# Patient Record
Sex: FEMALE | Race: WHITE | NOT HISPANIC OR LATINO | URBAN - METROPOLITAN AREA
[De-identification: names, ages, dates, MRNs, and addresses within clinical notes are randomized per-mention and may not be internally consistent; named-entity substitution may affect disease eponyms.]

---

## 2017-05-22 RX ORDER — CHLORTHALIDONE 25 MG/1
TABLET ORAL
Qty: 30 TAB | Refills: 3 | Status: SHIPPED | OUTPATIENT
Start: 2017-05-22 | End: 2018-03-26

## 2017-05-22 NOTE — TELEPHONE ENCOUNTER
Was the patient seen in the last year in this department? Yes     Does patient have an active prescription for medications requested? No     Received Request Via: Pharmacy     Last visit:10/20/16

## 2017-06-19 RX ORDER — LOSARTAN POTASSIUM 25 MG/1
TABLET ORAL
Qty: 30 TAB | Refills: 3 | Status: SHIPPED | OUTPATIENT
Start: 2017-06-19 | End: 2018-03-26

## 2017-09-25 ENCOUNTER — APPOINTMENT (RX ONLY)
Dept: URBAN - METROPOLITAN AREA CLINIC 20 | Facility: CLINIC | Age: 52
Setting detail: DERMATOLOGY
End: 2017-09-25

## 2017-09-25 DIAGNOSIS — L57.8 OTHER SKIN CHANGES DUE TO CHRONIC EXPOSURE TO NONIONIZING RADIATION: ICD-10-CM

## 2017-09-25 DIAGNOSIS — L72.0 EPIDERMAL CYST: ICD-10-CM

## 2017-09-25 PROCEDURE — ? SUNSCREEN RECOMMENDATIONS

## 2017-09-25 PROCEDURE — ? COUNSELING

## 2017-09-25 PROCEDURE — ? INTRALESIONAL KENALOG

## 2017-09-25 PROCEDURE — ? MEDICATION COUNSELING

## 2017-09-25 PROCEDURE — ? PRESCRIPTION

## 2017-09-25 PROCEDURE — 99213 OFFICE O/P EST LOW 20 MIN: CPT | Mod: 25

## 2017-09-25 PROCEDURE — 11900 INJECT SKIN LESIONS </W 7: CPT

## 2017-09-25 RX ORDER — DOXYCYCLINE HYCLATE 100 MG/1
CAPSULE, GELATIN COATED ORAL
Qty: 20 | Refills: 0 | Status: ERX | COMMUNITY
Start: 2017-09-25

## 2017-09-25 RX ADMIN — DOXYCYCLINE HYCLATE: 100 CAPSULE, GELATIN COATED ORAL at 00:00

## 2017-09-25 ASSESSMENT — LOCATION SIMPLE DESCRIPTION DERM
LOCATION SIMPLE: LEFT CHEEK
LOCATION SIMPLE: INFERIOR FOREHEAD

## 2017-09-25 ASSESSMENT — LOCATION DETAILED DESCRIPTION DERM
LOCATION DETAILED: LEFT INFERIOR CENTRAL MALAR CHEEK
LOCATION DETAILED: INFERIOR MID FOREHEAD

## 2017-09-25 ASSESSMENT — LOCATION ZONE DERM: LOCATION ZONE: FACE

## 2017-09-25 NOTE — PROCEDURE: INTRALESIONAL KENALOG
Detail Level: Detailed
Size Of Lesion (Optional): 3
X Size Of Lesion In Cm (Optional): 0
Kenalog Preparation: Kenalog
Concentration Of Kenalog Solution Injected (Mg/Ml): 2.5
Total Volume (Ccs): .5
Lot # For Kenalog (Optional): [id number]
Expiration Date For Kenalog (Optional): AUG 2018
Include Z78.9 (Other Specified Conditions Influencing Health Status) As An Associated Diagnosis?: Yes
Administered By (Optional): Rosaura VASQUEZ
Medical Necessity Clause: This procedure was medically necessary because the lesions that were treated were:
Consent: The risks of atrophy, bleeding, scarring and infection were reviewed with the patient. Re-treatment may be necessary.

## 2017-09-25 NOTE — PROCEDURE: SUNSCREEN RECOMMENDATIONS
General Sunscreen Counseling: Advised sun protection-SPF 30 or higher, reapplication after 2 hours if in direct sunlight and wide brim hats.
Detail Level: Detailed

## 2017-09-27 ENCOUNTER — APPOINTMENT (RX ONLY)
Dept: URBAN - METROPOLITAN AREA CLINIC 20 | Facility: CLINIC | Age: 52
Setting detail: DERMATOLOGY
End: 2017-09-27

## 2017-09-27 DIAGNOSIS — L72.0 EPIDERMAL CYST: ICD-10-CM

## 2017-09-27 PROCEDURE — 10060 I&D ABSCESS SIMPLE/SINGLE: CPT

## 2017-09-27 PROCEDURE — ? INCISION AND DRAINAGE

## 2017-09-27 PROCEDURE — ? COUNSELING

## 2017-09-27 ASSESSMENT — LOCATION ZONE DERM
LOCATION ZONE: FACE
LOCATION ZONE: FACE

## 2017-09-27 ASSESSMENT — LOCATION DETAILED DESCRIPTION DERM
LOCATION DETAILED: LEFT INFERIOR CENTRAL MALAR CHEEK
LOCATION DETAILED: LEFT INFERIOR CENTRAL MALAR CHEEK

## 2017-09-27 ASSESSMENT — LOCATION SIMPLE DESCRIPTION DERM
LOCATION SIMPLE: LEFT CHEEK
LOCATION SIMPLE: LEFT CHEEK

## 2017-09-27 NOTE — PROCEDURE: INCISION AND DRAINAGE
Detail Level: Detailed
Consent was obtained and risks were reviewed including but not limited to delayed wound healing, infection, need for multiple I and D's, and pain.
Lesion Type: Abscess
Wound Care: Bacitracin
Curette Text (Optional): After the contents were expressed a curette was used to partially remove the cyst wall.
Drainage Type?: purulent  and cyst-like
Post-Care Instructions: I reviewed with the patient in detail post-care instructions. Patient should keep wound covered and call the office should any redness, pain, swelling or worsening occur.
Epidermal Closure: simple interrupted
Suture Text: The incision was partially closed with
Curette: No
Preparation Text: The area was prepped in the usual clean fashion.
Drainage Amount?: moderate
Method: 11 blade
Dressing: dry sterile dressing
Size Of Lesion In Cm (Optional But May Be Required For Some Insurances): 0
Epidermal Sutures: 4-0 Ethilon

## 2017-10-25 ENCOUNTER — APPOINTMENT (RX ONLY)
Dept: URBAN - METROPOLITAN AREA CLINIC 4 | Facility: CLINIC | Age: 52
Setting detail: DERMATOLOGY
End: 2017-10-25

## 2017-10-25 DIAGNOSIS — L72.8 OTHER FOLLICULAR CYSTS OF THE SKIN AND SUBCUTANEOUS TISSUE: ICD-10-CM

## 2017-10-25 PROBLEM — D48.5 NEOPLASM OF UNCERTAIN BEHAVIOR OF SKIN: Status: ACTIVE | Noted: 2017-10-25

## 2017-10-25 PROCEDURE — ? EXCISION

## 2017-10-25 PROCEDURE — 13132 CMPLX RPR F/C/C/M/N/AX/G/H/F: CPT

## 2017-10-25 PROCEDURE — 11444 EXC FACE-MM B9+MARG 3.1-4 CM: CPT

## 2017-10-25 ASSESSMENT — LOCATION ZONE DERM: LOCATION ZONE: FACE

## 2017-10-25 ASSESSMENT — LOCATION DETAILED DESCRIPTION DERM: LOCATION DETAILED: LEFT INFERIOR CENTRAL MALAR CHEEK

## 2017-10-25 ASSESSMENT — LOCATION SIMPLE DESCRIPTION DERM: LOCATION SIMPLE: LEFT CHEEK

## 2017-10-25 NOTE — PROCEDURE: EXCISION
Intermediate Repair Preamble Text (Leave Blank If You Do Not Want): Undermining was performed with blunt dissection.
Ear Star Wedge Flap Text: The defect edges were debeveled with a #15 blade scalpel.  Given the location of the defect and the proximity to free margins (helical rim) an ear star wedge flap was deemed most appropriate.  Using a sterile surgical marker, the appropriate flap was drawn incorporating the defect and placing the expected incisions between the helical rim and antihelix where possible.  The area thus outlined was incised through and through with a #15 scalpel blade.
Show Repair Anesthesia Variables: Yes
Estimated Blood Loss (Cc): minimal
Trilobed Flap Text: The defect edges were debeveled with a #15 scalpel blade.  Given the location of the defect and the proximity to free margins a trilobed flap was deemed most appropriate.  Using a sterile surgical marker, an appropriate trilobed flap drawn around the defect.    The area thus outlined was incised deep to adipose tissue with a #15 scalpel blade.  The skin margins were undermined to an appropriate distance in all directions utilizing iris scissors.
Medical Necessity Information: It is in your best interest to select a reason for this procedure from the list below. All of these items fulfill various CMS LCD requirements except lesion extends to a margin.
Bilobed Flap Text: The defect edges were debeveled with a #15 scalpel blade.  Given the location of the defect and the proximity to free margins a bilobe flap was deemed most appropriate.  Using a sterile surgical marker, an appropriate bilobe flap drawn around the defect.    The area thus outlined was incised deep to adipose tissue with a #15 scalpel blade.  The skin margins were undermined to an appropriate distance in all directions utilizing iris scissors.
Complex Repair And Dermal Autograft Text: The defect edges were debeveled with a #15 scalpel blade.  The primary defect was closed partially with a complex linear closure.  Given the location of the defect, shape of the defect and the proximity to free margins an dermal autograft was deemed most appropriate to repair the remaining defect.  The graft was trimmed to fit the size of the remaining defect.  The graft was then placed in the primary defect, oriented appropriately, and sutured into place.
Complex Repair And Bilobe Flap Text: The defect edges were debeveled with a #15 scalpel blade.  The primary defect was closed partially with a complex linear closure.  Given the location of the remaining defect, shape of the defect and the proximity to free margins a bilobe flap was deemed most appropriate for complete closure of the defect.  Using a sterile surgical marker, an appropriate advancement flap was drawn incorporating the defect and placing the expected incisions within the relaxed skin tension lines where possible.    The area thus outlined was incised deep to adipose tissue with a #15 scalpel blade.  The skin margins were undermined to an appropriate distance in all directions utilizing iris scissors.
Deep Sutures: 4-0 Vicryl
Complex Repair And Double M Plasty Text: The defect edges were debeveled with a #15 scalpel blade.  The primary defect was closed partially with a complex linear closure.  Given the location of the remaining defect, shape of the defect and the proximity to free margins a double M plasty was deemed most appropriate for complete closure of the defect.  Using a sterile surgical marker, an appropriate advancement flap was drawn incorporating the defect and placing the expected incisions within the relaxed skin tension lines where possible.    The area thus outlined was incised deep to adipose tissue with a #15 scalpel blade.  The skin margins were undermined to an appropriate distance in all directions utilizing iris scissors.
Lip Wedge Excision Repair Text: Given the location of the defect and the proximity to free margins a full thickness wedge repair was deemed most appropriate.  Using a sterile surgical marker, the appropriate repair was drawn incorporating the defect and placing the expected incisions perpendicular to the vermilion border.  The vermilion border was also meticulously outlined to ensure appropriate reapproximation during the repair.  The area thus outlined was incised through and through with a #15 scalpel blade.  The muscularis and dermis were reaproximated with deep sutures following hemostasis. Care was taken to realign the vermilion border before proceeding with the superficial closure.  Once the vermilion was realigned the superfical and mucosal closure was finished.
Bill For Surgical Tray: no
Post-Care Instructions: I reviewed with the patient in detail post-care instructions:\\n1. Apply bacitracin over the steri-strips.  \\n2. Cut non-stick pad (Telfa) to cover the steri-strips\\n3. Apply tape (hypafix) over the non-stick pad\\n4. Change once per day for 5 days\\n5. Shower with bandage on, change bandage after shower\\n\\nPatient is not to engage in any heavy lifting, exercise, hot tub, or swimming for the next 14 days. Should the patient develop any fevers, chills, bleeding, severe pain patient will contact the office immediately.
Surgeon Performing Repair (Optional): Mariam
Epidermal Closure: running subcuticular
Lab: [id number]
Purse String (Simple) Text: Given the location of the defect and the characteristics of the surrounding skin a purse string simple closure was deemed most appropriate.  Undermining was performed circumferentially around the surgical defect.  A purse string suture was then placed and tightened.
Primary Defect Width (In Cm): 0
Island Pedicle Flap With Canthal Suspension Text: The defect edges were debeveled with a #15 scalpel blade.  Given the location of the defect, shape of the defect and the proximity to free margins an island pedicle advancement flap was deemed most appropriate.  Using a sterile surgical marker, an appropriate advancement flap was drawn incorporating the defect, outlining the appropriate donor tissue and placing the expected incisions within the relaxed skin tension lines where possible. The area thus outlined was incised deep to adipose tissue with a #15 scalpel blade.  The skin margins were undermined to an appropriate distance in all directions around the primary defect and laterally outward around the island pedicle utilizing iris scissors.  There was minimal undermining beneath the pedicle flap. A suspension suture was placed in the canthal tendon to prevent tension and prevent ectropion.
Transposition Flap Text: The defect edges were debeveled with a #15 scalpel blade.  Given the location of the defect and the proximity to free margins a transposition flap was deemed most appropriate.  Using a sterile surgical marker, an appropriate transposition flap was drawn incorporating the defect.    The area thus outlined was incised deep to adipose tissue with a #15 scalpel blade.  The skin margins were undermined to an appropriate distance in all directions utilizing iris scissors.
Fusiform Excision Additional Text (Leave Blank If You Do Not Want): The margin was drawn around the clinically apparent lesion.  A fusiform shape was then drawn on the skin incorporating the lesion and margins.  Incisions were then made along these lines to the appropriate tissue plane and the lesion was extirpated.
Complex Repair And Tissue Cultured Epidermal Autograft Text: The defect edges were debeveled with a #15 scalpel blade.  The primary defect was closed partially with a complex linear closure.  Given the location of the defect, shape of the defect and the proximity to free margins an tissue cultured epidermal autograft was deemed most appropriate to repair the remaining defect.  The graft was trimmed to fit the size of the remaining defect.  The graft was then placed in the primary defect, oriented appropriately, and sutured into place.
Complex Repair And Epidermal Autograft Text: The defect edges were debeveled with a #15 scalpel blade.  The primary defect was closed partially with a complex linear closure.  Given the location of the defect, shape of the defect and the proximity to free margins an epidermal autograft was deemed most appropriate to repair the remaining defect.  The graft was trimmed to fit the size of the remaining defect.  The graft was then placed in the primary defect, oriented appropriately, and sutured into place.
Perilesional Excision Additional Text (Leave Blank If You Do Not Want): The margin was drawn around the clinically apparent lesion. Incisions were then made along these lines to the appropriate tissue plane and the lesion was extirpated.
Complex Repair And Single Advancement Flap Text: The defect edges were debeveled with a #15 scalpel blade.  The primary defect was closed partially with a complex linear closure.  Given the location of the remaining defect, shape of the defect and the proximity to free margins a single advancement flap was deemed most appropriate for complete closure of the defect.  Using a sterile surgical marker, an appropriate advancement flap was drawn incorporating the defect and placing the expected incisions within the relaxed skin tension lines where possible.    The area thus outlined was incised deep to adipose tissue with a #15 scalpel blade.  The skin margins were undermined to an appropriate distance in all directions utilizing iris scissors.
Detail Level: Detailed
Bilobed Transposition Flap Text: The defect edges were debeveled with a #15 scalpel blade.  Given the location of the defect and the proximity to free margins a bilobed transposition flap was deemed most appropriate.  Using a sterile surgical marker, an appropriate bilobe flap drawn around the defect.    The area thus outlined was incised deep to adipose tissue with a #15 scalpel blade.  The skin margins were undermined to an appropriate distance in all directions utilizing iris scissors.
Additional Anesthesia Volume In Cc: 6
Complex Repair And Transposition Flap Text: The defect edges were debeveled with a #15 scalpel blade.  The primary defect was closed partially with a complex linear closure.  Given the location of the remaining defect, shape of the defect and the proximity to free margins a transposition flap was deemed most appropriate for complete closure of the defect.  Using a sterile surgical marker, an appropriate advancement flap was drawn incorporating the defect and placing the expected incisions within the relaxed skin tension lines where possible.    The area thus outlined was incised deep to adipose tissue with a #15 scalpel blade.  The skin margins were undermined to an appropriate distance in all directions utilizing iris scissors.
Mucosal Advancement Flap Text: Given the location of the defect, shape of the defect and the proximity to free margins a mucosal advancement flap was deemed most appropriate. Incisions were made with a 15 blade scalpel in the appropriate fashion along the cutaneous vermillion border and the mucosal lip. The remaining actinically damaged mucosal tissue was excised.  The mucosal advancement flap was then elevated to the gingival sulcus with care taken to preserve the neurovascular structures and advanced into the primary defect. Care was taken to ensure that precise realignment of the vermilion border was achieved.
O-T Advancement Flap Text: The defect edges were debeveled with a #15 scalpel blade.  Given the location of the defect, shape of the defect and the proximity to free margins an O-T advancement flap was deemed most appropriate.  Using a sterile surgical marker, an appropriate advancement flap was drawn incorporating the defect and placing the expected incisions within the relaxed skin tension lines where possible.    The area thus outlined was incised deep to adipose tissue with a #15 scalpel blade.  The skin margins were undermined to an appropriate distance in all directions utilizing iris scissors.
Interpolation Flap Text: A decision was made to reconstruct the defect utilizing an interpolation axial flap and a staged reconstruction.  A telfa template was made of the defect.  This telfa template was then used to outline the interpolation flap.  The donor area for the pedicle flap was then injected with anesthesia.  The flap was excised through the skin and subcutaneous tissue down to the layer of the underlying musculature.  The interpolation flap was carefully excised within this deep plane to maintain its blood supply.  The edges of the donor site were undermined.   The donor site was closed in a primary fashion.  The pedicle was then rotated into position and sutured.  Once the tube was sutured into place, adequate blood supply was confirmed with blanching and refill.  The pedicle was then wrapped with xeroform gauze and dressed appropriately with a telfa and gauze bandage to ensure continued blood supply and protect the attached pedicle.
Epidermal Closure Graft Donor Site (Optional): simple interrupted
Home Suture Removal Text: Patient was provided a home suture removal kit and will remove their sutures at home.  If they have any questions or difficulties they will call the office.
Repair Type: Complex
Anesthesia Type: 1% lidocaine with 1:100,000 epinephrine and 408mcg clindamycin/ml and a 1:10 solution of 8.4% sodium bicarbonate
Cheek Interpolation Flap Text: A decision was made to reconstruct the defect utilizing an interpolation axial flap and a staged reconstruction.  A telfa template was made of the defect.  This telfa template was then used to outline the Cheek Interpolation flap.  The donor area for the pedicle flap was then injected with anesthesia.  The flap was excised through the skin and subcutaneous tissue down to the layer of the underlying musculature.  The interpolation flap was carefully excised within this deep plane to maintain its blood supply.  The edges of the donor site were undermined.   The donor site was closed in a primary fashion.  The pedicle was then rotated into position and sutured.  Once the tube was sutured into place, adequate blood supply was confirmed with blanching and refill.  The pedicle was then wrapped with xeroform gauze and dressed appropriately with a telfa and gauze bandage to ensure continued blood supply and protect the attached pedicle.
Excision Depth: adipose tissue
Complex Repair And Split-Thickness Skin Graft Text: The defect edges were debeveled with a #15 scalpel blade.  The primary defect was closed partially with a complex linear closure.  Given the location of the defect, shape of the defect and the proximity to free margins a split thickness skin graft was deemed most appropriate to repair the remaining defect.  The graft was trimmed to fit the size of the remaining defect.  The graft was then placed in the primary defect, oriented appropriately, and sutured into place.
Size Of Margin In Cm: 0.2
H Plasty Text: Given the location of the defect, shape of the defect and the proximity to free margins a H-plasty was deemed most appropriate for repair.  Using a sterile surgical marker, the appropriate advancement arms of the H-plasty were drawn incorporating the defect and placing the expected incisions within the relaxed skin tension lines where possible. The area thus outlined was incised deep to adipose tissue with a #15 scalpel blade. The skin margins were undermined to an appropriate distance in all directions utilizing iris scissors.  The opposing advancement arms were then advanced into place in opposite direction and anchored with interrupted buried subcutaneous sutures.
Excisional Biopsy Additional Text (Leave Blank If You Do Not Want): The margin was drawn around the clinically apparent lesion. An elliptical shape was then drawn on the skin incorporating the lesion and margins.  Incisions were then made along these lines to the appropriate tissue plane and the lesion was extirpated.
Composite Graft Text: The defect edges were debeveled with a #15 scalpel blade.  Given the location of the defect, shape of the defect, the proximity to free margins and the fact the defect was full thickness a composite graft was deemed most appropriate.  The defect was outline and then transferred to the donor site.  A full thickness graft was then excised from the donor site. The graft was then placed in the primary defect, oriented appropriately and then sutured into place.  The secondary defect was then repaired using a primary closure.
Star Wedge Flap Text: The defect edges were debeveled with a #15 scalpel blade.  Given the location of the defect, shape of the defect and the proximity to free margins a star wedge flap was deemed most appropriate.  Using a sterile surgical marker, an appropriate rotation flap was drawn incorporating the defect and placing the expected incisions within the relaxed skin tension lines where possible. The area thus outlined was incised deep to adipose tissue with a #15 scalpel blade.  The skin margins were undermined to an appropriate distance in all directions utilizing iris scissors.
Medical Necessity Clause: This procedure was medically necessary because the lesion that was treated was:
Scalpel Size: 15 blade
O-L Flap Text: The defect edges were debeveled with a #15 scalpel blade.  Given the location of the defect, shape of the defect and the proximity to free margins an O-L flap was deemed most appropriate.  Using a sterile surgical marker, an appropriate advancement flap was drawn incorporating the defect and placing the expected incisions within the relaxed skin tension lines where possible.    The area thus outlined was incised deep to adipose tissue with a #15 scalpel blade.  The skin margins were undermined to an appropriate distance in all directions utilizing iris scissors.
Complex Repair And O-L Flap Text: The defect edges were debeveled with a #15 scalpel blade.  The primary defect was closed partially with a complex linear closure.  Given the location of the remaining defect, shape of the defect and the proximity to free margins an O-L flap was deemed most appropriate for complete closure of the defect.  Using a sterile surgical marker, an appropriate flap was drawn incorporating the defect and placing the expected incisions within the relaxed skin tension lines where possible.    The area thus outlined was incised deep to adipose tissue with a #15 scalpel blade.  The skin margins were undermined to an appropriate distance in all directions utilizing iris scissors.
Complex Repair Preamble Text (Leave Blank If You Do Not Want): Extensive wide undermining was performed.
Billing Type: Third-Party Bill
O-T Plasty Text: The defect edges were debeveled with a #15 scalpel blade.  Given the location of the defect, shape of the defect and the proximity to free margins an O-T plasty was deemed most appropriate.  Using a sterile surgical marker, an appropriate O-T plasty was drawn incorporating the defect and placing the expected incisions within the relaxed skin tension lines where possible.    The area thus outlined was incised deep to adipose tissue with a #15 scalpel blade.  The skin margins were undermined to an appropriate distance in all directions utilizing iris scissors.
Complex Repair And W Plasty Text: The defect edges were debeveled with a #15 scalpel blade.  The primary defect was closed partially with a complex linear closure.  Given the location of the remaining defect, shape of the defect and the proximity to free margins a W plasty was deemed most appropriate for complete closure of the defect.  Using a sterile surgical marker, an appropriate advancement flap was drawn incorporating the defect and placing the expected incisions within the relaxed skin tension lines where possible.    The area thus outlined was incised deep to adipose tissue with a #15 scalpel blade.  The skin margins were undermined to an appropriate distance in all directions utilizing iris scissors.
Rhombic Flap Text: The defect edges were debeveled with a #15 scalpel blade.  Given the location of the defect and the proximity to free margins a rhombic flap was deemed most appropriate.  Using a sterile surgical marker, an appropriate rhombic flap was drawn incorporating the defect.    The area thus outlined was incised deep to adipose tissue with a #15 scalpel blade.  The skin margins were undermined to an appropriate distance in all directions utilizing iris scissors.
Epidermal Autograft Text: The defect edges were debeveled with a #15 scalpel blade.  Given the location of the defect, shape of the defect and the proximity to free margins an epidermal autograft was deemed most appropriate.  Using a sterile surgical marker, the primary defect shape was transferred to the donor site. The epidermal graft was then harvested.  The skin graft was then placed in the primary defect and oriented appropriately.
Complex Repair And Double Advancement Flap Text: The defect edges were debeveled with a #15 scalpel blade.  The primary defect was closed partially with a complex linear closure.  Given the location of the remaining defect, shape of the defect and the proximity to free margins a double advancement flap was deemed most appropriate for complete closure of the defect.  Using a sterile surgical marker, an appropriate advancement flap was drawn incorporating the defect and placing the expected incisions within the relaxed skin tension lines where possible.    The area thus outlined was incised deep to adipose tissue with a #15 scalpel blade.  The skin margins were undermined to an appropriate distance in all directions utilizing iris scissors.
Slit Excision Additional Text (Leave Blank If You Do Not Want): A linear line was drawn on the skin overlying the lesion. An incision was made slowly until the lesion was visualized.  Once visualized, the lesion was removed with blunt dissection.
Dorsal Nasal Flap Text: The defect edges were debeveled with a #15 scalpel blade.  Given the location of the defect and the proximity to free margins a dorsal nasal flap was deemed most appropriate.  Using a sterile surgical marker, an appropriate dorsal nasal flap was drawn around the defect.    The area thus outlined was incised deep to adipose tissue with a #15 scalpel blade.  The skin margins were undermined to an appropriate distance in all directions utilizing iris scissors.
Complex Repair And Xenograft Text: The defect edges were debeveled with a #15 scalpel blade.  The primary defect was closed partially with a complex linear closure.  Given the location of the defect, shape of the defect and the proximity to free margins a xenograft was deemed most appropriate to repair the remaining defect.  The graft was trimmed to fit the size of the remaining defect.  The graft was then placed in the primary defect, oriented appropriately, and sutured into place.
Cartilage Graft Text: The defect edges were debeveled with a #15 scalpel blade.  Given the location of the defect, shape of the defect, the fact the defect involved a full thickness cartilage defect a cartilage graft was deemed most appropriate.  An appropriate donor site was identified, cleansed, and anesthetized. The cartilage graft was then harvested and transferred to the recipient site, oriented appropriately and then sutured into place.  The secondary defect was then repaired using a primary closure.
Wound Care: Bacitracin
No Repair - Repaired With Adjacent Surgical Defect Text (Leave Blank If You Do Not Want): After the excision the defect was repaired concurrently with another surgical defect which was in close approximation.
Epidermal Sutures: 4-0 Ethilon
O-Z Plasty Text: The defect edges were debeveled with a #15 scalpel blade.  Given the location of the defect, shape of the defect and the proximity to free margins an O-Z plasty (double transposition flap) was deemed most appropriate.  Using a sterile surgical marker, the appropriate transposition flaps were drawn incorporating the defect and placing the expected incisions within the relaxed skin tension lines where possible.    The area thus outlined was incised deep to adipose tissue with a #15 scalpel blade.  The skin margins were undermined to an appropriate distance in all directions utilizing iris scissors.  Hemostasis was achieved with electrocautery.  The flaps were then transposed into place, one clockwise and the other counterclockwise, and anchored with interrupted buried subcutaneous sutures.
Consent was obtained from the patient. The risks and benefits to therapy were discussed in detail. Specifically, the risks of infection, scarring, bleeding, prolonged wound healing, incomplete removal, allergy to anesthesia, nerve injury and recurrence were addressed. Prior to the procedure, the treatment site was clearly identified and confirmed by the patient. All components of Universal Protocol/PAUSE Rule completed.
Keystone Flap Text: The defect edges were debeveled with a #15 scalpel blade.  Given the location of the defect, shape of the defect a keystone flap was deemed most appropriate.  Using a sterile surgical marker, an appropriate keystone flap was drawn incorporating the defect, outlining the appropriate donor tissue and placing the expected incisions within the relaxed skin tension lines where possible. The area thus outlined was incised deep to adipose tissue with a #15 scalpel blade.  The skin margins were undermined to an appropriate distance in all directions around the primary defect and laterally outward around the flap utilizing iris scissors.
Cheek-To-Nose Interpolation Flap Text: A decision was made to reconstruct the defect utilizing an interpolation axial flap and a staged reconstruction.  A telfa template was made of the defect.  This telfa template was then used to outline the Cheek-To-Nose Interpolation flap.  The donor area for the pedicle flap was then injected with anesthesia.  The flap was excised through the skin and subcutaneous tissue down to the layer of the underlying musculature.  The interpolation flap was carefully excised within this deep plane to maintain its blood supply.  The edges of the donor site were undermined.   The donor site was closed in a primary fashion.  The pedicle was then rotated into position and sutured.  Once the tube was sutured into place, adequate blood supply was confirmed with blanching and refill.  The pedicle was then wrapped with xeroform gauze and dressed appropriately with a telfa and gauze bandage to ensure continued blood supply and protect the attached pedicle.
Modified Advancement Flap Text: The defect edges were debeveled with a #15 scalpel blade.  Given the location of the defect, shape of the defect and the proximity to free margins a modified advancement flap was deemed most appropriate.  Using a sterile surgical marker, an appropriate advancement flap was drawn incorporating the defect and placing the expected incisions within the relaxed skin tension lines where possible.    The area thus outlined was incised deep to adipose tissue with a #15 scalpel blade.  The skin margins were undermined to an appropriate distance in all directions utilizing iris scissors.
Xenograft Text: The defect edges were debeveled with a #15 scalpel blade.  Given the location of the defect, shape of the defect and the proximity to free margins a xenograft was deemed most appropriate.  The graft was then trimmed to fit the size of the defect.  The graft was then placed in the primary defect and oriented appropriately.
V-Y Flap Text: The defect edges were debeveled with a #15 scalpel blade.  Given the location of the defect, shape of the defect and the proximity to free margins a V-Y flap was deemed most appropriate.  Using a sterile surgical marker, an appropriate advancement flap was drawn incorporating the defect and placing the expected incisions within the relaxed skin tension lines where possible.    The area thus outlined was incised deep to adipose tissue with a #15 scalpel blade.  The skin margins were undermined to an appropriate distance in all directions utilizing iris scissors.
Posterior Auricular Interpolation Flap Text: A decision was made to reconstruct the defect utilizing an interpolation axial flap and a staged reconstruction.  A telfa template was made of the defect.  This telfa template was then used to outline the posterior auricular interpolation flap.  The donor area for the pedicle flap was then injected with anesthesia.  The flap was excised through the skin and subcutaneous tissue down to the layer of the underlying musculature.  The pedicle flap was carefully excised within this deep plane to maintain its blood supply.  The edges of the donor site were undermined.   The donor site was closed in a primary fashion.  The pedicle was then rotated into position and sutured.  Once the tube was sutured into place, adequate blood supply was confirmed with blanching and refill.  The pedicle was then wrapped with xeroform gauze and dressed appropriately with a telfa and gauze bandage to ensure continued blood supply and protect the attached pedicle.
V-Y Plasty Text: The defect edges were debeveled with a #15 scalpel blade.  Given the location of the defect, shape of the defect and the proximity to free margins an V-Y advancement flap was deemed most appropriate.  Using a sterile surgical marker, an appropriate advancement flap was drawn incorporating the defect and placing the expected incisions within the relaxed skin tension lines where possible.    The area thus outlined was incised deep to adipose tissue with a #15 scalpel blade.  The skin margins were undermined to an appropriate distance in all directions utilizing iris scissors.
Complex Repair And Ftsg Text: The defect edges were debeveled with a #15 scalpel blade.  The primary defect was closed partially with a complex linear closure.  Given the location of the defect, shape of the defect and the proximity to free margins a full thickness skin graft was deemed most appropriate to repair the remaining defect.  The graft was trimmed to fit the size of the remaining defect.  The graft was then placed in the primary defect, oriented appropriately, and sutured into place.
Split-Thickness Skin Graft Text: The defect edges were debeveled with a #15 scalpel blade.  Given the location of the defect, shape of the defect and the proximity to free margins a split thickness skin graft was deemed most appropriate.  Using a sterile surgical marker, the primary defect shape was transferred to the donor site. The split thickness graft was then harvested.  The skin graft was then placed in the primary defect and oriented appropriately.
Complex Repair And Z Plasty Text: The defect edges were debeveled with a #15 scalpel blade.  The primary defect was closed partially with a complex linear closure.  Given the location of the remaining defect, shape of the defect and the proximity to free margins a Z plasty was deemed most appropriate for complete closure of the defect.  Using a sterile surgical marker, an appropriate advancement flap was drawn incorporating the defect and placing the expected incisions within the relaxed skin tension lines where possible.    The area thus outlined was incised deep to adipose tissue with a #15 scalpel blade.  The skin margins were undermined to an appropriate distance in all directions utilizing iris scissors.
W Plasty Text: The lesion was extirpated to the level of the fat with a #15 scalpel blade.  Given the location of the defect, shape of the defect and the proximity to free margins a W-plasty was deemed most appropriate for repair.  Using a sterile surgical marker, the appropriate transposition arms of the W-plasty were drawn incorporating the defect and placing the expected incisions within the relaxed skin tension lines where possible.    The area thus outlined was incised deep to adipose tissue with a #15 scalpel blade.  The skin margins were undermined to an appropriate distance in all directions utilizing iris scissors.  The opposing transposition arms were then transposed into place in opposite direction and anchored with interrupted buried subcutaneous sutures.
Graft Donor Site Bandage (Optional-Leave Blank If You Don't Want In Note): Steri-strips and a pressure bandage were applied to the donor site.
Island Pedicle Flap-Requiring Vessel Identification Text: The defect edges were debeveled with a #15 scalpel blade.  Given the location of the defect, shape of the defect and the proximity to free margins an island pedicle advancement flap was deemed most appropriate.  Using a sterile surgical marker, an appropriate advancement flap was drawn, based on the axial vessel mentioned above, incorporating the defect, outlining the appropriate donor tissue and placing the expected incisions within the relaxed skin tension lines where possible.    The area thus outlined was incised deep to adipose tissue with a #15 scalpel blade.  The skin margins were undermined to an appropriate distance in all directions around the primary defect and laterally outward around the island pedicle utilizing iris scissors.  There was minimal undermining beneath the pedicle flap.
Complex Repair And Melolabial Flap Text: The defect edges were debeveled with a #15 scalpel blade.  The primary defect was closed partially with a complex linear closure.  Given the location of the remaining defect, shape of the defect and the proximity to free margins a melolabial flap was deemed most appropriate for complete closure of the defect.  Using a sterile surgical marker, an appropriate advancement flap was drawn incorporating the defect and placing the expected incisions within the relaxed skin tension lines where possible.    The area thus outlined was incised deep to adipose tissue with a #15 scalpel blade.  The skin margins were undermined to an appropriate distance in all directions utilizing iris scissors.
Crescentic Advancement Flap Text: The defect edges were debeveled with a #15 scalpel blade.  Given the location of the defect and the proximity to free margins a crescentic advancement flap was deemed most appropriate.  Using a sterile surgical marker, the appropriate advancement flap was drawn incorporating the defect and placing the expected incisions within the relaxed skin tension lines where possible.    The area thus outlined was incised deep to adipose tissue with a #15 scalpel blade.  The skin margins were undermined to an appropriate distance in all directions utilizing iris scissors.
Complex Repair And A-T Advancement Flap Text: The defect edges were debeveled with a #15 scalpel blade.  The primary defect was closed partially with a complex linear closure.  Given the location of the remaining defect, shape of the defect and the proximity to free margins an A-T advancement flap was deemed most appropriate for complete closure of the defect.  Using a sterile surgical marker, an appropriate advancement flap was drawn incorporating the defect and placing the expected incisions within the relaxed skin tension lines where possible.    The area thus outlined was incised deep to adipose tissue with a #15 scalpel blade.  The skin margins were undermined to an appropriate distance in all directions utilizing iris scissors.
Complex Repair And O-T Advancement Flap Text: The defect edges were debeveled with a #15 scalpel blade.  The primary defect was closed partially with a complex linear closure.  Given the location of the remaining defect, shape of the defect and the proximity to free margins an O-T advancement flap was deemed most appropriate for complete closure of the defect.  Using a sterile surgical marker, an appropriate advancement flap was drawn incorporating the defect and placing the expected incisions within the relaxed skin tension lines where possible.    The area thus outlined was incised deep to adipose tissue with a #15 scalpel blade.  The skin margins were undermined to an appropriate distance in all directions utilizing iris scissors.
Dermal Autograft Text: The defect edges were debeveled with a #15 scalpel blade.  Given the location of the defect, shape of the defect and the proximity to free margins a dermal autograft was deemed most appropriate.  Using a sterile surgical marker, the primary defect shape was transferred to the donor site. The area thus outlined was incised deep to adipose tissue with a #15 scalpel blade.  The harvested graft was then trimmed of adipose and epidermal tissue until only dermis was left.  The skin graft was then placed in the primary defect and oriented appropriately.
Complex Repair And V-Y Plasty Text: The defect edges were debeveled with a #15 scalpel blade.  The primary defect was closed partially with a complex linear closure.  Given the location of the remaining defect, shape of the defect and the proximity to free margins a V-Y plasty was deemed most appropriate for complete closure of the defect.  Using a sterile surgical marker, an appropriate advancement flap was drawn incorporating the defect and placing the expected incisions within the relaxed skin tension lines where possible.    The area thus outlined was incised deep to adipose tissue with a #15 scalpel blade.  The skin margins were undermined to an appropriate distance in all directions utilizing iris scissors.
Island Pedicle Flap Text: The defect edges were debeveled with a #15 scalpel blade.  Given the location of the defect, shape of the defect and the proximity to free margins an island pedicle advancement flap was deemed most appropriate.  Using a sterile surgical marker, an appropriate advancement flap was drawn incorporating the defect, outlining the appropriate donor tissue and placing the expected incisions within the relaxed skin tension lines where possible.    The area thus outlined was incised deep to adipose tissue with a #15 scalpel blade.  The skin margins were undermined to an appropriate distance in all directions around the primary defect and laterally outward around the island pedicle utilizing iris scissors.  There was minimal undermining beneath the pedicle flap.
Bi-Rhombic Flap Text: The defect edges were debeveled with a #15 scalpel blade.  Given the location of the defect and the proximity to free margins a bi-rhombic flap was deemed most appropriate.  Using a sterile surgical marker, an appropriate rhombic flap was drawn incorporating the defect. The area thus outlined was incised deep to adipose tissue with a #15 scalpel blade.  The skin margins were undermined to an appropriate distance in all directions utilizing iris scissors.
Spiral Flap Text: The defect edges were debeveled with a #15 scalpel blade.  Given the location of the defect, shape of the defect and the proximity to free margins a spiral flap was deemed most appropriate.  Using a sterile surgical marker, an appropriate rotation flap was drawn incorporating the defect and placing the expected incisions within the relaxed skin tension lines where possible. The area thus outlined was incised deep to adipose tissue with a #15 scalpel blade.  The skin margins were undermined to an appropriate distance in all directions utilizing iris scissors.
Paramedian Forehead Flap Text: A decision was made to reconstruct the defect utilizing an interpolation axial flap and a staged reconstruction.  A telfa template was made of the defect.  This telfa template was then used to outline the paramedian forehead pedicle flap.  The donor area for the pedicle flap was then injected with anesthesia.  The flap was excised through the skin and subcutaneous tissue down to the layer of the underlying musculature.  The pedicle flap was carefully excised within this deep plane to maintain its blood supply.  The edges of the donor site were undermined.   The donor site was closed in a primary fashion.  The pedicle was then rotated into position and sutured.  Once the tube was sutured into place, adequate blood supply was confirmed with blanching and refill.  The pedicle was then wrapped with xeroform gauze and dressed appropriately with a telfa and gauze bandage to ensure continued blood supply and protect the attached pedicle.
Complex Repair And Skin Substitute Graft Text: The defect edges were debeveled with a #15 scalpel blade.  The primary defect was closed partially with a complex linear closure.  Given the location of the remaining defect, shape of the defect and the proximity to free margins a skin substitute graft was deemed most appropriate to repair the remaining defect.  The graft was trimmed to fit the size of the remaining defect.  The graft was then placed in the primary defect, oriented appropriately, and sutured into place.
Repair Performed By Another Provider Text (Leave Blank If You Do Not Want): After the tissue was excised the defect was repaired by another provider.
Mastoid Interpolation Flap Text: A decision was made to reconstruct the defect utilizing an interpolation axial flap and a staged reconstruction.  A telfa template was made of the defect.  This telfa template was then used to outline the mastoid interpolation flap.  The donor area for the pedicle flap was then injected with anesthesia.  The flap was excised through the skin and subcutaneous tissue down to the layer of the underlying musculature.  The pedicle flap was carefully excised within this deep plane to maintain its blood supply.  The edges of the donor site were undermined.   The donor site was closed in a primary fashion.  The pedicle was then rotated into position and sutured.  Once the tube was sutured into place, adequate blood supply was confirmed with blanching and refill.  The pedicle was then wrapped with xeroform gauze and dressed appropriately with a telfa and gauze bandage to ensure continued blood supply and protect the attached pedicle.
Path Notes (To The Dermatopathologist): Please check margins.
Complex Repair And Rotation Flap Text: The defect edges were debeveled with a #15 scalpel blade.  The primary defect was closed partially with a complex linear closure.  Given the location of the remaining defect, shape of the defect and the proximity to free margins a rotation flap was deemed most appropriate for complete closure of the defect.  Using a sterile surgical marker, an appropriate advancement flap was drawn incorporating the defect and placing the expected incisions within the relaxed skin tension lines where possible.    The area thus outlined was incised deep to adipose tissue with a #15 scalpel blade.  The skin margins were undermined to an appropriate distance in all directions utilizing iris scissors.
Complex Repair And M Plasty Text: The defect edges were debeveled with a #15 scalpel blade.  The primary defect was closed partially with a complex linear closure.  Given the location of the remaining defect, shape of the defect and the proximity to free margins an M plasty was deemed most appropriate for complete closure of the defect.  Using a sterile surgical marker, an appropriate advancement flap was drawn incorporating the defect and placing the expected incisions within the relaxed skin tension lines where possible.    The area thus outlined was incised deep to adipose tissue with a #15 scalpel blade.  The skin margins were undermined to an appropriate distance in all directions utilizing iris scissors.
Advancement Flap (Single) Text: The defect edges were debeveled with a #15 scalpel blade.  Given the location of the defect and the proximity to free margins a single advancement flap was deemed most appropriate.  Using a sterile surgical marker, an appropriate advancement flap was drawn incorporating the defect and placing the expected incisions within the relaxed skin tension lines where possible.    The area thus outlined was incised deep to adipose tissue with a #15 scalpel blade.  The skin margins were undermined to an appropriate distance in all directions utilizing iris scissors.
Repair Anesthesia Method: local infiltration
Eliptical Excision Additional Text (Leave Blank If You Do Not Want): The margin was drawn around the clinically apparent lesion.  An elliptical shape was then drawn on the skin incorporating the lesion and margins.  Incisions were then made along these lines to the appropriate tissue plane and the lesion was extirpated.
Rotation Flap Text: The defect edges were debeveled with a #15 scalpel blade.  Given the location of the defect, shape of the defect and the proximity to free margins a rotation flap was deemed most appropriate.  Using a sterile surgical marker, an appropriate rotation flap was drawn incorporating the defect and placing the expected incisions within the relaxed skin tension lines where possible.    The area thus outlined was incised deep to adipose tissue with a #15 scalpel blade.  The skin margins were undermined to an appropriate distance in all directions utilizing iris scissors.
Intermediate / Complex Repair - Final Wound Length In Cm: 4.1
Lab Facility: [id number]
Dressing: steri-strips
Excision Method: Elliptical
Ftsg Text: The defect edges were debeveled with a #15 scalpel blade.  Given the location of the defect, shape of the defect and the proximity to free margins a full thickness skin graft was deemed most appropriate.  Using a sterile surgical marker, the primary defect shape was transferred to the donor site. The area thus outlined was incised deep to adipose tissue with a #15 scalpel blade.  The harvested graft was then trimmed of adipose tissue until only dermis and epidermis was left.  The skin margins of the secondary defect were undermined to an appropriate distance in all directions utilizing iris scissors.  The secondary defect was closed with interrupted buried subcutaneous sutures.  The skin edges were then re-apposed with running  sutures.  The skin graft was then placed in the primary defect and oriented appropriately.
Anesthesia Volume In Cc: 6.8
A-T Advancement Flap Text: The defect edges were debeveled with a #15 scalpel blade.  Given the location of the defect, shape of the defect and the proximity to free margins an A-T advancement flap was deemed most appropriate.  Using a sterile surgical marker, an appropriate advancement flap was drawn incorporating the defect and placing the expected incisions within the relaxed skin tension lines where possible.    The area thus outlined was incised deep to adipose tissue with a #15 scalpel blade.  The skin margins were undermined to an appropriate distance in all directions utilizing iris scissors.
Z Plasty Text: The lesion was extirpated to the level of the fat with a #15 scalpel blade.  Given the location of the defect, shape of the defect and the proximity to free margins a Z-plasty was deemed most appropriate for repair.  Using a sterile surgical marker, the appropriate transposition arms of the Z-plasty were drawn incorporating the defect and placing the expected incisions within the relaxed skin tension lines where possible.    The area thus outlined was incised deep to adipose tissue with a #15 scalpel blade.  The skin margins were undermined to an appropriate distance in all directions utilizing iris scissors.  The opposing transposition arms were then transposed into place in opposite direction and anchored with interrupted buried subcutaneous sutures.
Complex Repair And Rhombic Flap Text: The defect edges were debeveled with a #15 scalpel blade.  The primary defect was closed partially with a complex linear closure.  Given the location of the remaining defect, shape of the defect and the proximity to free margins a rhombic flap was deemed most appropriate for complete closure of the defect.  Using a sterile surgical marker, an appropriate advancement flap was drawn incorporating the defect and placing the expected incisions within the relaxed skin tension lines where possible.    The area thus outlined was incised deep to adipose tissue with a #15 scalpel blade.  The skin margins were undermined to an appropriate distance in all directions utilizing iris scissors.
Complex Repair And Dorsal Nasal Flap Text: The defect edges were debeveled with a #15 scalpel blade.  The primary defect was closed partially with a complex linear closure.  Given the location of the remaining defect, shape of the defect and the proximity to free margins a dorsal nasal flap was deemed most appropriate for complete closure of the defect.  Using a sterile surgical marker, an appropriate flap was drawn incorporating the defect and placing the expected incisions within the relaxed skin tension lines where possible.    The area thus outlined was incised deep to adipose tissue with a #15 scalpel blade.  The skin margins were undermined to an appropriate distance in all directions utilizing iris scissors.
Hemostasis: Electrocautery
Burow's Advancement Flap Text: The defect edges were debeveled with a #15 scalpel blade.  Given the location of the defect and the proximity to free margins a Burow's advancement flap was deemed most appropriate.  Using a sterile surgical marker, the appropriate advancement flap was drawn incorporating the defect and placing the expected incisions within the relaxed skin tension lines where possible.    The area thus outlined was incised deep to adipose tissue with a #15 scalpel blade.  The skin margins were undermined to an appropriate distance in all directions utilizing iris scissors.
Bilateral Helical Rim Advancement Flap Text: The defect edges were debeveled with a #15 blade scalpel.  Given the location of the defect and the proximity to free margins (helical rim) a bilateral helical rim advancement flap was deemed most appropriate.  Using a sterile surgical marker, the appropriate advancement flaps were drawn incorporating the defect and placing the expected incisions between the helical rim and antihelix where possible.  The area thus outlined was incised through and through with a #15 scalpel blade.  With a skin hook and iris scissors, the flaps were gently and sharply undermined and freed up.
Helical Rim Advancement Flap Text: The defect edges were debeveled with a #15 blade scalpel.  Given the location of the defect and the proximity to free margins (helical rim) a double helical rim advancement flap was deemed most appropriate.  Using a sterile surgical marker, the appropriate advancement flaps were drawn incorporating the defect and placing the expected incisions between the helical rim and antihelix where possible.  The area thus outlined was incised through and through with a #15 scalpel blade.  With a skin hook and iris scissors, the flaps were gently and sharply undermined and freed up.
Hatchet Flap Text: The defect edges were debeveled with a #15 scalpel blade.  Given the location of the defect, shape of the defect and the proximity to free margins a hatchet flap was deemed most appropriate.  Using a sterile surgical marker, an appropriate hatchet flap was drawn incorporating the defect and placing the expected incisions within the relaxed skin tension lines where possible.    The area thus outlined was incised deep to adipose tissue with a #15 scalpel blade.  The skin margins were undermined to an appropriate distance in all directions utilizing iris scissors.
Tissue Cultured Epidermal Autograft Text: The defect edges were debeveled with a #15 scalpel blade.  Given the location of the defect, shape of the defect and the proximity to free margins a tissue cultured epidermal autograft was deemed most appropriate.  The graft was then trimmed to fit the size of the defect.  The graft was then placed in the primary defect and oriented appropriately.
Saucerization Excision Additional Text (Leave Blank If You Do Not Want): The margin was drawn around the clinically apparent lesion.  Incisions were then made along these lines, in a tangential fashion, to the appropriate tissue plane and the lesion was extirpated.
Double Island Pedicle Flap Text: The defect edges were debeveled with a #15 scalpel blade.  Given the location of the defect, shape of the defect and the proximity to free margins a double island pedicle advancement flap was deemed most appropriate.  Using a sterile surgical marker, an appropriate advancement flap was drawn incorporating the defect, outlining the appropriate donor tissue and placing the expected incisions within the relaxed skin tension lines where possible.    The area thus outlined was incised deep to adipose tissue with a #15 scalpel blade.  The skin margins were undermined to an appropriate distance in all directions around the primary defect and laterally outward around the island pedicle utilizing iris scissors.  There was minimal undermining beneath the pedicle flap.
Alar Island Pedicle Flap Text: The defect edges were debeveled with a #15 scalpel blade.  Given the location of the defect, shape of the defect and the proximity to the alar rim an island pedicle advancement flap was deemed most appropriate.  Using a sterile surgical marker, an appropriate advancement flap was drawn incorporating the defect, outlining the appropriate donor tissue and placing the expected incisions within the nasal ala running parallel to the alar rim. The area thus outlined was incised with a #15 scalpel blade.  The skin margins were undermined minimally to an appropriate distance in all directions around the primary defect and laterally outward around the island pedicle utilizing iris scissors.  There was minimal undermining beneath the pedicle flap.
Muscle Hinge Flap Text: The defect edges were debeveled with a #15 scalpel blade.  Given the size, depth and location of the defect and the proximity to free margins a muscle hinge flap was deemed most appropriate.  Using a sterile surgical marker, an appropriate hinge flap was drawn incorporating the defect. The area thus outlined was incised with a #15 scalpel blade.  The skin margins were undermined to an appropriate distance in all directions utilizing iris scissors.
Complex Repair And Modified Advancement Flap Text: The defect edges were debeveled with a #15 scalpel blade.  The primary defect was closed partially with a complex linear closure.  Given the location of the remaining defect, shape of the defect and the proximity to free margins a modified advancement flap was deemed most appropriate for complete closure of the defect.  Using a sterile surgical marker, an appropriate advancement flap was drawn incorporating the defect and placing the expected incisions within the relaxed skin tension lines where possible.    The area thus outlined was incised deep to adipose tissue with a #15 scalpel blade.  The skin margins were undermined to an appropriate distance in all directions utilizing iris scissors.
Melolabial Interpolation Flap Text: A decision was made to reconstruct the defect utilizing an interpolation axial flap and a staged reconstruction.  A telfa template was made of the defect.  This telfa template was then used to outline the melolabial interpolation flap.  The donor area for the pedicle flap was then injected with anesthesia.  The flap was excised through the skin and subcutaneous tissue down to the layer of the underlying musculature.  The pedicle flap was carefully excised within this deep plane to maintain its blood supply.  The edges of the donor site were undermined.   The donor site was closed in a primary fashion.  The pedicle was then rotated into position and sutured.  Once the tube was sutured into place, adequate blood supply was confirmed with blanching and refill.  The pedicle was then wrapped with xeroform gauze and dressed appropriately with a telfa and gauze bandage to ensure continued blood supply and protect the attached pedicle.
Size Of Lesion In Cm: 2.7
S Plasty Text: Given the location and shape of the defect, and the orientation of relaxed skin tension lines, an S-plasty was deemed most appropriate for repair.  Using a sterile surgical marker, the appropriate outline of the S-plasty was drawn, incorporating the defect and placing the expected incisions within the relaxed skin tension lines where possible.  The area thus outlined was incised deep to adipose tissue with a #15 scalpel blade.  The skin margins were undermined to an appropriate distance in all directions utilizing iris scissors. The skin flaps were advanced over the defect.  The opposing margins were then approximated with interrupted buried subcutaneous sutures.
Advancement Flap (Double) Text: The defect edges were debeveled with a #15 scalpel blade.  Given the location of the defect and the proximity to free margins a double advancement flap was deemed most appropriate.  Using a sterile surgical marker, the appropriate advancement flaps were drawn incorporating the defect and placing the expected incisions within the relaxed skin tension lines where possible.    The area thus outlined was incised deep to adipose tissue with a #15 scalpel blade.  The skin margins were undermined to an appropriate distance in all directions utilizing iris scissors.
Referring Physician (Optional): SCOTT Toribio
Skin Substitute Text: The defect edges were debeveled with a #15 scalpel blade.  Given the location of the defect, shape of the defect and the proximity to free margins a skin substitute graft was deemed most appropriate.  The graft material was trimmed to fit the size of the defect. The graft was then placed in the primary defect and oriented appropriately.
Melolabial Transposition Flap Text: The defect edges were debeveled with a #15 scalpel blade.  Given the location of the defect and the proximity to free margins a melolabial flap was deemed most appropriate.  Using a sterile surgical marker, an appropriate melolabial transposition flap was drawn incorporating the defect.    The area thus outlined was incised deep to adipose tissue with a #15 scalpel blade.  The skin margins were undermined to an appropriate distance in all directions utilizing iris scissors.
Purse String (Intermediate) Text: Given the location of the defect and the characteristics of the surrounding skin a purse string intermediate closure was deemed most appropriate.  Undermining was performed circumferentially around the surgical defect.  A purse string suture was then placed and tightened.

## 2017-10-25 NOTE — HPI: PROCEDURE (SKIN SURGERY)
Has The Growth Been Previously Biopsied?: has not been previously biopsied
Body Location Override (Optional): Left cheek

## 2017-11-02 ENCOUNTER — APPOINTMENT (RX ONLY)
Dept: URBAN - METROPOLITAN AREA CLINIC 20 | Facility: CLINIC | Age: 52
Setting detail: DERMATOLOGY
End: 2017-11-02

## 2017-11-02 DIAGNOSIS — L72.0 EPIDERMAL CYST: ICD-10-CM

## 2017-11-02 PROCEDURE — 99212 OFFICE O/P EST SF 10 MIN: CPT | Mod: 24

## 2017-11-02 PROCEDURE — ? COUNSELING

## 2017-11-02 ASSESSMENT — LOCATION SIMPLE DESCRIPTION DERM: LOCATION SIMPLE: LEFT CHEEK

## 2017-11-02 ASSESSMENT — LOCATION ZONE DERM: LOCATION ZONE: FACE

## 2017-11-02 ASSESSMENT — LOCATION DETAILED DESCRIPTION DERM: LOCATION DETAILED: LEFT INFERIOR CENTRAL MALAR CHEEK

## 2017-11-02 NOTE — HPI: WOUND CHECK
How Is Your Wound Healing?: healing well
Additional History: 1 week post opt from sx with Dr. Becerra

## 2017-11-07 ENCOUNTER — APPOINTMENT (RX ONLY)
Dept: URBAN - METROPOLITAN AREA CLINIC 20 | Facility: CLINIC | Age: 52
Setting detail: DERMATOLOGY
End: 2017-11-07

## 2017-11-07 DIAGNOSIS — L72.0 EPIDERMAL CYST: ICD-10-CM

## 2017-11-07 DIAGNOSIS — L90.5 SCAR CONDITIONS AND FIBROSIS OF SKIN: ICD-10-CM

## 2017-11-07 PROCEDURE — ? COUNSELING

## 2017-11-07 ASSESSMENT — LOCATION DETAILED DESCRIPTION DERM
LOCATION DETAILED: LEFT SUPERIOR MEDIAL BUCCAL CHEEK
LOCATION DETAILED: LEFT INFERIOR CENTRAL MALAR CHEEK

## 2017-11-07 ASSESSMENT — LOCATION ZONE DERM
LOCATION ZONE: FACE
LOCATION ZONE: FACE

## 2017-11-07 ASSESSMENT — LOCATION SIMPLE DESCRIPTION DERM
LOCATION SIMPLE: LEFT CHEEK
LOCATION SIMPLE: LEFT CHEEK

## 2018-01-04 ENCOUNTER — APPOINTMENT (RX ONLY)
Dept: URBAN - METROPOLITAN AREA CLINIC 20 | Facility: CLINIC | Age: 53
Setting detail: DERMATOLOGY
End: 2018-01-04

## 2018-01-04 DIAGNOSIS — L81.4 OTHER MELANIN HYPERPIGMENTATION: ICD-10-CM

## 2018-01-04 DIAGNOSIS — L90.5 SCAR CONDITIONS AND FIBROSIS OF SKIN: ICD-10-CM

## 2018-01-04 DIAGNOSIS — L82.0 INFLAMED SEBORRHEIC KERATOSIS: ICD-10-CM

## 2018-01-04 DIAGNOSIS — L82.1 OTHER SEBORRHEIC KERATOSIS: ICD-10-CM

## 2018-01-04 DIAGNOSIS — D18.0 HEMANGIOMA: ICD-10-CM

## 2018-01-04 DIAGNOSIS — D22 MELANOCYTIC NEVI: ICD-10-CM

## 2018-01-04 DIAGNOSIS — Z71.89 OTHER SPECIFIED COUNSELING: ICD-10-CM

## 2018-01-04 PROBLEM — D22.62 MELANOCYTIC NEVI OF LEFT UPPER LIMB, INCLUDING SHOULDER: Status: ACTIVE | Noted: 2018-01-04

## 2018-01-04 PROBLEM — D48.5 NEOPLASM OF UNCERTAIN BEHAVIOR OF SKIN: Status: ACTIVE | Noted: 2018-01-04

## 2018-01-04 PROBLEM — D22.72 MELANOCYTIC NEVI OF LEFT LOWER LIMB, INCLUDING HIP: Status: ACTIVE | Noted: 2018-01-04

## 2018-01-04 PROBLEM — D22.71 MELANOCYTIC NEVI OF RIGHT LOWER LIMB, INCLUDING HIP: Status: ACTIVE | Noted: 2018-01-04

## 2018-01-04 PROBLEM — D22.5 MELANOCYTIC NEVI OF TRUNK: Status: ACTIVE | Noted: 2018-01-04

## 2018-01-04 PROBLEM — D22.61 MELANOCYTIC NEVI OF RIGHT UPPER LIMB, INCLUDING SHOULDER: Status: ACTIVE | Noted: 2018-01-04

## 2018-01-04 PROBLEM — D18.01 HEMANGIOMA OF SKIN AND SUBCUTANEOUS TISSUE: Status: ACTIVE | Noted: 2018-01-04

## 2018-01-04 PROBLEM — D22.39 MELANOCYTIC NEVI OF OTHER PARTS OF FACE: Status: ACTIVE | Noted: 2018-01-04

## 2018-01-04 PROCEDURE — ? COUNSELING

## 2018-01-04 PROCEDURE — 99214 OFFICE O/P EST MOD 30 MIN: CPT | Mod: 25

## 2018-01-04 PROCEDURE — ? BIOPSY BY SHAVE METHOD

## 2018-01-04 PROCEDURE — ? ADDITIONAL NOTES

## 2018-01-04 PROCEDURE — 11100: CPT

## 2018-01-04 PROCEDURE — ? SUNSCREEN RECOMMENDATIONS

## 2018-01-04 ASSESSMENT — LOCATION SIMPLE DESCRIPTION DERM
LOCATION SIMPLE: RIGHT PRETIBIAL REGION
LOCATION SIMPLE: UPPER BACK
LOCATION SIMPLE: RIGHT CHEEK
LOCATION SIMPLE: RIGHT FOREARM
LOCATION SIMPLE: LEFT POSTERIOR UPPER ARM
LOCATION SIMPLE: RIGHT POSTERIOR THIGH
LOCATION SIMPLE: LEFT POSTERIOR THIGH
LOCATION SIMPLE: RIGHT UPPER BACK
LOCATION SIMPLE: LEFT PRETIBIAL REGION
LOCATION SIMPLE: LEFT NOSE
LOCATION SIMPLE: RIGHT FOREHEAD
LOCATION SIMPLE: RIGHT POSTERIOR UPPER ARM
LOCATION SIMPLE: RIGHT LOWER BACK
LOCATION SIMPLE: LEFT FOREARM
LOCATION SIMPLE: LEFT CHEEK

## 2018-01-04 ASSESSMENT — LOCATION ZONE DERM
LOCATION ZONE: TRUNK
LOCATION ZONE: FACE
LOCATION ZONE: ARM
LOCATION ZONE: LEG
LOCATION ZONE: NOSE

## 2018-01-04 ASSESSMENT — LOCATION DETAILED DESCRIPTION DERM
LOCATION DETAILED: LEFT VENTRAL PROXIMAL FOREARM
LOCATION DETAILED: INFERIOR THORACIC SPINE
LOCATION DETAILED: RIGHT VENTRAL PROXIMAL FOREARM
LOCATION DETAILED: RIGHT PROXIMAL PRETIBIAL REGION
LOCATION DETAILED: RIGHT SUPERIOR UPPER BACK
LOCATION DETAILED: RIGHT INFERIOR FOREHEAD
LOCATION DETAILED: RIGHT DISTAL POSTERIOR UPPER ARM
LOCATION DETAILED: LEFT PROXIMAL POSTERIOR UPPER ARM
LOCATION DETAILED: RIGHT INFERIOR CENTRAL MALAR CHEEK
LOCATION DETAILED: RIGHT DISTAL POSTERIOR THIGH
LOCATION DETAILED: RIGHT INFERIOR MEDIAL MIDBACK
LOCATION DETAILED: LEFT NASAL SIDEWALL
LOCATION DETAILED: RIGHT INFERIOR MEDIAL UPPER BACK
LOCATION DETAILED: LEFT LATERAL PROXIMAL PRETIBIAL REGION
LOCATION DETAILED: LEFT DISTAL POSTERIOR THIGH
LOCATION DETAILED: LEFT INFERIOR CENTRAL MALAR CHEEK

## 2018-01-04 NOTE — PROCEDURE: BIOPSY BY SHAVE METHOD
Post-Care Instructions: I reviewed with the patient in detail post-care instructions. Patient is to keep the biopsy site clean once daily and then apply petroleum and bandaid  until healed.
Detail Level: Detailed
Biopsy Method: Personna blade
Consent: Written consent was obtained and risks were reviewed including but not limited to scarring, infection, bleeding, scabbing, incomplete removal, nerve damage and allergy to anesthesia.
Dressing: Band-Aid
Destruction After The Procedure: No
Anesthesia Type: 1% lidocaine with 1:100,000 epinephrine and 408mcg clindamycin/ml and a 1:10 solution of 8.4% sodium bicarbonate
Biopsy Type: H and E
Additional Anesthesia Volume In Cc (Will Not Render If 0): 0
Type Of Destruction Used: Curettage
Lab Facility: 
Anesthesia Volume In Cc: 1
X Size Of Lesion In Cm: 0.2
Hemostasis: Drysol and Electrocautery
Billing Type: Third-Party Bill
Render Post-Care Instructions In Note?: yes
Wound Care: Bacitracin
Notification Instructions: Patient will be notified of biopsy results. However, patient instructed to call the office if not contacted within 2 weeks.
Lab: [id number]

## 2018-01-19 ENCOUNTER — APPOINTMENT (RX ONLY)
Dept: URBAN - METROPOLITAN AREA CLINIC 20 | Facility: CLINIC | Age: 53
Setting detail: DERMATOLOGY
End: 2018-01-19

## 2018-01-19 DIAGNOSIS — L90.5 SCAR CONDITIONS AND FIBROSIS OF SKIN: ICD-10-CM

## 2018-01-19 PROCEDURE — ? BENIGN DESTRUCTION: CO2 LASER

## 2018-01-19 ASSESSMENT — LOCATION ZONE DERM: LOCATION ZONE: FACE

## 2018-01-19 ASSESSMENT — LOCATION DETAILED DESCRIPTION DERM: LOCATION DETAILED: LEFT CENTRAL MALAR CHEEK

## 2018-01-19 ASSESSMENT — LOCATION SIMPLE DESCRIPTION DERM: LOCATION SIMPLE: LEFT CHEEK

## 2018-01-19 NOTE — PROCEDURE: BENIGN DESTRUCTION: CO2 LASER
Laser Type: CO2 laser
Was Feathering Performed?: Yes
Treatment Number: 1
Consent: Written consent obtained, risks reviewed including but not limited to crusting, scabbing, blistering, scarring, darker or lighter pigmentary change, incomplete improvement, infection and bleeding.
Post-Care Statement: Following the procedure, vaseline and a bandage were applied.
Anesthesia Type: 1% lidocaine with epinephrine
Medical Necessity Clause: This procedure was medically necessary because the lesions that were treated were:
Post-Care Instructions: I reviewed with the patient in detail post-care instructions. Patient should avoid sun until the area is fully healed. Pt should apply vaseline to treated areas.
Spot Sizes: 3mm
Pulse Duration (Usec): 0
Passes: 2
Detail Level: Detailed
External Cooling Fan Speed: 5
Include Z78.9 (Other Specified Conditions Influencing Health Status) As An Associated Diagnosis?: No
Laser Mode: Pulsed
Medical Necessity Information: It is in your best interest to select a reason for this procedure from the list below. All of these items fulfill various CMS LCD requirements except the new and changing color options.
Feathering Statement: Courtesy treatment to improve scar appearance. She will begin Biocorneum in 2 weeks.
Power (Hdez-Leave At Zero If Unwanted): 18

## 2018-02-26 ENCOUNTER — APPOINTMENT (RX ONLY)
Dept: URBAN - METROPOLITAN AREA CLINIC 20 | Facility: CLINIC | Age: 53
Setting detail: DERMATOLOGY
End: 2018-02-26

## 2018-02-26 DIAGNOSIS — L90.5 SCAR CONDITIONS AND FIBROSIS OF SKIN: ICD-10-CM

## 2018-02-26 PROCEDURE — ? COUNSELING

## 2018-02-26 PROCEDURE — ? OBSERVATION

## 2018-02-26 ASSESSMENT — LOCATION SIMPLE DESCRIPTION DERM: LOCATION SIMPLE: LEFT CHEEK

## 2018-02-26 ASSESSMENT — LOCATION DETAILED DESCRIPTION DERM: LOCATION DETAILED: LEFT CENTRAL MALAR CHEEK

## 2018-02-26 ASSESSMENT — LOCATION ZONE DERM: LOCATION ZONE: FACE

## 2018-03-26 ENCOUNTER — OFFICE VISIT (OUTPATIENT)
Dept: MEDICAL GROUP | Facility: LAB | Age: 53
End: 2018-03-26
Payer: COMMERCIAL

## 2018-03-26 VITALS
BODY MASS INDEX: 40.46 KG/M2 | HEIGHT: 64 IN | WEIGHT: 237 LBS | OXYGEN SATURATION: 98 % | HEART RATE: 86 BPM | TEMPERATURE: 98.7 F | RESPIRATION RATE: 12 BRPM | SYSTOLIC BLOOD PRESSURE: 138 MMHG | DIASTOLIC BLOOD PRESSURE: 86 MMHG

## 2018-03-26 DIAGNOSIS — R73.01 ELEVATED FASTING GLUCOSE: ICD-10-CM

## 2018-03-26 DIAGNOSIS — Z12.11 SCREENING FOR COLON CANCER: ICD-10-CM

## 2018-03-26 DIAGNOSIS — F32.A DEPRESSION, UNSPECIFIED DEPRESSION TYPE: ICD-10-CM

## 2018-03-26 DIAGNOSIS — I10 ESSENTIAL HYPERTENSION: ICD-10-CM

## 2018-03-26 DIAGNOSIS — E78.5 HYPERLIPIDEMIA, UNSPECIFIED HYPERLIPIDEMIA TYPE: ICD-10-CM

## 2018-03-26 DIAGNOSIS — Z12.39 SCREENING FOR BREAST CANCER: ICD-10-CM

## 2018-03-26 DIAGNOSIS — B00.9 HSV INFECTION: ICD-10-CM

## 2018-03-26 PROCEDURE — 99214 OFFICE O/P EST MOD 30 MIN: CPT | Performed by: FAMILY MEDICINE

## 2018-03-26 RX ORDER — ACYCLOVIR 800 MG/1
TABLET ORAL
Qty: 30 TAB | Refills: 1 | Status: SHIPPED | OUTPATIENT
Start: 2018-03-26 | End: 2022-03-28 | Stop reason: SDUPTHER

## 2018-03-26 RX ORDER — AMLODIPINE BESYLATE 2.5 MG/1
2.5 TABLET ORAL DAILY
Qty: 30 TAB | Refills: 3 | Status: SHIPPED | OUTPATIENT
Start: 2018-03-26 | End: 2018-07-17 | Stop reason: SDUPTHER

## 2018-03-26 ASSESSMENT — PATIENT HEALTH QUESTIONNAIRE - PHQ9
CLINICAL INTERPRETATION OF PHQ2 SCORE: 4
5. POOR APPETITE OR OVEREATING: 2 - MORE THAN HALF THE DAYS
SUM OF ALL RESPONSES TO PHQ QUESTIONS 1-9: 11

## 2018-03-26 NOTE — PROGRESS NOTES
"Chief Complaint   Patient presents with   • Hypertension     follow up    • Orders Needed     labs/ mammogram/ Colonoscopy   • Depression     score 11 on screening       HISTORY OF PRESENT ILLNESS: Patient is a 52 y.o. female established patient who presents today to follow-up    Hypertension   This is a chronic problem.  Patient is currently not taking any medications for this. She was initially taking metoprolol. She then was changed over to lisinopril and then had a cough. She then was changed over to losartan and she did stop taking this medication.    Depression   This is new to discuss today. Patient's depression screening came up today at 11 which is consistent with moderate depression. She states that she is in agreement with this. She thinks that a lot of this is due to her relationship with her . She states her kids are a freshman and sophomore in college and are doing well. Patient states that her  is an alcoholic and he fallen off the wagon 3 times since she's been in rehabilitation last year. He was in rehabilitation January, February, March, April 2017. She reports that he \"fell off the wagon\" Justin Chapa and on valentines day. Patient states that she has an appointment with a therapist tomorrow. Patient has made up her mind that if he does not make a change she is going to get a divorce. She does go to [facility], [facility] and also does Bible study which helps her. She feels that her relationship is damaging her mentally. She states that she is done if they don't have a plan for him. Patient states that she's been on antidepressants in the past however this made her feel too numb. She denies any suicidal ideation    Depression Screening    Little interest or pleasure in doing things?  3 - nearly every day  Feeling down, depressed , or hopeless? 1 - several days  Trouble falling or staying asleep, or sleeping too much?  1 - several days  Feeling tired or having little energy?  2 - more " than half the days  Poor appetite or overeating?  2 - more than half the days  Feeling bad about yourself - or that you are a failure or have let yourself or your family down? 2 - more than half the days  Trouble concentrating on things, such as reading the newspaper or watching television? 0 - not at all  Moving or speaking so slowly that other people could have noticed.  Or the opposite - being so fidgety or restless that you have been moving around a lot more than usual?  0 - not at all  Thoughts that you would be better off dead, or of hurting yourself?  0 - not at all  Patient Health Questionnaire Score: 11      If depressive symptoms identified deferred to follow up visit unless specifically addressed in assesment and plan.    Interpretation of PHQ-9 Total Score   Score Severity   1-4 No Depression   5-9 Mild Depression   10-14 Moderate Depression   15-19 Moderately Severe Depression   20-27 Severe Depression    Lost weight and gained it back. Requesting orders for mammogram, colonscopy and labs     Patient Active Problem List    Diagnosis Date Noted   • Abnormal CBC 10/20/2016   • HSV-1 infection 10/20/2016   • Elevated fasting glucose 10/06/2016   • Hyperlipidemia 10/06/2016   • History of cold sores 10/06/2016   • Family history of diabetes mellitus in mother 10/06/2016   • Family history of colon cancer 10/06/2016   • Asthma in adult 04/24/2015   • Environmental allergies 04/24/2015   • Hypertension 07/01/2014   • Obesity, Class I, BMI 30-34.9         Allergies:Patient has no known allergies.    Social History     Social History   • Marital status:      Spouse name: N/A   • Number of children: N/A   • Years of education: N/A     Occupational History   • Not on file.     Social History Main Topics   • Smoking status: Never Smoker   • Smokeless tobacco: Never Used   • Alcohol use 2.5 oz/week     5 Standard drinks or equivalent per week   • Drug use: No   • Sexual activity: Yes     Partners: Male  "    Other Topics Concern   • Not on file     Social History Narrative   • No narrative on file       Current Outpatient Prescriptions:   •  acyclovir (ZOVIRAX) 800 MG Tab, TAKE 1 TABLET ORALLY 3 TIMES A DAY, Disp: 30 Tab, Rfl: 1  •  Cholecalciferol (VITAMIN D) 1000 UNIT CAPS, Take 1 Cap by mouth every day., Disp: , Rfl:   •  aspirin (ASA) 81 MG CHEW, Take 81 mg by mouth every day., Disp: , Rfl:       Family History   Problem Relation Age of Onset   • Diabetes Mother    • Genitourinary () Mother      renal failure, kidey transplant   • Hypertension Mother    • Thyroid Sister      2 with removal due to Graves   • Hypertension Brother    • Cancer Brother 60     colon cancer    • Cancer Paternal Grandmother      colon   • Cancer Paternal Grandfather      lung   • Cancer Maternal Aunt    • Hypertension Father    • Cancer Maternal Grandmother      breast       ROS:      Exam:    Blood pressure 138/86, pulse 86, temperature 37.1 °C (98.7 °F), resp. rate 12, height 1.626 m (5' 4\"), weight 107.5 kg (237 lb), SpO2 98 %.    General:  Well nourished, well developed female in NAD    Physical Exam   Constitutional:  Appears well-developed and well-nourished. Is active and cooperative.  Non-toxic appearance.   HENT:   Head: Normocephalic and atraumatic.   Right Ear: External ear normal. Left Ear: External ear normal.   Eyes: Conjunctivae, EOM and lids are normal. No scleral icterus.   Cardiovascular: Normal rate, regular rhythm and normal heart sounds.    Pulmonary/Chest: Effort normal and breath sounds normal.   Neurological:  No tremor. No display of seizure activity. Coordination and gait normal.   Skin: Skin is warm and dry. Patient is not diaphoretic.   Psychiatric: patient has a normal mood and affect; speech is normal and behavior is normal. No SI. Appropriately tearful        Assessment/Plan:    1. Essential hypertension  Uncontrolled. Start amlodipine 2.5 mg daily. Labs as below. She is to follow-up in one week  - " amLODIPine (NORVASC) 2.5 MG Tab; Take 1 Tab by mouth every day.  Dispense: 30 Tab; Refill: 3  - TSH; Future  - FREE THYROXINE; Future  - COMP METABOLIC PANEL; Future  - MICROALBUMIN CREAT RATIO URINE; Future  - CBC WITH DIFFERENTIAL; Future    2. Hyperlipidemia, unspecified hyperlipidemia type  Not on medication. Check labs  - COMP METABOLIC PANEL; Future  - LIPID PROFILE; Future    3. Depression, unspecified depression type  Patient is to see her therapist tomorrow. We came up with a list of 12 things that she can do for herself. There is a copy of this in her chart. She is to do at least 3 of these before she is seen next week for an appointment for follow-up. She is to get her labs done as ordered. She denies any suicidal ideation or plan. Patient feels that she  made to decision either her  gets on the wagon or she is done with the relationship. We did discuss this today. Patient and I discussed medication however we decided to not start medication at this time. Her insight is very good.    4. Elevated fasting glucose  Check fasting glucose as well as an A1c  - COMP METABOLIC PANEL; Future  - HEMOGLOBIN A1C; Future    5. Screening for colon cancer  Referral for colonoscopy completed  - REFERRAL TO GI FOR COLONOSCOPY    6. HSV infection  Refilled acyclovir  - acyclovir (ZOVIRAX) 800 MG Tab; TAKE 1 TABLET ORALLY 3 TIMES A DAY  Dispense: 30 Tab; Refill: 1    7. Screening for breast cancer  Mammogram ordered  - MA-MAMMO SCREENING BILAT W/JONO W/CAD; Future    F/u one week     Please note that this dictation was created using voice recognition software. I have made every reasonable attempt to correct obvious errors, but I expect that there are errors of grammar and possibly content that I did not discover before finalizing the note.

## 2018-03-27 ENCOUNTER — HOSPITAL ENCOUNTER (OUTPATIENT)
Dept: LAB | Facility: MEDICAL CENTER | Age: 53
End: 2018-03-27
Attending: FAMILY MEDICINE
Payer: COMMERCIAL

## 2018-03-27 DIAGNOSIS — E78.5 HYPERLIPIDEMIA, UNSPECIFIED HYPERLIPIDEMIA TYPE: ICD-10-CM

## 2018-03-27 DIAGNOSIS — I10 ESSENTIAL HYPERTENSION: ICD-10-CM

## 2018-03-27 DIAGNOSIS — R73.01 ELEVATED FASTING GLUCOSE: ICD-10-CM

## 2018-03-27 LAB
ALBUMIN SERPL BCP-MCNC: 4.5 G/DL (ref 3.2–4.9)
ALBUMIN/GLOB SERPL: 1.6 G/DL
ALP SERPL-CCNC: 62 U/L (ref 30–99)
ALT SERPL-CCNC: 22 U/L (ref 2–50)
ANION GAP SERPL CALC-SCNC: 7 MMOL/L (ref 0–11.9)
AST SERPL-CCNC: 16 U/L (ref 12–45)
BASOPHILS # BLD AUTO: 0.9 % (ref 0–1.8)
BASOPHILS # BLD: 0.04 K/UL (ref 0–0.12)
BILIRUB SERPL-MCNC: 0.6 MG/DL (ref 0.1–1.5)
BUN SERPL-MCNC: 13 MG/DL (ref 8–22)
CALCIUM SERPL-MCNC: 10.1 MG/DL (ref 8.5–10.5)
CHLORIDE SERPL-SCNC: 103 MMOL/L (ref 96–112)
CHOLEST SERPL-MCNC: 229 MG/DL (ref 100–199)
CO2 SERPL-SCNC: 27 MMOL/L (ref 20–33)
CREAT SERPL-MCNC: 0.66 MG/DL (ref 0.5–1.4)
CREAT UR-MCNC: 91.1 MG/DL
EOSINOPHIL # BLD AUTO: 0.08 K/UL (ref 0–0.51)
EOSINOPHIL NFR BLD: 1.8 % (ref 0–6.9)
ERYTHROCYTE [DISTWIDTH] IN BLOOD BY AUTOMATED COUNT: 39.1 FL (ref 35.9–50)
EST. AVERAGE GLUCOSE BLD GHB EST-MCNC: 105 MG/DL
GLOBULIN SER CALC-MCNC: 2.8 G/DL (ref 1.9–3.5)
GLUCOSE SERPL-MCNC: 103 MG/DL (ref 65–99)
HBA1C MFR BLD: 5.3 % (ref 0–5.6)
HCT VFR BLD AUTO: 44.2 % (ref 37–47)
HDLC SERPL-MCNC: 60 MG/DL
HGB BLD-MCNC: 14.1 G/DL (ref 12–16)
IMM GRANULOCYTES # BLD AUTO: 0.01 K/UL (ref 0–0.11)
IMM GRANULOCYTES NFR BLD AUTO: 0.2 % (ref 0–0.9)
LDLC SERPL CALC-MCNC: 134 MG/DL
LYMPHOCYTES # BLD AUTO: 1.38 K/UL (ref 1–4.8)
LYMPHOCYTES NFR BLD: 30.9 % (ref 22–41)
MCH RBC QN AUTO: 28.8 PG (ref 27–33)
MCHC RBC AUTO-ENTMCNC: 31.9 G/DL (ref 33.6–35)
MCV RBC AUTO: 90.4 FL (ref 81.4–97.8)
MICROALBUMIN UR-MCNC: <0.7 MG/DL
MICROALBUMIN/CREAT UR: NORMAL MG/G (ref 0–30)
MONOCYTES # BLD AUTO: 0.32 K/UL (ref 0–0.85)
MONOCYTES NFR BLD AUTO: 7.2 % (ref 0–13.4)
NEUTROPHILS # BLD AUTO: 2.64 K/UL (ref 2–7.15)
NEUTROPHILS NFR BLD: 59 % (ref 44–72)
NRBC # BLD AUTO: 0 K/UL
NRBC BLD-RTO: 0 /100 WBC
PLATELET # BLD AUTO: 290 K/UL (ref 164–446)
PMV BLD AUTO: 9.1 FL (ref 9–12.9)
POTASSIUM SERPL-SCNC: 3.9 MMOL/L (ref 3.6–5.5)
PROT SERPL-MCNC: 7.3 G/DL (ref 6–8.2)
RBC # BLD AUTO: 4.89 M/UL (ref 4.2–5.4)
SODIUM SERPL-SCNC: 137 MMOL/L (ref 135–145)
T4 FREE SERPL-MCNC: 0.84 NG/DL (ref 0.53–1.43)
TRIGL SERPL-MCNC: 177 MG/DL (ref 0–149)
TSH SERPL DL<=0.005 MIU/L-ACNC: 1.62 UIU/ML (ref 0.38–5.33)
WBC # BLD AUTO: 4.5 K/UL (ref 4.8–10.8)

## 2018-03-27 PROCEDURE — 82570 ASSAY OF URINE CREATININE: CPT

## 2018-03-27 PROCEDURE — 85025 COMPLETE CBC W/AUTO DIFF WBC: CPT

## 2018-03-27 PROCEDURE — 80053 COMPREHEN METABOLIC PANEL: CPT

## 2018-03-27 PROCEDURE — 84443 ASSAY THYROID STIM HORMONE: CPT

## 2018-03-27 PROCEDURE — 36415 COLL VENOUS BLD VENIPUNCTURE: CPT

## 2018-03-27 PROCEDURE — 82043 UR ALBUMIN QUANTITATIVE: CPT

## 2018-03-27 PROCEDURE — 84439 ASSAY OF FREE THYROXINE: CPT

## 2018-03-27 PROCEDURE — 83036 HEMOGLOBIN GLYCOSYLATED A1C: CPT

## 2018-03-27 PROCEDURE — 80061 LIPID PANEL: CPT

## 2018-04-03 ENCOUNTER — OFFICE VISIT (OUTPATIENT)
Dept: MEDICAL GROUP | Facility: LAB | Age: 53
End: 2018-04-03
Payer: COMMERCIAL

## 2018-04-03 VITALS
RESPIRATION RATE: 12 BRPM | TEMPERATURE: 98.5 F | HEART RATE: 80 BPM | SYSTOLIC BLOOD PRESSURE: 118 MMHG | HEIGHT: 64 IN | DIASTOLIC BLOOD PRESSURE: 86 MMHG | WEIGHT: 232 LBS | OXYGEN SATURATION: 95 % | BODY MASS INDEX: 39.61 KG/M2

## 2018-04-03 DIAGNOSIS — R73.01 ELEVATED FASTING GLUCOSE: ICD-10-CM

## 2018-04-03 DIAGNOSIS — I10 ESSENTIAL HYPERTENSION: ICD-10-CM

## 2018-04-03 DIAGNOSIS — E78.5 HYPERLIPIDEMIA, UNSPECIFIED HYPERLIPIDEMIA TYPE: ICD-10-CM

## 2018-04-03 DIAGNOSIS — F32.A DEPRESSION, UNSPECIFIED DEPRESSION TYPE: ICD-10-CM

## 2018-04-03 PROCEDURE — 99214 OFFICE O/P EST MOD 30 MIN: CPT | Performed by: FAMILY MEDICINE

## 2018-04-03 NOTE — PROGRESS NOTES
Chief Complaint   Patient presents with   • Hyperlipidemia     follow up labs       HISTORY OF PRESENT ILLNESS: Patient is a 52 y.o. female established patient who presents today to follow up     1. Essential hypertension  This is ongoing. She was started on amlodipine 2.5 mg daily at last visit      2. Hyperlipidemia, unspecified hyperlipidemia type  This is chronic. She is not on medication at this time. She is working on her diet.     3. Depression, unspecified depression type  This is ongoing. At last visit patient came up with 12 ideas of things she can do for self-care. She was able to complete 9 of the 12 items on her list. She states that she is feeling much better. She's been seen by her therapist and she will be going to see her therapist weekly. She's gone back to the gym. She is working on changing her eating. She thinks that she will be getting a divorce from her .    4. Elevated fasting glucose  Ongoing issue. Most recent labs show that her fasting glucose is 103 however her A1c is 5.3.      Patient Active Problem List    Diagnosis Date Noted   • Depression 03/26/2018   • Abnormal CBC 10/20/2016   • HSV-1 infection 10/20/2016   • Elevated fasting glucose 10/06/2016   • Hyperlipidemia 10/06/2016   • HSV infection 10/06/2016   • History of cold sores 10/06/2016   • Family history of diabetes mellitus in mother 10/06/2016   • Family history of colon cancer 10/06/2016   • Asthma in adult 04/24/2015   • Environmental allergies 04/24/2015   • Essential hypertension 07/01/2014   • Obesity, Class I, BMI 30-34.9         Allergies:Patient has no known allergies.    Current Outpatient Prescriptions   Medication Sig Dispense Refill   • acyclovir (ZOVIRAX) 800 MG Tab TAKE 1 TABLET ORALLY 3 TIMES A DAY 30 Tab 1   • amLODIPine (NORVASC) 2.5 MG Tab Take 1 Tab by mouth every day. 30 Tab 3   • Cholecalciferol (VITAMIN D) 1000 UNIT CAPS Take 1 Cap by mouth every day.     • aspirin (ASA) 81 MG CHEW Take 81 mg by  "mouth every day.       No current facility-administered medications for this visit.        Social History   Substance Use Topics   • Smoking status: Never Smoker   • Smokeless tobacco: Never Used   • Alcohol use 2.5 oz/week     5 Standard drinks or equivalent per week       Social History     Social History Narrative   • No narrative on file       Family History   Problem Relation Age of Onset   • Diabetes Mother    • Genitourinary () Mother      renal failure, kidey transplant   • Hypertension Mother    • Thyroid Sister      2 with removal due to Graves   • Hypertension Brother    • Cancer Brother 60     colon cancer    • Cancer Paternal Grandmother      colon   • Cancer Paternal Grandfather      lung   • Cancer Maternal Aunt    • Hypertension Father    • Cancer Maternal Grandmother      breast       ROS:            Exam:    /86   Pulse 80   Temp 36.9 °C (98.5 °F)   Resp 12   Ht 1.626 m (5' 4\")   Wt 105.2 kg (232 lb)   SpO2 95%   BMI 39.82 kg/m²       General:  Well nourished, well developed female in NAD    Physical Exam   Constitutional: Oriented to person, place, and time. Appears well-developed and well-nourished. Is active and cooperative.  Non-toxic appearance.   Head: Normocephalic and atraumatic.   Right Ear: External ear normal. Left Ear: External ear normal.   Eyes: Conjunctivae, EOM and lids are normal. No scleral icterus.   Neurological:  No tremor. No display of seizure activity. Coordination and gait normal.   Skin: Skin is warm and dry. Patient is not diaphoretic.   Psychiatric: patient has a normal mood and affect; behavior is normal. Insight is good, casually dressed. Speech is normal rate and volume      Hospital Outpatient Visit on 03/27/2018   Component Date Value Ref Range Status   • TSH 03/27/2018 1.620  0.380 - 5.330 uIU/mL Final    Comment: Please note new reference ranges effective 12/14/2017 10:00 AM  Pregnant Females, 1st Trimester  0.050-3.700  Pregnant Females, 2nd " Trimester  0.310-4.350  Pregnant Females, 3rd Trimester  0.410-5.180     • Free T-4 03/27/2018 0.84  0.53 - 1.43 ng/dL Final   • Sodium 03/27/2018 137  135 - 145 mmol/L Final   • Potassium 03/27/2018 3.9  3.6 - 5.5 mmol/L Final   • Chloride 03/27/2018 103  96 - 112 mmol/L Final   • Co2 03/27/2018 27  20 - 33 mmol/L Final   • Anion Gap 03/27/2018 7.0  0.0 - 11.9 Final   • Glucose 03/27/2018 103* 65 - 99 mg/dL Final   • Bun 03/27/2018 13  8 - 22 mg/dL Final   • Creatinine 03/27/2018 0.66  0.50 - 1.40 mg/dL Final   • Calcium 03/27/2018 10.1  8.5 - 10.5 mg/dL Final   • AST(SGOT) 03/27/2018 16  12 - 45 U/L Final   • ALT(SGPT) 03/27/2018 22  2 - 50 U/L Final   • Alkaline Phosphatase 03/27/2018 62  30 - 99 U/L Final   • Total Bilirubin 03/27/2018 0.6  0.1 - 1.5 mg/dL Final   • Albumin 03/27/2018 4.5  3.2 - 4.9 g/dL Final   • Total Protein 03/27/2018 7.3  6.0 - 8.2 g/dL Final   • Globulin 03/27/2018 2.8  1.9 - 3.5 g/dL Final   • A-G Ratio 03/27/2018 1.6  g/dL Final   • Cholesterol,Tot 03/27/2018 229* 100 - 199 mg/dL Final   • Triglycerides 03/27/2018 177* 0 - 149 mg/dL Final   • HDL 03/27/2018 60  >=40 mg/dL Final   • LDL 03/27/2018 134* <100 mg/dL Final   • Creatinine, Urine 03/27/2018 91.10  mg/dL Final   • Microalbumin, Urine Random 03/27/2018 <0.7  mg/dL Final   • Micro Alb Creat Ratio 03/27/2018 see below  0 - 30 mg/g Final    Comment: Unable to calculate the microalbumin/creatinine ratio due to  the microalbumin result or the urine creatinine result being  outside the measurement range of the analyzer.     • WBC 03/27/2018 4.5* 4.8 - 10.8 K/uL Final   • RBC 03/27/2018 4.89  4.20 - 5.40 M/uL Final   • Hemoglobin 03/27/2018 14.1  12.0 - 16.0 g/dL Final   • Hematocrit 03/27/2018 44.2  37.0 - 47.0 % Final   • MCV 03/27/2018 90.4  81.4 - 97.8 fL Final   • MCH 03/27/2018 28.8  27.0 - 33.0 pg Final   • MCHC 03/27/2018 31.9* 33.6 - 35.0 g/dL Final   • RDW 03/27/2018 39.1  35.9 - 50.0 fL Final   • Platelet Count 03/27/2018 290   164 - 446 K/uL Final   • MPV 03/27/2018 9.1  9.0 - 12.9 fL Final   • Neutrophils-Polys 03/27/2018 59.00  44.00 - 72.00 % Final   • Lymphocytes 03/27/2018 30.90  22.00 - 41.00 % Final   • Monocytes 03/27/2018 7.20  0.00 - 13.40 % Final   • Eosinophils 03/27/2018 1.80  0.00 - 6.90 % Final   • Basophils 03/27/2018 0.90  0.00 - 1.80 % Final   • Immature Granulocytes 03/27/2018 0.20  0.00 - 0.90 % Final   • Nucleated RBC 03/27/2018 0.00  /100 WBC Final   • Neutrophils (Absolute) 03/27/2018 2.64  2.00 - 7.15 K/uL Final   • Lymphs (Absolute) 03/27/2018 1.38  1.00 - 4.80 K/uL Final   • Monos (Absolute) 03/27/2018 0.32  0.00 - 0.85 K/uL Final   • Eos (Absolute) 03/27/2018 0.08  0.00 - 0.51 K/uL Final   • Baso (Absolute) 03/27/2018 0.04  0.00 - 0.12 K/uL Final   • Immature Granulocytes (abs) 03/27/2018 0.01  0.00 - 0.11 K/uL Final   • NRBC (Absolute) 03/27/2018 0.00  K/uL Final   • Glycohemoglobin 03/27/2018 5.3  0.0 - 5.6 % Final    Comment: Increased risk for diabetes:  5.7 -6.4%  Diabetes:  >6.4%  Glycemic control for adults with diabetes:  <7.0%  The above interpretations are per ADA guidelines.  Diagnosis  of diabetes mellitus on the basis of elevated Hemoglobin A1c  should be confirmed by repeating the Hb A1c test.     • Est Avg Glucose 03/27/2018 105  mg/dL Final    Comment: The eAG calculation is based on the A1c-Derived Daily Glucose  (ADAG) study.  See the ADA's website for additional information.     • GFR If  03/27/2018 >60  >60 mL/min/1.73 m 2 Final   • GFR If Non  03/27/2018 >60  >60 mL/min/1.73 m 2 Final         Assessment/Plan:     1. Hyperlipidemia, unspecified hyperlipidemia type  Uncontrolled. Reviewed labs with patient. She would like to work on diet and exercise and check labs again about 3-6 months.  - COMP METABOLIC PANEL; Future  - LIPID PROFILE; Future    2. Elevated fasting glucose  We discussed her elevated fasting glucose today and also her normal A1c.    3.  Depression, unspecified depression type  Patient is doing much better. She seen a therapist once a week. She is to continue doing this    Hypertension. Blood pressure is better. Will continue to monitor. Follow-up in one month        Please note that this dictation was created using voice recognition software. I have made every reasonable attempt to correct obvious errors, but I expect that there are errors of grammar and possibly content that I did not discover before finalizing the note.

## 2018-04-05 ENCOUNTER — HOSPITAL ENCOUNTER (OUTPATIENT)
Dept: RADIOLOGY | Facility: MEDICAL CENTER | Age: 53
End: 2018-04-05
Attending: FAMILY MEDICINE
Payer: COMMERCIAL

## 2018-04-05 DIAGNOSIS — Z12.39 SCREENING FOR BREAST CANCER: ICD-10-CM

## 2018-04-05 PROCEDURE — 77067 SCR MAMMO BI INCL CAD: CPT

## 2018-05-04 ENCOUNTER — OFFICE VISIT (OUTPATIENT)
Dept: MEDICAL GROUP | Facility: LAB | Age: 53
End: 2018-05-04
Payer: COMMERCIAL

## 2018-05-04 VITALS
SYSTOLIC BLOOD PRESSURE: 120 MMHG | RESPIRATION RATE: 12 BRPM | OXYGEN SATURATION: 95 % | HEART RATE: 82 BPM | HEIGHT: 64 IN | DIASTOLIC BLOOD PRESSURE: 80 MMHG | WEIGHT: 237 LBS | TEMPERATURE: 97.4 F | BODY MASS INDEX: 40.46 KG/M2

## 2018-05-04 DIAGNOSIS — R92.30 DENSE BREAST TISSUE ON MAMMOGRAM: ICD-10-CM

## 2018-05-04 DIAGNOSIS — I10 ESSENTIAL HYPERTENSION: ICD-10-CM

## 2018-05-04 DIAGNOSIS — E66.01 MORBID OBESITY WITH BMI OF 40.0-44.9, ADULT (HCC): ICD-10-CM

## 2018-05-04 DIAGNOSIS — F32.A DEPRESSION, UNSPECIFIED DEPRESSION TYPE: ICD-10-CM

## 2018-05-04 PROCEDURE — 99214 OFFICE O/P EST MOD 30 MIN: CPT | Performed by: FAMILY MEDICINE

## 2018-05-04 ASSESSMENT — PATIENT HEALTH QUESTIONNAIRE - PHQ9: CLINICAL INTERPRETATION OF PHQ2 SCORE: 0

## 2018-05-04 NOTE — PROGRESS NOTES
"Chief Complaint   Patient presents with   • Hypertension     follow up        HISTORY OF PRESENT ILLNESS: Patient is a 53 y.o. female established patient who presents today to follow up       Essential hypertension  This is a chronic problem. Patient is here to follow-up on her blood pressure. She is on amlodipine 2.5 mg once daily. Tolerating medication without any problems    Depression  This is a chronic problem. Patient is working on her self-care. She is also seen a therapist Ivon Ospina. She is doing good and is seeing her once a month. Patient states that she got her  to sign legal separation papers. They're both still living in the same house. Patient's  struggles with alcoholism and needs to go to a sober living environment. Patient is learning about codependency and has been reading a book \"co dependent no more\".  Also reading a book about grief and loss. She is reading Al-Anon material.     Saw Dr Sorensen and mammo and pap normal . Reports that she has dense breast tissue on her mammogram    Feels great, no cold, no flu. No fever     Having coloncsopy and endoscopy on 5/2016    Patient would like a referral to Dr Hanny Burton for medically supervised weight loss.      Patient Active Problem List    Diagnosis Date Noted   • Depression 03/26/2018   • Abnormal CBC 10/20/2016   • HSV-1 infection 10/20/2016   • Elevated fasting glucose 10/06/2016   • Hyperlipidemia 10/06/2016   • HSV infection 10/06/2016   • History of cold sores 10/06/2016   • Family history of diabetes mellitus in mother 10/06/2016   • Family history of colon cancer 10/06/2016   • Asthma in adult 04/24/2015   • Environmental allergies 04/24/2015   • Essential hypertension 07/01/2014   • Obesity, Class I, BMI 30-34.9         Allergies:Patient has no known allergies.    Current Outpatient Prescriptions   Medication Sig Dispense Refill   • acyclovir (ZOVIRAX) 800 MG Tab TAKE 1 TABLET ORALLY 3 TIMES A DAY 30 Tab 1   • " "amLODIPine (NORVASC) 2.5 MG Tab Take 1 Tab by mouth every day. 30 Tab 3   • Cholecalciferol (VITAMIN D) 1000 UNIT CAPS Take 1 Cap by mouth every day.     • aspirin (ASA) 81 MG CHEW Take 81 mg by mouth every day.       No current facility-administered medications for this visit.        Social History   Substance Use Topics   • Smoking status: Never Smoker   • Smokeless tobacco: Never Used   • Alcohol use 2.5 oz/week     5 Standard drinks or equivalent per week       Social History     Social History Narrative   • No narrative on file       Family History   Problem Relation Age of Onset   • Diabetes Mother    • Genitourinary () Mother      renal failure, kidey transplant   • Hypertension Mother    • Thyroid Sister      2 with removal due to Graves   • Hypertension Brother    • Cancer Brother 60     colon cancer    • Cancer Paternal Grandmother      colon   • Cancer Paternal Grandfather      lung   • Cancer Maternal Aunt    • Hypertension Father    • Cancer Maternal Grandmother      breast        Exam:    /80   Pulse 82   Temp 36.3 °C (97.4 °F)   Resp 12   Ht 1.626 m (5' 4\")   Wt 107.5 kg (237 lb)   SpO2 95%   BMI 40.68 kg/m²     General:  Well nourished, well developed female in NAD    Physical Exam   Constitutional:  Appears well-developed and well-nourished. Is active and cooperative.  Non-toxic appearance.   Head: Normocephalic and atraumatic.   Right Ear: External ear normal.  Left Ear: External ear normal.   Eyes: Conjunctivae, EOM and lids are normal. No scleral icterus.   Cardiovascular: Normal rate, regular rhythm and normal heart sounds.    Pulmonary/Chest: Effort normal and breath sounds normal.   Neurological: Alert . No tremor. No display of seizure activity. Coordination and gait normal.   Skin: Skin is warm and dry. Patient is not diaphoretic.   Psychiatric: patient has a normal mood and affect; speech is normal and behavior is normal.       Assessment/Plan:    1. Essential " hypertension  Controlled. No change in medication  - REFERRAL TO [facility] IMPROVEMENT PROGRAMS (HIP) Services Requested: Physician Medical Weight Management Program, Registered Dietitian for Medical Nutrition Therapy; Reason for Referral? BMI>30; Reason for Visit: Overweight/Obesity, Medical Co...    2. Depression, unspecified depression type  Patient is doing well. She is to continue with counseling    3. Dense breast tissue on mammogram  Additional imaging ordered  - US-SCREENING WHOLE BREAST BILATERAL (3D SCREENING); Future    4. Morbid obesity with BMI of 40.0-44.9, adult (HCC)  Uncontrolled. Discussed with patient. Referral to health improvement program.  - REFERRAL TO [facility] IMPROVEMENT PROGRAMS (HIP) Services Requested: Physician Medical Weight Management Program, Registered Dietitian for Medical Nutrition Therapy; Reason for Referral? BMI>30; Reason for Visit: Overweight/Obesity, Medical Co...    Patient is to follow-up in 2 months, sooner when necessary.    Please note that this dictation was created using voice recognition software. I have made every reasonable attempt to correct obvious errors, but I expect that there are errors of grammar and possibly content that I did not discover before finalizing the note.

## 2018-05-04 NOTE — ASSESSMENT & PLAN NOTE
This is a chronic problem. Patient is here to follow-up on her blood pressure. She is on amlodipine 2.5 mg once daily

## 2018-05-07 ENCOUNTER — OFFICE VISIT (OUTPATIENT)
Dept: CARDIOLOGY | Facility: MEDICAL CENTER | Age: 53
End: 2018-05-07
Payer: COMMERCIAL

## 2018-05-07 VITALS
DIASTOLIC BLOOD PRESSURE: 90 MMHG | OXYGEN SATURATION: 96 % | SYSTOLIC BLOOD PRESSURE: 120 MMHG | WEIGHT: 239 LBS | HEIGHT: 65 IN | BODY MASS INDEX: 39.82 KG/M2 | HEART RATE: 94 BPM

## 2018-05-07 DIAGNOSIS — R00.2 PALPITATIONS: ICD-10-CM

## 2018-05-07 DIAGNOSIS — R06.83 SNORING: ICD-10-CM

## 2018-05-07 DIAGNOSIS — I10 ESSENTIAL HYPERTENSION: ICD-10-CM

## 2018-05-07 DIAGNOSIS — E66.9 OBESITY (BMI 30-39.9): ICD-10-CM

## 2018-05-07 PROCEDURE — 93000 ELECTROCARDIOGRAM COMPLETE: CPT | Performed by: INTERNAL MEDICINE

## 2018-05-07 PROCEDURE — 99203 OFFICE O/P NEW LOW 30 MIN: CPT | Performed by: INTERNAL MEDICINE

## 2018-05-07 ASSESSMENT — ENCOUNTER SYMPTOMS
NAUSEA: 0
NERVOUS/ANXIOUS: 0
CHILLS: 0
BLURRED VISION: 0
EYE PAIN: 0
WHEEZING: 0
COUGH: 0
EYE DISCHARGE: 0
VOMITING: 0
SPEECH CHANGE: 0
ABDOMINAL PAIN: 0
BRUISES/BLEEDS EASILY: 0
PALPITATIONS: 1
MYALGIAS: 0
FEVER: 0
LOSS OF CONSCIOUSNESS: 0
DEPRESSION: 0
HEMOPTYSIS: 0

## 2018-05-07 NOTE — LETTER
[facility]   [address] [place] [address]  [place] [address]  Phone: [phone]  Fax: [phone]              Vicky Evans  1965    Encounter Date: 5/7/2018    Damián Pichardo M.D.          CARDIOLOGY NEW PATIENT EVALUATION NOTE:  No notes on file      No Recipients

## 2018-05-07 NOTE — PROGRESS NOTES
"Chief Complaint   Patient presents with   • HTN (Controlled)     follow up    Palpitations    Subjective:   Vicky Evans is a 53 y.o. female who presents today for evaluation of above issues    She was seen once in November of 2013 for palpitations.   She was under significant work related stress at the time.  EKG showed ventricular bigeminy.   ECHO showed mild mitral regurgitation with normal chamber dimensions and LV function  Stress MPI showed no ischemia.  She was tired on metoprolol but it made her lethargic and had to be discontinued.    She was also subsequently diagnosed with HTN.  Over the years, she has been tried on a couple other antihypertensives but lately has been on amlodipine low dose which appeared to control her BP quite well.  Lately she has been having vasile-menopausal symptoms.   With hot flashes, she would at times feel her heart racing.  She also at time feels \"tingling\" in her mid chest. It is generally brief and not related to exertion. She denies associated dyspnea, nausea or vomiting.      Past Medical History:   Diagnosis Date   • Arrhythmia     rapid heart rate    • Chest discomfort 11/18/2013   • Obesity    • Palpitations 11/18/2013   • Psychiatric disorder     anxiety   • Snoring    • Ventricular bigeminy 11/18/2013     History reviewed. No pertinent surgical history.  Family History   Problem Relation Age of Onset   • Diabetes Mother    • Genitourinary () Mother      renal failure, kidey transplant   • Hypertension Mother    • Thyroid Sister      2 with removal due to Graves   • Hypertension Brother    • Cancer Brother 60     colon cancer    • Cancer Paternal Grandmother      colon   • Cancer Paternal Grandfather      lung   • Cancer Maternal Aunt    • Hypertension Father    • Cancer Maternal Grandmother      breast   • Heart Disease Neg Hx      Social History     Social History   • Marital status:      Spouse name: N/A   • Number of children: N/A   • Years of education: " "N/A     Occupational History   • Not on file.     Social History Main Topics   • Smoking status: Never Smoker   • Smokeless tobacco: Never Used   • Alcohol use 2.5 oz/week     5 Standard drinks or equivalent per week   • Drug use: No   • Sexual activity: Yes     Partners: Male     Other Topics Concern   • Not on file     Social History Narrative   • No narrative on file     No Known Allergies  Outpatient Encounter Prescriptions as of 5/7/2018   Medication Sig Dispense Refill   • acyclovir (ZOVIRAX) 800 MG Tab TAKE 1 TABLET ORALLY 3 TIMES A DAY (Patient taking differently: as needed. TAKE 1 TABLET ORALLY 3 TIMES A DAY) 30 Tab 1   • amLODIPine (NORVASC) 2.5 MG Tab Take 1 Tab by mouth every day. 30 Tab 3   • Cholecalciferol (VITAMIN D) 1000 UNIT CAPS Take 1 Cap by mouth every day.     • aspirin (ASA) 81 MG CHEW Take 81 mg by mouth every day.       No facility-administered encounter medications on file as of 5/7/2018.      Review of Systems   Constitutional: Negative for chills and fever.        Gained some weight  Having intermittent hot flashes   HENT: Negative for congestion.    Eyes: Negative for blurred vision, pain and discharge.   Respiratory: Negative for cough, hemoptysis and wheezing.         Snores   Cardiovascular: Positive for palpitations. Negative for chest pain.        Feel like heart racing, occur randomly  Denies associated dizziness or dyspnea   Gastrointestinal: Negative for abdominal pain, nausea and vomiting.   Musculoskeletal: Negative for joint pain and myalgias.   Skin: Negative for itching and rash.   Neurological: Negative for speech change and loss of consciousness.   Endo/Heme/Allergies: Does not bruise/bleed easily.   Psychiatric/Behavioral: Negative for depression. The patient is not nervous/anxious.         Slightly more stress lately   All other systems reviewed and are negative.       Objective:   /90   Pulse 94   Ht 1.651 m (5' 5\")   Wt 108.4 kg (239 lb)   SpO2 96%   BMI " 39.77 kg/m²     Physical Exam   Constitutional: She is oriented to person, place, and time.   Obese, NAD   Neck: No JVD present.   Cardiovascular: Normal rate and regular rhythm.  Exam reveals distant heart sounds. Exam reveals no gallop.    No murmur heard.  Pulmonary/Chest: Effort normal and breath sounds normal. No respiratory distress. She has no wheezes. She has no rales.   Abdominal: Soft. She exhibits no distension. There is no tenderness.   Musculoskeletal: She exhibits no edema.   Neurological: She is alert and oriented to person, place, and time.   Skin: Skin is warm and dry. No erythema.   Psychiatric: She has a normal mood and affect. Her behavior is normal.     EKG today by my review showed sinus rhythm, leftward axis, otherwise unremarkable, no change from 2013    CMP 3/2018 normal except glucose 103, lipid , HDL 60    Assessment:     1. Essential hypertension  EKG    ECHOCARDIOGRAM COMP W/O CONT   2. Palpitations  ECHOCARDIOGRAM COMP W/O CONT   3. Snoring  OVERNIGHT PULSE OXIMETRY   4. Obesity (BMI 30-39.9)  OVERNIGHT PULSE OXIMETRY    LIPID PROFILE       Medical Decision Making:  Today's Assessment / Status / Plan:     She used to have significant PVCs but reported more of tachycardia lately.   This is to certain likely related to her current vasile-menopausal issue.  Will assess this further with echocardiography and OPO.  She was advised to watch her diet and try weight reduction.   Will recheck her lipid in a few months.  I did not change her medication at this time.  Will see her back in a few months or sooner as needed.  We will keep you posted about our findings and further recommendations as they become available. Please also do not hesitate to call for any questions.  Thank you kindly for allowing me to participate in the care of this patient.

## 2018-05-08 ENCOUNTER — TELEPHONE (OUTPATIENT)
Dept: CARDIOLOGY | Facility: MEDICAL CENTER | Age: 53
End: 2018-05-08

## 2018-05-08 LAB — EKG IMPRESSION: NORMAL

## 2018-07-17 DIAGNOSIS — I10 ESSENTIAL HYPERTENSION: ICD-10-CM

## 2018-07-17 RX ORDER — AMLODIPINE BESYLATE 2.5 MG/1
TABLET ORAL
Qty: 30 TAB | Refills: 3 | Status: SHIPPED | OUTPATIENT
Start: 2018-07-17 | End: 2019-01-04 | Stop reason: SDUPTHER

## 2018-11-06 ENCOUNTER — APPOINTMENT (RX ONLY)
Dept: URBAN - METROPOLITAN AREA CLINIC 20 | Facility: CLINIC | Age: 53
Setting detail: DERMATOLOGY
End: 2018-11-06

## 2018-11-06 DIAGNOSIS — L81.4 OTHER MELANIN HYPERPIGMENTATION: ICD-10-CM

## 2018-11-06 DIAGNOSIS — D22 MELANOCYTIC NEVI: ICD-10-CM

## 2018-11-06 DIAGNOSIS — Z71.89 OTHER SPECIFIED COUNSELING: ICD-10-CM

## 2018-11-06 DIAGNOSIS — L82.0 INFLAMED SEBORRHEIC KERATOSIS: ICD-10-CM

## 2018-11-06 DIAGNOSIS — L82.1 OTHER SEBORRHEIC KERATOSIS: ICD-10-CM

## 2018-11-06 DIAGNOSIS — D18.0 HEMANGIOMA: ICD-10-CM

## 2018-11-06 PROBLEM — D22.5 MELANOCYTIC NEVI OF TRUNK: Status: ACTIVE | Noted: 2018-11-06

## 2018-11-06 PROBLEM — D48.5 NEOPLASM OF UNCERTAIN BEHAVIOR OF SKIN: Status: ACTIVE | Noted: 2018-11-06

## 2018-11-06 PROBLEM — D22.71 MELANOCYTIC NEVI OF RIGHT LOWER LIMB, INCLUDING HIP: Status: ACTIVE | Noted: 2018-11-06

## 2018-11-06 PROBLEM — D22.61 MELANOCYTIC NEVI OF RIGHT UPPER LIMB, INCLUDING SHOULDER: Status: ACTIVE | Noted: 2018-11-06

## 2018-11-06 PROBLEM — D22.72 MELANOCYTIC NEVI OF LEFT LOWER LIMB, INCLUDING HIP: Status: ACTIVE | Noted: 2018-11-06

## 2018-11-06 PROBLEM — D22.62 MELANOCYTIC NEVI OF LEFT UPPER LIMB, INCLUDING SHOULDER: Status: ACTIVE | Noted: 2018-11-06

## 2018-11-06 PROBLEM — D22.39 MELANOCYTIC NEVI OF OTHER PARTS OF FACE: Status: ACTIVE | Noted: 2018-11-06

## 2018-11-06 PROBLEM — D18.01 HEMANGIOMA OF SKIN AND SUBCUTANEOUS TISSUE: Status: ACTIVE | Noted: 2018-11-06

## 2018-11-06 PROCEDURE — ? COUNSELING

## 2018-11-06 PROCEDURE — 11100: CPT

## 2018-11-06 PROCEDURE — 99214 OFFICE O/P EST MOD 30 MIN: CPT | Mod: 25

## 2018-11-06 PROCEDURE — ? SUNSCREEN RECOMMENDATIONS

## 2018-11-06 PROCEDURE — ? BIOPSY BY SHAVE METHOD

## 2018-11-06 ASSESSMENT — LOCATION DETAILED DESCRIPTION DERM
LOCATION DETAILED: LEFT DISTAL POSTERIOR THIGH
LOCATION DETAILED: RIGHT PROXIMAL PRETIBIAL REGION
LOCATION DETAILED: LEFT VENTRAL PROXIMAL FOREARM
LOCATION DETAILED: RIGHT SUPERIOR UPPER BACK
LOCATION DETAILED: RIGHT MEDIAL UPPER BACK
LOCATION DETAILED: RIGHT INFERIOR MEDIAL UPPER BACK
LOCATION DETAILED: INFERIOR THORACIC SPINE
LOCATION DETAILED: LEFT LATERAL PROXIMAL PRETIBIAL REGION
LOCATION DETAILED: RIGHT INFERIOR FOREHEAD
LOCATION DETAILED: RIGHT VENTRAL PROXIMAL FOREARM
LOCATION DETAILED: RIGHT DISTAL POSTERIOR THIGH
LOCATION DETAILED: LEFT PROXIMAL POSTERIOR UPPER ARM
LOCATION DETAILED: RIGHT INFERIOR MEDIAL MIDBACK
LOCATION DETAILED: RIGHT INFERIOR CENTRAL MALAR CHEEK
LOCATION DETAILED: RIGHT DISTAL POSTERIOR UPPER ARM

## 2018-11-06 ASSESSMENT — LOCATION SIMPLE DESCRIPTION DERM
LOCATION SIMPLE: RIGHT LOWER BACK
LOCATION SIMPLE: UPPER BACK
LOCATION SIMPLE: RIGHT CHEEK
LOCATION SIMPLE: LEFT PRETIBIAL REGION
LOCATION SIMPLE: LEFT POSTERIOR THIGH
LOCATION SIMPLE: RIGHT FOREARM
LOCATION SIMPLE: RIGHT POSTERIOR UPPER ARM
LOCATION SIMPLE: RIGHT FOREHEAD
LOCATION SIMPLE: RIGHT UPPER BACK
LOCATION SIMPLE: LEFT POSTERIOR UPPER ARM
LOCATION SIMPLE: LEFT FOREARM
LOCATION SIMPLE: RIGHT POSTERIOR THIGH
LOCATION SIMPLE: RIGHT PRETIBIAL REGION

## 2018-11-06 ASSESSMENT — LOCATION ZONE DERM
LOCATION ZONE: FACE
LOCATION ZONE: LEG
LOCATION ZONE: ARM
LOCATION ZONE: TRUNK

## 2018-11-06 NOTE — PROCEDURE: BIOPSY BY SHAVE METHOD
Dressing: Band-Aid
Lab Facility: 
Billing Type: Third-Party Bill
Was A Bandage Applied: Yes
Anesthesia Volume In Cc: 1
Consent: Written consent was obtained and risks were reviewed including but not limited to scarring, infection, bleeding, scabbing, incomplete removal, nerve damage and allergy to anesthesia.
Bill For Surgical Tray: no
Wound Care: Bacitracin
Depth Of Biopsy: dermis
Size Of Lesion In Cm: 0.2
Hemostasis: Drysol and Electrocautery
X Size Of Lesion In Cm: 0.3
Biopsy Type: H and E
Lab: [id number]
Notification Instructions: Patient will be notified of biopsy results. However, patient instructed to call the office if not contacted within 2 weeks.
Biopsy Method: Personna blade
Post-Care Instructions: I reviewed with the patient in detail post-care instructions. Patient is to keep the biopsy site clean once daily and then apply petroleum and bandaid  until healed.
Detail Level: Detailed
Type Of Destruction Used: Curettage
Additional Anesthesia Volume In Cc (Will Not Render If 0): 0
Anesthesia Type: 1% lidocaine with 1:100,000 epinephrine and a 1:12 solution of 8.4% sodium bicarbonate

## 2018-11-09 ENCOUNTER — HOSPITAL ENCOUNTER (OUTPATIENT)
Dept: LAB | Facility: MEDICAL CENTER | Age: 53
End: 2018-11-09
Payer: COMMERCIAL

## 2018-11-09 LAB
BDY FAT % MEASURED: 46.7 %
BP DIAS: 84 MMHG
BP SYS: 130 MMHG
CHOLEST SERPL-MCNC: 211 MG/DL (ref 100–199)
DIABETES HTDIA: NO
EVENT NAME HTEVT: NORMAL
FASTING STATUS PATIENT QL REPORTED: NORMAL
GLUCOSE SERPL-MCNC: 80 MG/DL (ref 65–99)
HDLC SERPL-MCNC: 60 MG/DL
HYPERTENSION HTHYP: YES
LDLC SERPL CALC-MCNC: 125 MG/DL
SCREENING LOC CITY HTCIT: NORMAL
SCREENING LOC STATE HTSTA: NORMAL
SCREENING LOCATION HTLOC: NORMAL
SMOKING HTSMO: NO
SUBSCRIBER ID HTSID: NORMAL
TRIGL SERPL-MCNC: 128 MG/DL (ref 0–149)

## 2018-11-09 PROCEDURE — S5190 WELLNESS ASSESSMENT BY NONPH: HCPCS

## 2018-11-09 PROCEDURE — 80061 LIPID PANEL: CPT

## 2018-11-09 PROCEDURE — 36415 COLL VENOUS BLD VENIPUNCTURE: CPT

## 2018-11-09 PROCEDURE — 82947 ASSAY GLUCOSE BLOOD QUANT: CPT

## 2019-01-04 DIAGNOSIS — I10 ESSENTIAL HYPERTENSION: ICD-10-CM

## 2019-01-04 RX ORDER — AMLODIPINE BESYLATE 2.5 MG/1
TABLET ORAL
Qty: 30 TAB | Refills: 1 | Status: SHIPPED | OUTPATIENT
Start: 2019-01-04 | End: 2019-02-06 | Stop reason: SDUPTHER

## 2019-01-04 NOTE — TELEPHONE ENCOUNTER
Phone Number Called: [phone] (home)     Message: I left a voicemail that Dr. Vargas has  approved her refill, please call our scheduling department to set up an appointment with a Pcp to establish care.    Left Message for patient to call back: yes

## 2019-01-04 NOTE — TELEPHONE ENCOUNTER
Was the patient seen in the last year in this department? Yes lov 5/4/18    Does patient have an active prescription for medications requested? No     Received Request Via: Pharmacy

## 2019-01-07 ENCOUNTER — OFFICE VISIT (OUTPATIENT)
Dept: MEDICAL GROUP | Facility: LAB | Age: 54
End: 2019-01-07
Payer: COMMERCIAL

## 2019-01-07 VITALS
RESPIRATION RATE: 12 BRPM | HEIGHT: 64 IN | SYSTOLIC BLOOD PRESSURE: 116 MMHG | OXYGEN SATURATION: 94 % | DIASTOLIC BLOOD PRESSURE: 84 MMHG | TEMPERATURE: 98.2 F | BODY MASS INDEX: 41.32 KG/M2 | HEART RATE: 94 BPM | WEIGHT: 242 LBS

## 2019-01-07 DIAGNOSIS — Z12.39 BREAST CANCER SCREENING: ICD-10-CM

## 2019-01-07 DIAGNOSIS — F32.A DEPRESSION, UNSPECIFIED DEPRESSION TYPE: ICD-10-CM

## 2019-01-07 DIAGNOSIS — Z00.00 PREVENTATIVE HEALTH CARE: ICD-10-CM

## 2019-01-07 DIAGNOSIS — E66.01 MORBID OBESITY WITH BMI OF 40.0-44.9, ADULT (HCC): ICD-10-CM

## 2019-01-07 DIAGNOSIS — Z23 NEED FOR VACCINATION: ICD-10-CM

## 2019-01-07 DIAGNOSIS — I10 HYPERTENSION, UNSPECIFIED TYPE: ICD-10-CM

## 2019-01-07 PROCEDURE — 99396 PREV VISIT EST AGE 40-64: CPT | Mod: 25 | Performed by: FAMILY MEDICINE

## 2019-01-07 PROCEDURE — 90686 IIV4 VACC NO PRSV 0.5 ML IM: CPT | Performed by: FAMILY MEDICINE

## 2019-01-07 PROCEDURE — 90471 IMMUNIZATION ADMIN: CPT | Performed by: FAMILY MEDICINE

## 2019-01-07 ASSESSMENT — PATIENT HEALTH QUESTIONNAIRE - PHQ9: CLINICAL INTERPRETATION OF PHQ2 SCORE: 0

## 2019-02-03 ENCOUNTER — OFFICE VISIT (OUTPATIENT)
Dept: URGENT CARE | Facility: CLINIC | Age: 54
End: 2019-02-03
Payer: COMMERCIAL

## 2019-02-03 VITALS
HEART RATE: 92 BPM | WEIGHT: 242 LBS | TEMPERATURE: 98 F | SYSTOLIC BLOOD PRESSURE: 140 MMHG | BODY MASS INDEX: 41.32 KG/M2 | HEIGHT: 64 IN | RESPIRATION RATE: 20 BRPM | OXYGEN SATURATION: 96 % | DIASTOLIC BLOOD PRESSURE: 88 MMHG

## 2019-02-03 DIAGNOSIS — S61.212A LACERATION OF RIGHT MIDDLE FINGER WITHOUT FOREIGN BODY WITHOUT DAMAGE TO NAIL, INITIAL ENCOUNTER: ICD-10-CM

## 2019-02-03 PROCEDURE — 12001 RPR S/N/AX/GEN/TRNK 2.5CM/<: CPT | Mod: F7 | Performed by: PHYSICIAN ASSISTANT

## 2019-02-03 ASSESSMENT — ENCOUNTER SYMPTOMS
FEVER: 0
MYALGIAS: 0
BRUISES/BLEEDS EASILY: 0

## 2019-02-03 NOTE — PROGRESS NOTES
"  Subjective:   Vicky Nino is a 53 y.o. female who presents today with   Chief Complaint   Patient presents with   • Laceration     couple hrs ago cute rightmiddle finger tip with a blade       Laceration    The incident occurred 1 to 3 hours ago. The laceration is located on the right hand. The laceration is 2 cm in size. Injury mechanism:  blade. She reports no foreign bodies present. Her tetanus status is UTD.     Patient states she was cut on her  after preparing food.    PMH:  has a past medical history of Arrhythmia; Chest discomfort (11/18/2013); Depression; Hypertension; Obesity; Palpitations (11/18/2013); Psychiatric disorder; Snoring; and Ventricular bigeminy (11/18/2013).  MEDS:   Current Outpatient Prescriptions:   •  amLODIPine (NORVASC) 2.5 MG Tab, TAKE 1 TABLET BY MOUTH EVERY DAY, Disp: 30 Tab, Rfl: 1  •  acyclovir (ZOVIRAX) 800 MG Tab, TAKE 1 TABLET ORALLY 3 TIMES A DAY (Patient taking differently: as needed. TAKE 1 TABLET ORALLY 3 TIMES A DAY), Disp: 30 Tab, Rfl: 1  •  Cholecalciferol (VITAMIN D) 1000 UNIT CAPS, Take 1 Cap by mouth every day., Disp: , Rfl:   •  aspirin (ASA) 81 MG CHEW, Take 81 mg by mouth every day., Disp: , Rfl:   ALLERGIES: No Known Allergies  SURGHX: No past surgical history on file.  SOCHX:  reports that she has never smoked. She has never used smokeless tobacco. She reports that she drinks about 2.5 oz of alcohol per week . She reports that she does not use drugs.  FH: Reviewed with patient, not pertinent to this visit.       Review of Systems   Constitutional: Negative for fever.   Musculoskeletal: Negative for myalgias.   Skin: Negative for itching and rash.   Endo/Heme/Allergies: Does not bruise/bleed easily.   All other systems reviewed and are negative.       Objective:   /88 (BP Location: Left arm, Patient Position: Sitting, BP Cuff Size: Large adult)   Pulse 92   Temp 36.7 °C (98 °F) (Temporal)   Resp 20   Ht 1.626 m (5' 4\")  "  Wt 109.8 kg (242 lb)   LMP 09/18/2013   SpO2 96%   BMI 41.54 kg/m²   Physical Exam   Constitutional: She is oriented to person, place, and time. She appears well-developed and well-nourished.   HENT:   Head: Normocephalic and atraumatic.   Eyes: Pupils are equal, round, and reactive to light.   Cardiovascular: Normal rate, regular rhythm and normal heart sounds.    Pulmonary/Chest: Effort normal and breath sounds normal.   Musculoskeletal: Normal range of motion.        Arms:  Small 2 cm laceration on right middle finger   Neurological: She is alert and oriented to person, place, and time.   Skin: Skin is warm and dry.   Psychiatric: She has a normal mood and affect.   Nursing note and vitals reviewed.        Assessment/Plan:   Assessment    1. Laceration of right middle finger without foreign body without damage to nail, initial encounter  Patient tolerated sutures. Patient educated on keeping the are clean and dry. Come back in 7 days to have the stitches removed.    Differential diagnosis, natural history, supportive care, and indications for immediate follow-up discussed.      If not improving in 3-5 days, F/U with PCP or return to  or sooner if worsens  Patient agreeable to plan.      Alberto Olivares PA-C  This case was discussed and reviewed with Dr Knutson during Alberto HICKEY's   training period.

## 2019-02-03 NOTE — PROCEDURES
Procedure: Laceration Repair  -Risks including bleeding, nerve damage, infection, and poor cosmetic outcome discussed at length. Benefits and alternatives discussed.   -Sterile technique throughout  -Local anesthesia with 2% lidocaine  -Closed with 5 -0 Nylon 2 interrupted sutures with good wound approximation  -Polysporin and dressing placed  -Patient tolerated well

## 2019-02-06 DIAGNOSIS — I10 ESSENTIAL HYPERTENSION: ICD-10-CM

## 2019-02-07 RX ORDER — AMLODIPINE BESYLATE 2.5 MG/1
2.5 TABLET ORAL
Qty: 90 TAB | Refills: 1 | Status: SHIPPED | OUTPATIENT
Start: 2019-02-07 | End: 2019-08-20 | Stop reason: SDUPTHER

## 2019-02-07 NOTE — TELEPHONE ENCOUNTER
Was the patient seen in the last year in this department? Yes LOV_  1/7/19  Does patient have an active prescription for medications requested? No     Received Request Via: Pharmacy- 90 DAY REQUEST

## 2019-03-05 ENCOUNTER — HOSPITAL ENCOUNTER (EMERGENCY)
Facility: MEDICAL CENTER | Age: 54
End: 2019-03-06
Attending: EMERGENCY MEDICINE
Payer: COMMERCIAL

## 2019-03-05 DIAGNOSIS — K08.89 TOOTHACHE: ICD-10-CM

## 2019-03-05 PROCEDURE — 700102 HCHG RX REV CODE 250 W/ 637 OVERRIDE(OP): Performed by: EMERGENCY MEDICINE

## 2019-03-05 PROCEDURE — 99284 EMERGENCY DEPT VISIT MOD MDM: CPT

## 2019-03-05 PROCEDURE — A9270 NON-COVERED ITEM OR SERVICE: HCPCS | Performed by: EMERGENCY MEDICINE

## 2019-03-05 RX ORDER — HYDROCODONE BITARTRATE AND ACETAMINOPHEN 5; 325 MG/1; MG/1
1 TABLET ORAL ONCE
Status: COMPLETED | OUTPATIENT
Start: 2019-03-05 | End: 2019-03-05

## 2019-03-05 RX ADMIN — HYDROCODONE BITARTRATE AND ACETAMINOPHEN 1 TABLET: 5; 325 TABLET ORAL at 23:23

## 2019-03-06 VITALS
BODY MASS INDEX: 41.03 KG/M2 | WEIGHT: 246.25 LBS | HEART RATE: 86 BPM | TEMPERATURE: 97 F | HEIGHT: 65 IN | RESPIRATION RATE: 18 BRPM | DIASTOLIC BLOOD PRESSURE: 98 MMHG | OXYGEN SATURATION: 93 % | SYSTOLIC BLOOD PRESSURE: 140 MMHG

## 2019-03-06 NOTE — ED PROVIDER NOTES
"ED Provider Note    CHIEF COMPLAINT  Toothe ache    [facility]  Vicky Nino is a 53 y.o. female who presents to the emergency department for the evaluation of a toothache. She states that she has been having a couple days of pain involving the right lower molars.  She states that she had a crown placed on 1 of the molars several years ago but denies any recent trauma.  She states that the pain has gotten worse and is now radiating to the angle of her mandible and temporal.  She denies any difficulty speaking, difficulty swallowing, pain with movement of her neck, or fevers.  She called her dentist office and was given a prescription for amoxicillin.  She is taking the first dose just prior to coming into the ED and has an appointment at the dentist office tomorrow morning.  She states that she has been taking ibuprofen at home for pain without relief.    REVIEW OF SYSTEMS  See HPI for further details. All other systems are negative.     PAST MEDICAL HISTORY   has a past medical history of Arrhythmia; Chest discomfort (11/18/2013); Depression; Hypertension; Obesity; Palpitations (11/18/2013); Psychiatric disorder; Snoring; and Ventricular bigeminy (11/18/2013).    SOCIAL HISTORY  Social History     Social History Main Topics   • Smoking status: Never Smoker   • Smokeless tobacco: Never Used   • Alcohol use 2.5 oz/week     5 Standard drinks or equivalent per week      Comment: Social    • Drug use: No   • Sexual activity: Not Currently     Partners: Male       SURGICAL HISTORY  patient denies any surgical history    CURRENT MEDICATIONS  Home Medications    **Home medications have not yet been reviewed for this encounter**         ALLERGIES  No Known Allergies    PHYSICAL EXAM  VITAL SIGNS: /98   Pulse 86   Temp 36 °C (96.8 °F) (Temporal)   Resp 18   Ht 1.651 m (5' 5\")   Wt 111.7 kg (246 lb 4.1 oz)   LMP 09/18/2013   SpO2 93%   BMI 40.98 kg/m²      Constitutional: Alert and in no apparent distress.  HENT: " Normocephalic atraumatic. Bilateral external ears normal. Nose normal. Mucous membranes are moist. There is tenderness to palpation of tooth number 30. No surrounding erythema, swelling, or drainage is noted.   Neck: Normal range of motion without tenderness. Supple. No submandibular swelling or erythema is noted.  Cardiovascular: Regular rate and rhythm. No murmurs, gallops or rubs.  Thorax & Lungs: Breath sounds are clear to auscultation bilaterally. No wheezing, rhonchi or rales.  Neurologic: Alert and oriented ×3.  No facial droop.  The patient moves all 4 extremities and follows commands.  Psychiatric: Affect is flat. Judgment appears to be intact.    COURSE & MEDICAL DECISION MAKING  Pertinent Labs & Imaging studies reviewed. (See chart for details)    This is a 53-year-old female presenting to the emergency department for evaluation of a toothache.  On initial evaluation, the patient did not appear to be in any acute distress and was sleeping on the gurney.  I did awaken her and she complained of pain at the right lower molars.  No evidence of abscess, facial asymmetry, or significant swelling or erythema was noted.  I have low clinical suspicion for Taj's angina, osteomyelitis, or facial abscess.  I did offer her a dental block, but she declined.  She instead agreed to a single dose of Norco here in the emergency department.  Upon reevaluation after the [place], the patient was mildly improved although she stated she was still having some pain.  She was again offered a dental block but declined.  She was encouraged to continue taking her amoxicillin and follow-up with the dentist office tomorrow.  She will return to the ED with any worsening signs or symptoms including but not limited to neck swelling, difficulty speaking or swallowing, or fever.    FINAL IMPRESSION  1. Toothache        PRESCRIPTIONS  New Prescriptions    No medications on file       FOLLOW UP  Please follow up with your dentist  tomorrow          Joy Conley M.D.  [address]  [place] [address]  [phone]    Go to   As needed    [facility], Emergency Dept  [address]  [place] [address]  [phone]  Go to   As needed, If symptoms worsen        -DISCHARGE-           Electronically signed by: Salma Jones, 3/5/2019 11:18 PM

## 2019-03-06 NOTE — ED NOTES
Pt cleared for d/c  Good pain relief after PO med  Son to drive pt home  dischg instructions given to pt  Verbally understands  D/c'ed to home in NAD  To f/u w/ dentist in am per pt

## 2019-03-06 NOTE — ED TRIAGE NOTES
Patient c/o dental pain since Sunday. Pain is made better by ibuprofen. She has an appointment tomorrow with Dental. Her dentist prescribed her amoxicillin

## 2019-03-08 ENCOUNTER — HOSPITAL ENCOUNTER (OUTPATIENT)
Dept: RADIOLOGY | Facility: MEDICAL CENTER | Age: 54
End: 2019-03-08
Attending: FAMILY MEDICINE
Payer: COMMERCIAL

## 2019-03-08 DIAGNOSIS — Z12.39 BREAST CANCER SCREENING: ICD-10-CM

## 2019-03-08 PROCEDURE — 76641 ULTRASOUND BREAST COMPLETE: CPT

## 2019-03-21 ENCOUNTER — OFFICE VISIT (OUTPATIENT)
Dept: MEDICAL GROUP | Facility: LAB | Age: 54
End: 2019-03-21
Payer: COMMERCIAL

## 2019-03-21 ENCOUNTER — HOSPITAL ENCOUNTER (OUTPATIENT)
Dept: RADIOLOGY | Facility: MEDICAL CENTER | Age: 54
End: 2019-03-21
Attending: NURSE PRACTITIONER
Payer: COMMERCIAL

## 2019-03-21 VITALS
HEIGHT: 64 IN | RESPIRATION RATE: 12 BRPM | TEMPERATURE: 97.9 F | SYSTOLIC BLOOD PRESSURE: 120 MMHG | HEART RATE: 80 BPM | BODY MASS INDEX: 40.97 KG/M2 | DIASTOLIC BLOOD PRESSURE: 80 MMHG | OXYGEN SATURATION: 95 % | WEIGHT: 240 LBS

## 2019-03-21 DIAGNOSIS — G89.29 CHRONIC BILATERAL LOW BACK PAIN WITHOUT SCIATICA: ICD-10-CM

## 2019-03-21 DIAGNOSIS — M54.50 CHRONIC BILATERAL LOW BACK PAIN WITHOUT SCIATICA: ICD-10-CM

## 2019-03-21 DIAGNOSIS — R20.2 PARESTHESIA OF RIGHT LOWER EXTREMITY: ICD-10-CM

## 2019-03-21 DIAGNOSIS — M79.604 PAIN OF RIGHT LOWER EXTREMITY: ICD-10-CM

## 2019-03-21 PROCEDURE — 73502 X-RAY EXAM HIP UNI 2-3 VIEWS: CPT | Mod: RT

## 2019-03-21 PROCEDURE — 99214 OFFICE O/P EST MOD 30 MIN: CPT | Performed by: NURSE PRACTITIONER

## 2019-03-21 PROCEDURE — 72100 X-RAY EXAM L-S SPINE 2/3 VWS: CPT

## 2019-03-21 PROCEDURE — 93971 EXTREMITY STUDY: CPT | Mod: RT
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
## 2019-04-18 ENCOUNTER — OFFICE VISIT (OUTPATIENT)
Dept: MEDICAL GROUP | Facility: LAB | Age: 54
End: 2019-04-18
Payer: COMMERCIAL

## 2019-04-18 VITALS
WEIGHT: 242.8 LBS | HEIGHT: 64 IN | TEMPERATURE: 97.5 F | SYSTOLIC BLOOD PRESSURE: 122 MMHG | HEART RATE: 83 BPM | DIASTOLIC BLOOD PRESSURE: 78 MMHG | BODY MASS INDEX: 41.45 KG/M2 | OXYGEN SATURATION: 96 %

## 2019-04-18 DIAGNOSIS — G89.29 CHRONIC PAIN OF RIGHT KNEE: ICD-10-CM

## 2019-04-18 DIAGNOSIS — M25.561 CHRONIC PAIN OF RIGHT KNEE: ICD-10-CM

## 2019-04-18 PROCEDURE — 99214 OFFICE O/P EST MOD 30 MIN: CPT | Performed by: FAMILY MEDICINE

## 2019-04-18 NOTE — PROGRESS NOTES
Subjective:     CC: Follow-up knee pain    HPI:   Vicky presents today with follow-up knee pain.  She states that she has had right lower knee pain when she moves sideways or certain ways.  She states it is painful walking up and down stairs.  She was seen approximately a month ago for this pain and had recently traveled on a plane.  She had Dopplers done which was negative for blood clots.  She had lumbar and hip with pelvis x-rays which showed arthritis.  At that time the pain had radiated.  She states that the knee pain has been consistent and that there was no bursa found.  She denies any radiculopathic pain.  She denies any loss of bowels or bladder or saddle anesthesia.  She is wondering if her pain is arthritic in nature.    Past Medical History:   Diagnosis Date   • Arrhythmia     rapid heart rate    • Chest discomfort 11/18/2013   • Depression    • Hypertension    • Obesity    • Palpitations 11/18/2013   • Psychiatric disorder     anxiety   • Snoring    • Ventricular bigeminy 11/18/2013       Social History   Substance Use Topics   • Smoking status: Never Smoker   • Smokeless tobacco: Never Used   • Alcohol use 2.5 oz/week     5 Standard drinks or equivalent per week      Comment: Social        Current Outpatient Prescriptions Ordered in [facility]   Medication Sig Dispense Refill   • amLODIPine (NORVASC) 2.5 MG Tab Take 1 Tab by mouth every day. 90 Tab 1   • acyclovir (ZOVIRAX) 800 MG Tab TAKE 1 TABLET ORALLY 3 TIMES A DAY (Patient taking differently: as needed. TAKE 1 TABLET ORALLY 3 TIMES A DAY) 30 Tab 1   • Cholecalciferol (VITAMIN D) 1000 UNIT CAPS Take 1 Cap by mouth every day.     • aspirin (ASA) 81 MG CHEW Take 81 mg by mouth every day.       No current [facility]-ordered facility-administered medications on file.        Allergies:  Patient has no known allergies.    ROS:  Gen: no fevers/chill, no changes in weight  Eyes: no changes in vision  ENT: no sore throat, no hearing loss, no bloody nose  Pulm: no sob,  "no cough  CV: no chest pain, no palpitations  GI: no nausea/vomiting, no diarrhea  : no dysuria  MSk: no myalgias, right knee pain  Skin: no rash  Neuro: no headaches, no numbness/tingling  Heme/Lymph: no easy bruising      Objective:       Exam:  /78 (BP Location: Left arm, Patient Position: Sitting)   Pulse 83   Temp 36.4 °C (97.5 °F) (Temporal)   Ht 1.626 m (5' 4\")   Wt 110.1 kg (242 lb 12.8 oz)   LMP 09/18/2013   SpO2 96%   BMI 41.68 kg/m²  Body mass index is 41.68 kg/m².    Gen: Alert and oriented, No apparent distress.  Neck: Neck is supple without lymphadenopathy.  Lungs: Normal effort, CTA bilaterally, no wheezes, rhonchi, or rales  CV: Regular rate and rhythm. No murmurs, rubs, or gallops.               Ext: No clubbing, cyanosis, edema.  Medial joint line tenderness of her right knee.    Assessment & Plan:     53 y.o. female with the following -     1. Chronic pain of right knee  Patient likely does have right knee arthritis.  Discussed supportive care and NSAID use.  Discussed future injections and synovial fluid injections.  Discussed surgical interventions as well.  We will follow-up x-ray.  She denies any trauma we will hold off on MRI at this time.  Patient will also start weight watchers in order to lose weight and help in handling arthritis.  Continue to monitor.  - DX-KNEE 3 VIEWS RIGHT; Future  - REFERRAL TO PHYSICAL THERAPY Reason for Therapy: Eval/Treat/Report        Please note that this dictation was created using voice recognition software. I have made every reasonable attempt to correct obvious errors, but I expect that there are errors of grammar and possibly content that I did not discover before finalizing the note.      "

## 2019-04-19 ENCOUNTER — HOSPITAL ENCOUNTER (OUTPATIENT)
Dept: RADIOLOGY | Facility: MEDICAL CENTER | Age: 54
End: 2019-04-19
Attending: FAMILY MEDICINE
Payer: COMMERCIAL

## 2019-04-19 DIAGNOSIS — G89.29 CHRONIC PAIN OF RIGHT KNEE: ICD-10-CM

## 2019-04-19 DIAGNOSIS — M25.561 CHRONIC PAIN OF RIGHT KNEE: ICD-10-CM

## 2019-04-19 PROCEDURE — 73562 X-RAY EXAM OF KNEE 3: CPT | Mod: RT

## 2019-04-30 DIAGNOSIS — M25.561 ACUTE PAIN OF RIGHT KNEE: ICD-10-CM

## 2019-05-01 ENCOUNTER — HOSPITAL ENCOUNTER (OUTPATIENT)
Dept: RADIOLOGY | Facility: MEDICAL CENTER | Age: 54
End: 2019-05-01
Attending: FAMILY MEDICINE
Payer: COMMERCIAL

## 2019-05-01 DIAGNOSIS — M25.561 ACUTE PAIN OF RIGHT KNEE: ICD-10-CM

## 2019-05-01 PROCEDURE — 73721 MRI JNT OF LWR EXTRE W/O DYE: CPT | Mod: RT

## 2019-05-03 DIAGNOSIS — S83.249A TEAR OF MEDIAL MENISCUS OF KNEE, CURRENT, UNSPECIFIED LATERALITY, UNSPECIFIED TEAR TYPE, INITIAL ENCOUNTER: ICD-10-CM

## 2019-05-14 ENCOUNTER — APPOINTMENT (OUTPATIENT)
Dept: PHYSICAL THERAPY | Facility: MEDICAL CENTER | Age: 54
End: 2019-05-14
Payer: COMMERCIAL

## 2019-05-29 DIAGNOSIS — Z01.810 PRE-OPERATIVE CARDIOVASCULAR EXAMINATION: ICD-10-CM

## 2019-05-29 DIAGNOSIS — Z01.812 PRE-OPERATIVE LABORATORY EXAMINATION: ICD-10-CM

## 2019-05-29 PROCEDURE — 36415 COLL VENOUS BLD VENIPUNCTURE: CPT

## 2019-05-29 PROCEDURE — 80048 BASIC METABOLIC PNL TOTAL CA: CPT

## 2019-05-29 PROCEDURE — 83036 HEMOGLOBIN GLYCOSYLATED A1C: CPT

## 2019-05-29 PROCEDURE — 93010 ELECTROCARDIOGRAM REPORT: CPT | Performed by: INTERNAL MEDICINE

## 2019-05-29 PROCEDURE — 85027 COMPLETE CBC AUTOMATED: CPT

## 2019-05-29 PROCEDURE — 93005 ELECTROCARDIOGRAM TRACING: CPT

## 2019-05-29 RX ORDER — IBUPROFEN 200 MG
400 TABLET ORAL
COMMUNITY
End: 2022-08-18

## 2019-05-29 NOTE — OR NURSING
Hx and meds reviewed, pre op instructions given.  Anesthesia fasting guidelines reviewed with pt. No problems with anesthesia with colonoscopy.

## 2019-05-30 LAB
ANION GAP SERPL CALC-SCNC: 11 MMOL/L (ref 0–11.9)
BUN SERPL-MCNC: 16 MG/DL (ref 8–22)
CALCIUM SERPL-MCNC: 9.9 MG/DL (ref 8.5–10.5)
CHLORIDE SERPL-SCNC: 107 MMOL/L (ref 96–112)
CO2 SERPL-SCNC: 22 MMOL/L (ref 20–33)
CREAT SERPL-MCNC: 0.68 MG/DL (ref 0.5–1.4)
EKG IMPRESSION: NORMAL
ERYTHROCYTE [DISTWIDTH] IN BLOOD BY AUTOMATED COUNT: 39.5 FL (ref 35.9–50)
EST. AVERAGE GLUCOSE BLD GHB EST-MCNC: 117 MG/DL
GLUCOSE SERPL-MCNC: 79 MG/DL (ref 65–99)
HBA1C MFR BLD: 5.7 % (ref 0–5.6)
HCT VFR BLD AUTO: 42.1 % (ref 37–47)
HGB BLD-MCNC: 13.5 G/DL (ref 12–16)
MCH RBC QN AUTO: 29.3 PG (ref 27–33)
MCHC RBC AUTO-ENTMCNC: 32.1 G/DL (ref 33.6–35)
MCV RBC AUTO: 91.5 FL (ref 81.4–97.8)
PLATELET # BLD AUTO: 300 K/UL (ref 164–446)
PMV BLD AUTO: 9.5 FL (ref 9–12.9)
POTASSIUM SERPL-SCNC: 4 MMOL/L (ref 3.6–5.5)
RBC # BLD AUTO: 4.6 M/UL (ref 4.2–5.4)
SODIUM SERPL-SCNC: 140 MMOL/L (ref 135–145)
WBC # BLD AUTO: 6.2 K/UL (ref 4.8–10.8)

## 2019-05-31 ENCOUNTER — ANESTHESIA EVENT (OUTPATIENT)
Dept: SURGERY | Facility: MEDICAL CENTER | Age: 54
End: 2019-05-31
Payer: COMMERCIAL

## 2019-06-03 ENCOUNTER — ANESTHESIA (OUTPATIENT)
Dept: SURGERY | Facility: MEDICAL CENTER | Age: 54
End: 2019-06-03
Payer: COMMERCIAL

## 2019-06-03 ENCOUNTER — HOSPITAL ENCOUNTER (OUTPATIENT)
Facility: MEDICAL CENTER | Age: 54
End: 2019-06-03
Attending: ORTHOPAEDIC SURGERY | Admitting: ORTHOPAEDIC SURGERY
Payer: COMMERCIAL

## 2019-06-03 VITALS
RESPIRATION RATE: 18 BRPM | TEMPERATURE: 97.2 F | OXYGEN SATURATION: 94 % | HEART RATE: 76 BPM | HEIGHT: 65 IN | DIASTOLIC BLOOD PRESSURE: 87 MMHG | WEIGHT: 240.52 LBS | BODY MASS INDEX: 40.07 KG/M2 | SYSTOLIC BLOOD PRESSURE: 134 MMHG

## 2019-06-03 PROCEDURE — 700105 HCHG RX REV CODE 258: Performed by: ORTHOPAEDIC SURGERY

## 2019-06-03 PROCEDURE — 160025 RECOVERY II MINUTES (STATS): Performed by: ORTHOPAEDIC SURGERY

## 2019-06-03 PROCEDURE — 700111 HCHG RX REV CODE 636 W/ 250 OVERRIDE (IP): Performed by: ANESTHESIOLOGY

## 2019-06-03 PROCEDURE — 160009 HCHG ANES TIME/MIN: Performed by: ORTHOPAEDIC SURGERY

## 2019-06-03 PROCEDURE — 160035 HCHG PACU - 1ST 60 MINS PHASE I: Performed by: ORTHOPAEDIC SURGERY

## 2019-06-03 PROCEDURE — 160029 HCHG SURGERY MINUTES - 1ST 30 MINS LEVEL 4: Performed by: ORTHOPAEDIC SURGERY

## 2019-06-03 PROCEDURE — 160036 HCHG PACU - EA ADDL 30 MINS PHASE I: Performed by: ORTHOPAEDIC SURGERY

## 2019-06-03 PROCEDURE — 160041 HCHG SURGERY MINUTES - EA ADDL 1 MIN LEVEL 4: Performed by: ORTHOPAEDIC SURGERY

## 2019-06-03 PROCEDURE — 700101 HCHG RX REV CODE 250: Performed by: ANESTHESIOLOGY

## 2019-06-03 PROCEDURE — 501838 HCHG SUTURE GENERAL: Performed by: ORTHOPAEDIC SURGERY

## 2019-06-03 PROCEDURE — 160048 HCHG OR STATISTICAL LEVEL 1-5: Performed by: ORTHOPAEDIC SURGERY

## 2019-06-03 PROCEDURE — A9270 NON-COVERED ITEM OR SERVICE: HCPCS | Performed by: ANESTHESIOLOGY

## 2019-06-03 PROCEDURE — 160046 HCHG PACU - 1ST 60 MINS PHASE II: Performed by: ORTHOPAEDIC SURGERY

## 2019-06-03 PROCEDURE — 502580 HCHG PACK, KNEE ARTHROSCOPY: Performed by: ORTHOPAEDIC SURGERY

## 2019-06-03 PROCEDURE — 700101 HCHG RX REV CODE 250: Performed by: ORTHOPAEDIC SURGERY

## 2019-06-03 PROCEDURE — 700102 HCHG RX REV CODE 250 W/ 637 OVERRIDE(OP): Performed by: ANESTHESIOLOGY

## 2019-06-03 PROCEDURE — A6222 GAUZE <=16 IN NO W/SAL W/O B: HCPCS | Performed by: ORTHOPAEDIC SURGERY

## 2019-06-03 PROCEDURE — 160002 HCHG RECOVERY MINUTES (STAT): Performed by: ORTHOPAEDIC SURGERY

## 2019-06-03 RX ORDER — BUPIVACAINE HYDROCHLORIDE 2.5 MG/ML
INJECTION, SOLUTION EPIDURAL; INFILTRATION; INTRACAUDAL
Status: DISCONTINUED | OUTPATIENT
Start: 2019-06-03 | End: 2019-06-03 | Stop reason: HOSPADM

## 2019-06-03 RX ORDER — GABAPENTIN 300 MG/1
300 CAPSULE ORAL
Status: COMPLETED | OUTPATIENT
Start: 2019-06-03 | End: 2019-06-03

## 2019-06-03 RX ORDER — HYDROMORPHONE HYDROCHLORIDE 1 MG/ML
0.2 INJECTION, SOLUTION INTRAMUSCULAR; INTRAVENOUS; SUBCUTANEOUS
Status: DISCONTINUED | OUTPATIENT
Start: 2019-06-03 | End: 2019-06-03 | Stop reason: HOSPADM

## 2019-06-03 RX ORDER — ACETAMINOPHEN 500 MG
1000 TABLET ORAL ONCE
Status: COMPLETED | OUTPATIENT
Start: 2019-06-03 | End: 2019-06-03

## 2019-06-03 RX ORDER — ONDANSETRON 2 MG/ML
4 INJECTION INTRAMUSCULAR; INTRAVENOUS
Status: DISCONTINUED | OUTPATIENT
Start: 2019-06-03 | End: 2019-06-03 | Stop reason: HOSPADM

## 2019-06-03 RX ORDER — DEXAMETHASONE SODIUM PHOSPHATE 4 MG/ML
INJECTION, SOLUTION INTRA-ARTICULAR; INTRALESIONAL; INTRAMUSCULAR; INTRAVENOUS; SOFT TISSUE PRN
Status: DISCONTINUED | OUTPATIENT
Start: 2019-06-03 | End: 2019-06-03 | Stop reason: SURG

## 2019-06-03 RX ORDER — LIDOCAINE HYDROCHLORIDE 10 MG/ML
INJECTION, SOLUTION INFILTRATION; PERINEURAL
Status: DISCONTINUED
Start: 2019-06-03 | End: 2019-06-03 | Stop reason: HOSPADM

## 2019-06-03 RX ORDER — HALOPERIDOL 5 MG/ML
1 INJECTION INTRAMUSCULAR
Status: DISCONTINUED | OUTPATIENT
Start: 2019-06-03 | End: 2019-06-03 | Stop reason: HOSPADM

## 2019-06-03 RX ORDER — OXYCODONE HCL 5 MG/5 ML
5 SOLUTION, ORAL ORAL
Status: COMPLETED | OUTPATIENT
Start: 2019-06-03 | End: 2019-06-03

## 2019-06-03 RX ORDER — SODIUM CHLORIDE, SODIUM LACTATE, POTASSIUM CHLORIDE, CALCIUM CHLORIDE 600; 310; 30; 20 MG/100ML; MG/100ML; MG/100ML; MG/100ML
INJECTION, SOLUTION INTRAVENOUS CONTINUOUS
Status: DISCONTINUED | OUTPATIENT
Start: 2019-06-03 | End: 2019-06-03 | Stop reason: HOSPADM

## 2019-06-03 RX ORDER — MEPERIDINE HYDROCHLORIDE 25 MG/ML
12.5 INJECTION INTRAMUSCULAR; INTRAVENOUS; SUBCUTANEOUS
Status: DISCONTINUED | OUTPATIENT
Start: 2019-06-03 | End: 2019-06-03 | Stop reason: HOSPADM

## 2019-06-03 RX ORDER — CEFAZOLIN SODIUM 1 G/3ML
INJECTION, POWDER, FOR SOLUTION INTRAMUSCULAR; INTRAVENOUS PRN
Status: DISCONTINUED | OUTPATIENT
Start: 2019-06-03 | End: 2019-06-03 | Stop reason: SURG

## 2019-06-03 RX ORDER — CELECOXIB 200 MG/1
400 CAPSULE ORAL ONCE
Status: COMPLETED | OUTPATIENT
Start: 2019-06-03 | End: 2019-06-03

## 2019-06-03 RX ORDER — OXYCODONE HCL 5 MG/5 ML
10 SOLUTION, ORAL ORAL
Status: COMPLETED | OUTPATIENT
Start: 2019-06-03 | End: 2019-06-03

## 2019-06-03 RX ORDER — ONDANSETRON 2 MG/ML
INJECTION INTRAMUSCULAR; INTRAVENOUS PRN
Status: DISCONTINUED | OUTPATIENT
Start: 2019-06-03 | End: 2019-06-03 | Stop reason: SURG

## 2019-06-03 RX ORDER — HYDRALAZINE HYDROCHLORIDE 20 MG/ML
5 INJECTION INTRAMUSCULAR; INTRAVENOUS
Status: DISCONTINUED | OUTPATIENT
Start: 2019-06-03 | End: 2019-06-03 | Stop reason: HOSPADM

## 2019-06-03 RX ORDER — HYDROMORPHONE HYDROCHLORIDE 1 MG/ML
0.1 INJECTION, SOLUTION INTRAMUSCULAR; INTRAVENOUS; SUBCUTANEOUS
Status: DISCONTINUED | OUTPATIENT
Start: 2019-06-03 | End: 2019-06-03 | Stop reason: HOSPADM

## 2019-06-03 RX ORDER — HYDROMORPHONE HYDROCHLORIDE 1 MG/ML
0.4 INJECTION, SOLUTION INTRAMUSCULAR; INTRAVENOUS; SUBCUTANEOUS
Status: DISCONTINUED | OUTPATIENT
Start: 2019-06-03 | End: 2019-06-03 | Stop reason: HOSPADM

## 2019-06-03 RX ORDER — DIPHENHYDRAMINE HYDROCHLORIDE 50 MG/ML
6.25 INJECTION INTRAMUSCULAR; INTRAVENOUS
Status: DISCONTINUED | OUTPATIENT
Start: 2019-06-03 | End: 2019-06-03 | Stop reason: HOSPADM

## 2019-06-03 RX ADMIN — OXYCODONE HYDROCHLORIDE 10 MG: 5 SOLUTION ORAL at 11:13

## 2019-06-03 RX ADMIN — PROPOFOL 200 MG: 10 INJECTION, EMULSION INTRAVENOUS at 10:04

## 2019-06-03 RX ADMIN — ONDANSETRON 4 MG: 2 INJECTION INTRAMUSCULAR; INTRAVENOUS at 10:40

## 2019-06-03 RX ADMIN — GABAPENTIN 300 MG: 300 CAPSULE ORAL at 09:00

## 2019-06-03 RX ADMIN — LIDOCAINE HYDROCHLORIDE 60 MG: 20 INJECTION, SOLUTION INFILTRATION; PERINEURAL at 10:04

## 2019-06-03 RX ADMIN — FENTANYL CITRATE 25 MCG: 50 INJECTION INTRAMUSCULAR; INTRAVENOUS at 11:10

## 2019-06-03 RX ADMIN — ACETAMINOPHEN 1000 MG: 500 TABLET, FILM COATED ORAL at 09:00

## 2019-06-03 RX ADMIN — MEPERIDINE HYDROCHLORIDE 12.5 MG: 25 INJECTION INTRAMUSCULAR; INTRAVENOUS; SUBCUTANEOUS at 11:40

## 2019-06-03 RX ADMIN — FENTANYL CITRATE 50 MCG: 50 INJECTION, SOLUTION INTRAMUSCULAR; INTRAVENOUS at 10:35

## 2019-06-03 RX ADMIN — FENTANYL CITRATE 50 MCG: 50 INJECTION, SOLUTION INTRAMUSCULAR; INTRAVENOUS at 10:04

## 2019-06-03 RX ADMIN — SODIUM CHLORIDE, POTASSIUM CHLORIDE, SODIUM LACTATE AND CALCIUM CHLORIDE: 600; 310; 30; 20 INJECTION, SOLUTION INTRAVENOUS at 09:11

## 2019-06-03 RX ADMIN — CEFAZOLIN 2 G: 1 INJECTION, POWDER, FOR SOLUTION INTRAVENOUS at 10:04

## 2019-06-03 RX ADMIN — FENTANYL CITRATE 50 MCG: 50 INJECTION, SOLUTION INTRAMUSCULAR; INTRAVENOUS at 10:20

## 2019-06-03 RX ADMIN — SODIUM CHLORIDE, POTASSIUM CHLORIDE, SODIUM LACTATE AND CALCIUM CHLORIDE: 600; 310; 30; 20 INJECTION, SOLUTION INTRAVENOUS at 10:00

## 2019-06-03 RX ADMIN — DEXAMETHASONE SODIUM PHOSPHATE 8 MG: 4 INJECTION, SOLUTION INTRAMUSCULAR; INTRAVENOUS at 10:06

## 2019-06-03 RX ADMIN — MEPERIDINE HYDROCHLORIDE 12.5 MG: 25 INJECTION INTRAMUSCULAR; INTRAVENOUS; SUBCUTANEOUS at 11:25

## 2019-06-03 RX ADMIN — CELECOXIB 400 MG: 200 CAPSULE ORAL at 09:00

## 2019-06-03 RX ADMIN — LIDOCAINE HYDROCHLORIDE 0.5 ML: 10 INJECTION, SOLUTION INFILTRATION; PERINEURAL at 09:09

## 2019-06-03 RX ADMIN — FENTANYL CITRATE 25 MCG: 50 INJECTION INTRAMUSCULAR; INTRAVENOUS at 11:20

## 2019-06-03 NOTE — OP REPORT
DATE OF SERVICE:  06/03/2019    PREOPERATIVE DIAGNOSIS:  Right knee medial meniscal tear.    POSTOPERATIVE DIAGNOSES:  1.  Right knee medial meniscal tear.  2.  Stable lateral meniscal tear.  3.  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    PROCEDURES:  1.  Right knee arthroscopy with partial medial and lateral meniscectomies.  2.  Chondroplasty of patella and trochlea.    SURGEON:  Jag Gomez MD    ANESTHESIA:  General.    COMPLICATIONS:  None.    BLOOD LOSS:  Minimal.    TOURNIQUET:  None.    INDICATION:  Patient is a 54-year-old woman who sustained a right knee injury   nearly 6 weeks ago.  She has had persistent medial pain and mechanical   symptoms and an MRI showing a radial tear of the medial meniscus.  She is   indicated for arthroscopic management.    FINDINGS:  Exam under anesthesia revealed some generalized hyperlaxity of the   right knee.  Lachman's was symmetric with the opposite side.  Arthroscopic   examination of the suprapatellar pouch and medial and lateral gutters was   unremarkable.  Examination of the patella revealed diffuse grade II to III   chondrosis.  Examination of the trochlea revealed a large area of central   grade III to IV chondrosis.  Examination of the notch revealed the cruciate   ligaments to be intact.  Examination of the lateral compartment revealed a   stable partial tear of the posterior horn of the lateral meniscus just medial   to the popliteal hiatus which was stable to probing.  There was shallow   tearing on both the superior and inferior surfaces.  Articular surfaces were   intact.  Examination of the medial compartment revealed some grade II   chondrosis of the medial tibial plateau.  There was a radial tear of the   posterior horn of the medial meniscus adjacent to the root attachment which   extended to the meniscal synovial junction with an adjacent unstable flap.      DESCRIPTION OF PROCEDURE:  Following induction of general anesthesia, time-out   was  performed confirming patient, site, and procedure.  Two grams of Ancef IV   were ordered and administered.  The right thigh tourniquet and thigh ramírez   were applied and the left leg was placed in a well leg ramírez.  Under sterile   conditions, the right knee was injected with 30 mL of 0.25% plain Marcaine in   the standard fashion.  The right lower extremity was prepped and draped in the   usual fashion.  Standard anterolateral and anteromedial portals with   supralateral outflow were established and diagnostic arthroscopy was performed   with findings as above.  The shaver was used to debride the superior and   inferior surface of the lateral meniscus at the tear site and to debride the   frayed free edge.  She remained stable to probing.  Next, a shaver and basket   were used to resect the unstable flap and to contour the meniscus adjacent to   the tear site.  The tear site was debrided and care was taken to check for   stability of the remaining meniscus.      Next, the shaver was used to debride fibrillated articular cartilage in the   patellofemoral joint.  The arthroscope was withdrawn.  The portals were closed   with nylon sutures.  A 30 mL of 0.25% plain Marcaine was injected into the   joint.  Sterile dressing was applied followed by VARSHA hose and knee   immobilizer.  The patient was awakened and extubated in the operating room and   transferred to recovery room in stable condition.       ____________________________________     MD CHERYLE Rick / ROXANNE    DD:  06/03/2019 10:54:28  DT:  06/03/2019 11:06:04    D#:  [id number]  Job#:  [id number]

## 2019-06-03 NOTE — ANESTHESIA TIME REPORT
Anesthesia Start and Stop Event Times     Date Time Event    6/3/2019 1000 Anesthesia Start     1054 Anesthesia Stop        Responsible Staff  06/03/19    Name Role Begin End    Anam Latham M.D. Anesth 1000 1054        Preop Diagnosis (Free Text):  Pre-op Diagnosis     CURRENT TEAR OF MEDIAL CARTILAGE AND OR MENISCUS OF KNEE-RIGHT, OTHER TEAR OF MEDIAL MENISCUS, CURRENT INJURY, RIGHT KNEE, INITIAL ENCOUNTER        Preop Diagnosis (Codes):  Diagnosis Information     Diagnosis Code(s):         Post op Diagnosis  Acute medial meniscus tear of right knee      Premium Reason  Non-Premium    Comments:

## 2019-06-03 NOTE — ANESTHESIA PREPROCEDURE EVALUATION
Past Medical History:   Diagnosis Date   • Arrhythmia     rapid heart rate    • Asthma     has not used inhaler in a year   • Chest discomfort 11/18/2013   • Depression    • High cholesterol    • History of anemia    • Hypertension    • Obesity    • Palpitations 11/18/2013   • Psychiatric disorder     anxiety   • Snoring    • Ventricular bigeminy 11/18/2013         Relevant Problems   (+) Asthma in adult   (+) Essential hypertension   (+) History of cold sores       Physical Exam    Airway   Mallampati: II  TM distance: >3 FB  Neck ROM: full       Cardiovascular - normal exam  Rhythm: regular  Rate: normal  (-) murmur     Dental - normal exam         Pulmonary - normal exam  Breath sounds clear to auscultation     Abdominal    Neurological - normal exam                 Anesthesia Plan    ASA 3   ASA physical status 3 criteria: morbid obesity - BMI greater than or equal to 40    Plan - general       Airway plan will be LMA        Induction: intravenous    Postoperative Plan: Postoperative administration of opioids is intended.    Pertinent diagnostic labs and testing reviewed    Informed Consent:    Anesthetic plan and risks discussed with patient.

## 2019-06-03 NOTE — OR NURSING
1204: Pt brought to PACU Stage 2 by CNA, and helped her dress and get into recliner, tolerated it well. Pain is tolerable, no nausea, awake and alert, dressing is CDI.   1252: D/Du to care of family post uneventful stay in PACU 2.

## 2019-06-03 NOTE — DISCHARGE INSTRUCTIONS
ACTIVITY: Rest and take it easy for the first 24 hours.  A responsible adult is recommended to remain with you during that time.  It is normal to feel sleepy.  We encourage you to not do anything that requires balance, judgment or coordination.    MILD FLU-LIKE SYMPTOMS ARE NORMAL. YOU MAY EXPERIENCE GENERALIZED MUSCLE ACHES, THROAT IRRITATION, HEADACHE AND/OR SOME NAUSEA.    FOR 24 HOURS DO NOT:  Drive, operate machinery or run household appliances.  Drink beer or alcoholic beverages.   Make important decisions or sign legal documents.     SPECIAL INSTRUCTIONS: Weight bearing as tolerated  With brace and crutches as needed    DIET: To avoid nausea, slowly advance diet as tolerated, avoiding spicy or greasy foods for the first day.  Add more substantial food to your diet according to your physician's instructions.  Babies can be fed formula or breast milk as soon as they are hungry.  INCREASE FLUIDS AND FIBER TO AVOID CONSTIPATION.    SURGICAL DRESSING/BATHING: Keep clean dry and intact until Physical Therapy appointment    FOLLOW-UP APPOINTMENT:  A follow-up appointment as scheduled    You should CALL YOUR PHYSICIAN if you develop:  Fever greater than 101 degrees F.  Pain not relieved by medication, or persistent nausea or vomiting.  Excessive bleeding (blood soaking through dressing) or unexpected drainage from the wound.  Extreme redness or swelling around the incision site, drainage of pus or foul smelling drainage.  Inability to urinate or empty your bladder within 8 hours.  Problems with breathing or chest pain.    You should call 911 if you develop problems with breathing or chest pain.  If you are unable to contact your doctor or surgical center, you should go to the nearest emergency room or urgent care center.  Physician's telephone #: [phone]    If any questions arise, call your doctor.  If your doctor is not available, please feel free to call the Surgical Center at [phone].  The Center is open  Monday through Friday from 7AM to 7PM.  You can also call the HEALTH HOTLINE open 24 hours/day, 7 days/week and speak to a nurse at [phone], or toll free at [phone].    A registered nurse may call you a few days after your surgery to see how you are doing after your procedure.    MEDICATIONS: Resume taking daily medication.  Take prescribed pain medication with food.  If no medication is prescribed, you may take non-aspirin pain medication if needed.  PAIN MEDICATION CAN BE VERY CONSTIPATING.  Take a stool softener or laxative such as senokot, pericolace, or milk of magnesia if needed.    Prescription at home.  Last pain medication given at 1130    If your physician has prescribed pain medication that includes Acetaminophen (Tylenol), do not take additional Acetaminophen (Tylenol) while taking the prescribed medication.    Depression / Suicide Risk    As you are discharged from this [facility] facility, it is important to learn how to keep safe from harming yourself.    Recognize the warning signs:  · Abrupt changes in personality, positive or negative- including increase in energy   · Giving away possessions  · Change in eating patterns- significant weight changes-  positive or negative  · Change in sleeping patterns- unable to sleep or sleeping all the time   · Unwillingness or inability to communicate  · Depression  · Unusual sadness, discouragement and loneliness  · Talk of wanting to die  · Neglect of personal appearance   · Rebelliousness- reckless behavior  · Withdrawal from people/activities they love  · Confusion- inability to concentrate     If you or a loved one observes any of these behaviors or has concerns about self-harm, here's what you can do:  · Talk about it- your feelings and reasons for harming yourself  · Remove any means that you might use to hurt yourself (examples: pills, rope, extension cords, firearm)  · Get professional help from the community (Mental Health, Substance  Abuse, psychological counseling)  · Do not be alone:Call your Safe Contact- someone whom you trust who will be there for you.  · Call your local CRISIS HOTLINE 454-1978 or 589-290-1519  · Call your local Children's Mobile Crisis Response Team [place] (074) 191-6531 or www.[employer]  · Call the toll free National Suicide Prevention Hotlines   · National Suicide Prevention Lifeline 123-240-FSZJ (4607)  · National Hope Line Network 800-SUICIDE (664-4255)

## 2019-06-03 NOTE — ANESTHESIA POSTPROCEDURE EVALUATION
Patient: Vicky Nino    Procedure Summary     Date:  06/03/19 Room / Location:   OR 04 / SURGERY [facility]    Anesthesia Start:  1000 Anesthesia Stop:  1054    Procedures:       ARTHROSCOPY, KNEE (Right Knee)      MENISCECTOMY, KNEE, MEDIAL, LATERAL-  PARTIAL (Right Knee)      CHONDROPLASTY- patella (Right Knee) Diagnosis:  (CURRENT TEAR OF MEDIAL CARTILAGE AND OR MENISCUS OF KNEE-RIGHT, OTHER TEAR OF MEDIAL MENISCUS, CURRENT INJURY, RIGHT KNEE, INITIAL ENCOUNTER)    Surgeon:  Jag Gomez M.D. Responsible Provider:  Anam Latham M.D.    Anesthesia Type:  general ASA Status:  3          Final Anesthesia Type: general  Last vitals  BP   Blood Pressure: 123/79    Temp   36.6 °C (97.9 °F)    Pulse   Pulse: 83   Resp   16    SpO2   98 %      Anesthesia Post Evaluation    Patient location during evaluation: PACU  Patient participation: complete - patient participated  Level of consciousness: awake and alert    Airway patency: patent  Anesthetic complications: no  Cardiovascular status: hemodynamically stable  Respiratory status: acceptable  Hydration status: euvolemic    PONV: none           Nurse Pain Score: 0 (NPRS)

## 2019-06-03 NOTE — OR SURGEON
Immediate Post OP Note    PreOp Diagnosis: R MMT    PostOp Diagnosis: R MMT/LMT    Procedure(s):  ARTHROSCOPY, KNEE - Wound Class: Clean  MENISCECTOMY, KNEE, MEDIAL, LATERAL-  PARTIAL - Wound Class: Clean  CHONDROPLASTY- patella - Wound Class: Clean    Surgeon(s):  Jag Gomez M.D.    Anesthesiologist/Type of Anesthesia:  Anesthesiologist: Anam Latham M.D./General    Surgical Staff:  Circulator: Sanjay Mendez R.N.; Korin Dasilva R.N.  Scrub Person: Justen Rojas R.N.  First Assist: Renaldo Vincent    Specimens removed if any:  * No specimens in log *    Estimated Blood Loss: min    Findings: above    Complications: none        6/3/2019 10:57 AM Jag Gomez M.D.

## 2019-06-03 NOTE — OR NURSING
1053 received from or  resp spont  lma dc'd by anesthesia  r knee dressing c/d/i  Dp2+  Foot warm  Brace intact  Ice pack applied   Medicated for pain [id number]  Meets discharge criteria

## 2019-06-03 NOTE — ANESTHESIA PROCEDURE NOTES
Airway  Date/Time: 6/3/2019 10:04 AM  Performed by: MELANY PICKENS  Authorized by: MELANY PICKENS     Location:  OR  Urgency:  Elective  Difficult Airway: No    Indications for Airway Management:  Anesthesia  Spontaneous Ventilation: absent    Sedation Level:  Deep  Preoxygenated: Yes    Mask Difficulty Assessment:  1 - vent by mask  Final Airway Type:  Supraglottic airway  Final Supraglottic Airway:  Standard LMA  SGA Size:  3  Number of Attempts at Approach:  1

## 2019-06-03 NOTE — OR NURSING
Patient to preop, allergies and NPO status verified, home medications reconciled, belongings secured, verbalizes understanding of pain scale, surgical site verified, IV access established, SCDs/ VARSHA hose in place, triple aim completed.

## 2019-06-03 NOTE — ANESTHESIA QCDR
2019 [facility] Clinical Data Registry (for Quality Improvement)     Postoperative nausea/vomiting risk protocol (Adult = 18 yrs and Pediatric 3-17 yrs)- (430 and 463)  General inhalation anesthetic (NOT TIVA) with PONV risk factors: Yes  Provision of anti-emetic therapy with at least 2 different classes of agents: Yes   Patient DID NOT receive anti-emetic therapy and reason is documented in Medical Record:  N/A    Multimodal Pain Management- (AQI59)  Patient undergoing Elective Surgery (i.e. Outpatient, or ASC, or Prescheduled Surgery prior to Hospital Admission): Yes  Use of Multimodal Pain Management, two or more drugs and/or interventions, NOT including systemic opioids: Yes   Exception: Documented allergy to multiple classes of analgesics:  N/A    PACU assessment of acute postoperative pain prior to Anesthesia Care End- Applies to Patients Age = 18- (ABG7)  Initial PACU pain score is which of the following: < 7/10  Patient unable to report pain score: N/A    Post-anesthetic transfer of care checklist/protocol to PACU/ICU- (426 and 427)  Upon conclusion of case, patient transferred to which of the following locations: PACU/Non-ICU  Use of transfer checklist/protocol: Yes  Exclusion: Service Performed in Patient Hospital Room (and thus did not require transfer): N/A    PACU Reintubation- (AQI31)  General anesthesia requiring endotracheal intubation (ETT) along with subsequent extubation in OR or PACU: No  Required reintubation in the PACU: N/A  Extubation was a planned trial documented in the medical record prior to removal of the original airway device: N/A    Unplanned admission to ICU related to anesthesia service up through end of PACU care- (MD51)  Unplanned admission to ICU (not initially anticipated at anesthesia start time): No

## 2019-07-26 ENCOUNTER — OFFICE VISIT (OUTPATIENT)
Dept: URGENT CARE | Facility: MEDICAL CENTER | Age: 54
End: 2019-07-26
Payer: COMMERCIAL

## 2019-07-26 VITALS
OXYGEN SATURATION: 97 % | TEMPERATURE: 98.5 F | SYSTOLIC BLOOD PRESSURE: 120 MMHG | RESPIRATION RATE: 16 BRPM | BODY MASS INDEX: 39.99 KG/M2 | HEIGHT: 65 IN | WEIGHT: 240 LBS | HEART RATE: 88 BPM | DIASTOLIC BLOOD PRESSURE: 76 MMHG

## 2019-07-26 DIAGNOSIS — B00.9 HSV INFECTION: ICD-10-CM

## 2019-07-26 DIAGNOSIS — B02.9 HERPES ZOSTER WITHOUT COMPLICATION: ICD-10-CM

## 2019-07-26 PROCEDURE — 99214 OFFICE O/P EST MOD 30 MIN: CPT | Performed by: NURSE PRACTITIONER

## 2019-07-26 RX ORDER — VALACYCLOVIR HYDROCHLORIDE 1 G/1
1000 TABLET, FILM COATED ORAL 3 TIMES DAILY
Qty: 30 TAB | Refills: 2 | Status: SHIPPED | OUTPATIENT
Start: 2019-07-26 | End: 2019-08-02

## 2019-07-26 ASSESSMENT — ENCOUNTER SYMPTOMS
FEVER: 0
CHILLS: 0

## 2019-07-26 NOTE — PROGRESS NOTES
"Subjective:      Vicky Nino is a 54 y.o. female who presents with Rash (Red, spotty, started 2 days ago on back of neck/right side, spread down to arm. ) and Tachycardia (Pt c/o \"racing heart\")    Past Medical History:   Diagnosis Date   • Arrhythmia     rapid heart rate    • Asthma     has not used inhaler in a year   • Chest discomfort 11/18/2013   • Depression    • High cholesterol    • History of anemia    • Hypertension    • Obesity    • Palpitations 11/18/2013   • Psychiatric disorder     anxiety   • Snoring    • Ventricular bigeminy 11/18/2013     Social History     Social History   • Marital status:      Spouse name: N/A   • Number of children: N/A   • Years of education: N/A     Occupational History   • Not on file.     Social History Main Topics   • Smoking status: Never Smoker   • Smokeless tobacco: Never Used   • Alcohol use 0.0 - 8.4 oz/week      Comment: Social    • Drug use: No   • Sexual activity: Not Currently     Partners: Male     Other Topics Concern   • Not on file     Social History Narrative   • No narrative on file     Family History   Problem Relation Age of Onset   • Diabetes Mother    • Genitourinary () Mother         renal failure, kidey transplant   • Hypertension Mother    • Thyroid Sister         2 with removal due to Graves   • Hypertension Brother    • Cancer Brother 60        colon cancer    • Cancer Paternal Grandmother         colon   • Cancer Paternal Grandfather         lung   • Cancer Maternal Aunt    • Hypertension Father    • Cancer Maternal Grandmother         breast   • Heart Disease Neg Hx        Allergies: Patient has no known allergies.      Patient is a 54-year-old female who presents today with concern for possible shingles.  Over the last 2 days, she has noted a vesicular lesion to the right upper arm right side of her neck.  Patient states these are more pruritic than painful.  States she is also felt generally unwell.  No fever or body aches.  States " "she has felt that her heart has been racing.  No abnormal heart rate is noted at this time.    Secondly, patient is concerned about a small lesion that she has noted develop under the left breast.          Other   This is a new problem. The current episode started in the past 7 days. The problem occurs constantly. The problem has been unchanged. Associated symptoms include a rash. Pertinent negatives include no chills or fever. Nothing aggravates the symptoms. She has tried nothing for the symptoms. The treatment provided no relief.       Review of Systems   Constitutional: Positive for malaise/fatigue. Negative for chills and fever.   Skin: Positive for rash.        Skin lesion under the left breast   All other systems reviewed and are negative.         Objective:     /76   Pulse 88   Temp 36.9 °C (98.5 °F) (Temporal)   Resp 16   Ht 1.651 m (5' 5\")   Wt 108.9 kg (240 lb)   LMP 09/18/2013   SpO2 97%   BMI 39.94 kg/m²      Physical Exam   Constitutional: She appears well-developed and well-nourished.   Cardiovascular: Normal rate and regular rhythm.    Pulmonary/Chest: Effort normal and breath sounds normal.   Skin: Skin is warm and dry. Rash noted.   Few vesicular lesions noted to the right upper arm and right side of the neck.  These areas are non-tender.    Patient has what appears to be an actinic keratosis, 1 cm in size under the left breast.   Psychiatric: She has a normal mood and affect. Her behavior is normal. Judgment and thought content normal.   Vitals reviewed.              Assessment/Plan:     1.  Herpes zoster without complication  - valacyclovir (VALTREX) 1 GM Tab; Take 1 Tab by mouth 3 times a day for 7 days.  Dispense: 30 Tab; Refill: 2    2.  Skin lesion    -Monitor  -Follow-up with dermatologist for any changes.      "

## 2019-08-20 DIAGNOSIS — I10 ESSENTIAL HYPERTENSION: ICD-10-CM

## 2019-08-20 RX ORDER — AMLODIPINE BESYLATE 2.5 MG/1
TABLET ORAL
Qty: 90 TAB | Refills: 1 | Status: SHIPPED | OUTPATIENT
Start: 2019-08-20 | End: 2020-03-16

## 2019-08-20 NOTE — TELEPHONE ENCOUNTER
Was the patient seen in the last year in this department? Yes lov 4/18/19    Does patient have an active prescription for medications requested? No     Received Request Via: Pharmacy

## 2019-09-17 NOTE — PROCEDURE: SUNSCREEN RECOMMENDATIONS
Left vm to inform abi that pt  On 9/30 does not need a referral pt does needs labs prior to appt as well          ----- Message from Chanel Mattson sent at 9/17/2019  8:20 AM CDT -----  Contact: [facility]  [place]   [facility]       Abi Mendez   Ph [phone]      Returning your call   Thanks     
General Sunscreen Counseling: Reviewed sun protection including SPF 30 or higher, reapplication every 2 hours while in direct sunlight, sunglasses, wide brim hats, UPF clothing.
Detail Level: Zone

## 2019-10-10 ENCOUNTER — OFFICE VISIT (OUTPATIENT)
Dept: MEDICAL GROUP | Facility: LAB | Age: 54
End: 2019-10-10
Payer: COMMERCIAL

## 2019-10-10 VITALS
SYSTOLIC BLOOD PRESSURE: 118 MMHG | WEIGHT: 241 LBS | DIASTOLIC BLOOD PRESSURE: 68 MMHG | HEART RATE: 83 BPM | HEIGHT: 65 IN | OXYGEN SATURATION: 96 % | BODY MASS INDEX: 40.15 KG/M2 | TEMPERATURE: 97.5 F

## 2019-10-10 DIAGNOSIS — M89.319 CLAVICLE ENLARGEMENT: ICD-10-CM

## 2019-10-10 DIAGNOSIS — J45.909 ASTHMA IN ADULT WITHOUT COMPLICATION, UNSPECIFIED ASTHMA SEVERITY, UNSPECIFIED WHETHER PERSISTENT: ICD-10-CM

## 2019-10-10 DIAGNOSIS — Z12.39 BREAST CANCER SCREENING: ICD-10-CM

## 2019-10-10 DIAGNOSIS — Z23 NEED FOR VACCINATION: ICD-10-CM

## 2019-10-10 PROCEDURE — 90471 IMMUNIZATION ADMIN: CPT | Performed by: FAMILY MEDICINE

## 2019-10-10 PROCEDURE — 99214 OFFICE O/P EST MOD 30 MIN: CPT | Mod: 25 | Performed by: FAMILY MEDICINE

## 2019-10-10 PROCEDURE — 90732 PPSV23 VACC 2 YRS+ SUBQ/IM: CPT | Performed by: FAMILY MEDICINE

## 2019-10-10 PROCEDURE — 90472 IMMUNIZATION ADMIN EACH ADD: CPT | Performed by: FAMILY MEDICINE

## 2019-10-10 PROCEDURE — 90686 IIV4 VACC NO PRSV 0.5 ML IM: CPT | Performed by: FAMILY MEDICINE

## 2019-10-10 RX ORDER — ALBUTEROL SULFATE 90 UG/1
2 AEROSOL, METERED RESPIRATORY (INHALATION) EVERY 6 HOURS PRN
Qty: 8.5 G | Refills: 1 | Status: SHIPPED | OUTPATIENT
Start: 2019-10-10 | End: 2020-04-06

## 2019-10-10 NOTE — PROGRESS NOTES
Subjective:     CC: Shortness of breath    HPI:   Vicky presents today with    Shortness of breath:  This is a chronic and stable issue.  Patient has a history of mild intermittent asthma however recently she is found in the last several months she has had several episodes of shortness of breath throughout the day.  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  She sees an allergist and she is being treated for her allergy symptoms however those have not really worked and she is complaining more of the shortness of breath aspect.  She also has some exertional asthma she experience is with exercise.  She does have a cough as well.    Clavicle enlargement:  Patient has had a left soft mass over her left proximal clavicle.  Is been there for several years but she states she is now getting concerned as it is noticeable to others.  It is painless and has not changed in size and it is soft and mobile.    Past Medical History:   Diagnosis Date   • Arrhythmia     rapid heart rate    • Asthma     has not used inhaler in a year   • Chest discomfort 11/18/2013   • Depression    • High cholesterol    • History of anemia    • Hypertension    • Obesity    • Palpitations 11/18/2013   • Psychiatric disorder     anxiety   • Snoring    • Ventricular bigeminy 11/18/2013       Social History     Tobacco Use   • Smoking status: Never Smoker   • Smokeless tobacco: Never Used   Substance Use Topics   • Alcohol use: Yes     Alcohol/week: 0.0 - 8.4 oz     Comment: Social    • Drug use: No       Current Outpatient Medications Ordered in Epic   Medication Sig Dispense Refill   • albuterol 108 (90 Base) MCG/ACT Aero Soln inhalation aerosol Inhale 2 Puffs by mouth every 6 hours as needed for Shortness of Breath. 8.5 g 1   • amLODIPine (NORVASC) 2.5 MG Tab TAKE 1 TABLET BY MOUTH EVERY DAY 90 Tab 1   • ibuprofen (MOTRIN) 200 MG Tab Take 400 mg by mouth  "every bedtime.     • multivitamin (THERAGRAN) Tab Take 1 Tab by mouth every day.     • COLLAGEN PO Take  by mouth every day. 1/2 scoop     • Cholecalciferol (VITAMIN D) 1000 UNIT CAPS Take 1 Cap by mouth every day.     • aspirin (ASA) 81 MG CHEW Take 81 mg by mouth every day.     • acyclovir (ZOVIRAX) 800 MG Tab TAKE 1 TABLET ORALLY 3 TIMES A DAY (Patient taking differently: Take 800 mg by mouth as needed. TAKE 1 TABLET ORALLY 3 TIMES A DAY) 30 Tab 1     No current Epic-ordered facility-administered medications on file.        Allergies:  Patient has no known allergies.    ROS:  Gen: no fevers/chill, no changes in weight  Eyes: no changes in vision  ENT: no sore throat, no hearing loss, no bloody nose  Pulm: +sob, +cough  CV: no chest pain, no palpitations  GI: no nausea/vomiting, no diarrhea  : no dysuria  MSk: no myalgias  Skin: no rash  Neuro: no headaches, no numbness/tingling  Heme/Lymph: no easy bruising      Objective:       Exam:  /68 (BP Location: Left arm, Patient Position: Sitting)   Pulse 83   Temp 36.4 °C (97.5 °F) (Temporal)   Ht 1.651 m (5' 5\")   Wt 109.3 kg (241 lb)   LMP 09/18/2013   SpO2 96%   BMI 40.10 kg/m²  Body mass index is 40.1 kg/m².    Gen: Alert and oriented, No apparent distress.  Neck: Neck is supple without lymphadenopathy.  Lungs: Normal effort, CTA bilaterally, no wheezes, positive rhonchi  CV: Regular rate and rhythm. No murmurs, rubs, or gallops.               Ext: No clubbing, cyanosis, edema.  Soft tissue and circumscribed mass over clavicle      Assessment & Plan:     54 y.o. female with the following -     1. Need for vaccination  Administered today  - Influenza Vaccine Quad Injection (PF)  - Pneumovax Vaccine (PPSV23)    2. Breast cancer screening  Ordered today  - [id number]; Future    3. Asthma in adult without complication, unspecified asthma severity, unspecified whether persistent  Rhonchi on exam.  She is significantly short of breath also in " the office however O2 sats normal.  I do not believe she is having exacerbation this is just uncontrolled asthma as she is not taking her albuterol.  Counseled on albuterol use and method.  We will follow-up in 6 weeks if this does not help with her shortness of breath we may need to up regimen at this time  - HEMOGLOBIN A1C; Future  - CBC WITH DIFFERENTIAL; Future  - Comp Metabolic Panel; Future  - Lipid Profile; Future  - TSH WITH REFLEX TO FT4; Future    4. Clavicle enlargement  Likely non-concerning soft tissue or possibly overlying lipoma.  Would like ultrasound for peace of mind.  - US-SOFT TISSUES OF HEAD - NECK; Future        Please note that this dictation was created using voice recognition software. I have made every reasonable attempt to correct obvious errors, but I expect that there are errors of grammar and possibly content that I did not discover before finalizing the note.

## 2019-10-11 ENCOUNTER — HOSPITAL ENCOUNTER (OUTPATIENT)
Dept: RADIOLOGY | Facility: MEDICAL CENTER | Age: 54
End: 2019-10-11
Attending: FAMILY MEDICINE
Payer: COMMERCIAL

## 2019-10-11 DIAGNOSIS — Z12.31 VISIT FOR SCREENING MAMMOGRAM: ICD-10-CM

## 2019-10-11 PROCEDURE — 77063 BREAST TOMOSYNTHESIS BI: CPT

## 2019-10-23 ENCOUNTER — HOSPITAL ENCOUNTER (OUTPATIENT)
Dept: RADIOLOGY | Facility: MEDICAL CENTER | Age: 54
End: 2019-10-23
Attending: FAMILY MEDICINE
Payer: COMMERCIAL

## 2019-10-23 DIAGNOSIS — M89.319 CLAVICLE ENLARGEMENT: ICD-10-CM

## 2019-10-23 PROCEDURE — 76536 US EXAM OF HEAD AND NECK: CPT

## 2019-11-05 ENCOUNTER — APPOINTMENT (RX ONLY)
Dept: URBAN - METROPOLITAN AREA CLINIC 20 | Facility: CLINIC | Age: 54
Setting detail: DERMATOLOGY
End: 2019-11-05

## 2019-11-05 DIAGNOSIS — Z71.89 OTHER SPECIFIED COUNSELING: ICD-10-CM

## 2019-11-05 DIAGNOSIS — L82.1 OTHER SEBORRHEIC KERATOSIS: ICD-10-CM

## 2019-11-05 DIAGNOSIS — L81.4 OTHER MELANIN HYPERPIGMENTATION: ICD-10-CM

## 2019-11-05 DIAGNOSIS — D18.0 HEMANGIOMA: ICD-10-CM

## 2019-11-05 DIAGNOSIS — D22 MELANOCYTIC NEVI: ICD-10-CM

## 2019-11-05 PROBLEM — D18.01 HEMANGIOMA OF SKIN AND SUBCUTANEOUS TISSUE: Status: ACTIVE | Noted: 2019-11-05

## 2019-11-05 PROBLEM — D22.62 MELANOCYTIC NEVI OF LEFT UPPER LIMB, INCLUDING SHOULDER: Status: ACTIVE | Noted: 2019-11-05

## 2019-11-05 PROBLEM — D22.61 MELANOCYTIC NEVI OF RIGHT UPPER LIMB, INCLUDING SHOULDER: Status: ACTIVE | Noted: 2019-11-05

## 2019-11-05 PROBLEM — D22.71 MELANOCYTIC NEVI OF RIGHT LOWER LIMB, INCLUDING HIP: Status: ACTIVE | Noted: 2019-11-05

## 2019-11-05 PROBLEM — D22.5 MELANOCYTIC NEVI OF TRUNK: Status: ACTIVE | Noted: 2019-11-05

## 2019-11-05 PROBLEM — D22.39 MELANOCYTIC NEVI OF OTHER PARTS OF FACE: Status: ACTIVE | Noted: 2019-11-05

## 2019-11-05 PROBLEM — D23.62 OTHER BENIGN NEOPLASM OF SKIN OF LEFT UPPER LIMB, INCLUDING SHOULDER: Status: ACTIVE | Noted: 2019-11-05

## 2019-11-05 PROBLEM — D22.72 MELANOCYTIC NEVI OF LEFT LOWER LIMB, INCLUDING HIP: Status: ACTIVE | Noted: 2019-11-05

## 2019-11-05 PROCEDURE — 99214 OFFICE O/P EST MOD 30 MIN: CPT

## 2019-11-05 PROCEDURE — ? DEFER

## 2019-11-05 PROCEDURE — ? SUNSCREEN RECOMMENDATIONS

## 2019-11-05 PROCEDURE — ? COUNSELING

## 2019-11-05 PROCEDURE — ? OBSERVATION AND MEASURE

## 2019-11-05 ASSESSMENT — LOCATION SIMPLE DESCRIPTION DERM
LOCATION SIMPLE: LEFT POSTERIOR UPPER ARM
LOCATION SIMPLE: RIGHT FOREHEAD
LOCATION SIMPLE: LEFT PRETIBIAL REGION
LOCATION SIMPLE: RIGHT UPPER BACK
LOCATION SIMPLE: LEFT POSTERIOR THIGH
LOCATION SIMPLE: RIGHT POSTERIOR UPPER ARM
LOCATION SIMPLE: RIGHT POSTERIOR THIGH
LOCATION SIMPLE: RIGHT CHEEK
LOCATION SIMPLE: RIGHT FOREARM
LOCATION SIMPLE: LEFT FOREARM
LOCATION SIMPLE: UPPER BACK
LOCATION SIMPLE: RIGHT PRETIBIAL REGION
LOCATION SIMPLE: RIGHT LOWER BACK

## 2019-11-05 ASSESSMENT — LOCATION ZONE DERM
LOCATION ZONE: LEG
LOCATION ZONE: ARM
LOCATION ZONE: TRUNK
LOCATION ZONE: FACE

## 2019-11-05 ASSESSMENT — LOCATION DETAILED DESCRIPTION DERM
LOCATION DETAILED: LEFT PROXIMAL POSTERIOR UPPER ARM
LOCATION DETAILED: INFERIOR THORACIC SPINE
LOCATION DETAILED: RIGHT INFERIOR CENTRAL MALAR CHEEK
LOCATION DETAILED: LEFT VENTRAL PROXIMAL FOREARM
LOCATION DETAILED: RIGHT INFERIOR MEDIAL MIDBACK
LOCATION DETAILED: RIGHT INFERIOR FOREHEAD
LOCATION DETAILED: LEFT LATERAL PROXIMAL PRETIBIAL REGION
LOCATION DETAILED: RIGHT DISTAL POSTERIOR THIGH
LOCATION DETAILED: RIGHT SUPERIOR UPPER BACK
LOCATION DETAILED: LEFT DISTAL POSTERIOR THIGH
LOCATION DETAILED: RIGHT DISTAL POSTERIOR UPPER ARM
LOCATION DETAILED: RIGHT INFERIOR MEDIAL UPPER BACK
LOCATION DETAILED: RIGHT VENTRAL PROXIMAL FOREARM
LOCATION DETAILED: RIGHT PROXIMAL PRETIBIAL REGION

## 2019-11-05 NOTE — PROCEDURE: DEFER
Scheduling Instructions (Optional): schedule with Dr. Becerra
Detail Level: Detailed
Procedure To Be Performed At Next Visit: Excision
Introduction Text (Please End With A Colon): The following procedure was deferred:

## 2019-11-06 ENCOUNTER — OFFICE VISIT (OUTPATIENT)
Dept: MEDICAL GROUP | Facility: LAB | Age: 54
End: 2019-11-06
Payer: COMMERCIAL

## 2019-11-06 VITALS
HEART RATE: 91 BPM | HEIGHT: 65 IN | OXYGEN SATURATION: 98 % | TEMPERATURE: 97.4 F | DIASTOLIC BLOOD PRESSURE: 80 MMHG | SYSTOLIC BLOOD PRESSURE: 130 MMHG | WEIGHT: 241.6 LBS | BODY MASS INDEX: 40.25 KG/M2

## 2019-11-06 DIAGNOSIS — E66.01 MORBID OBESITY WITH BMI OF 40.0-44.9, ADULT (HCC): ICD-10-CM

## 2019-11-06 DIAGNOSIS — Z23 NEED FOR VACCINATION: ICD-10-CM

## 2019-11-06 DIAGNOSIS — Z00.00 WELL ADULT EXAM: ICD-10-CM

## 2019-11-06 PROCEDURE — 90750 HZV VACC RECOMBINANT IM: CPT | Performed by: FAMILY MEDICINE

## 2019-11-06 PROCEDURE — 90471 IMMUNIZATION ADMIN: CPT | Performed by: FAMILY MEDICINE

## 2019-11-06 PROCEDURE — 99396 PREV VISIT EST AGE 40-64: CPT | Mod: 25 | Performed by: FAMILY MEDICINE

## 2019-11-06 NOTE — PROGRESS NOTES
Subjective:     CC:   Chief Complaint   Patient presents with   • Annual Exam       HPI:   Vicky Nino is a 54 y.o. female who presents for annual exam. She is feeling well and denies any complaints.    Patient has GYN provider: Dr Grullon  Last pap: 2019  Last mammo: 2019  Last colonoscopy: 2018, 5 year fu   Last bone density test: Age 66  Qualify for hep C screen: No  Last Tdap: Done  Gardiasil: Aged out  Hx. STD's: No  Birth control: PM    PM  No significant bloating/fluid retention, pelvic pain, or dyspareunia. No vaginal discharge   No breast tenderness, mass, nipple discharge, changes in size or contour, or abnormal cyclic discomfort.  She do not perform regular self breast exams.  Regular exercise: yes   Diet: balanced diet     She  has a past medical history of Arrhythmia, Asthma, Chest discomfort (11/18/2013), Depression, High cholesterol, History of anemia, Hypertension, Obesity, Palpitations (11/18/2013), Psychiatric disorder, Snoring, and Ventricular bigeminy (11/18/2013).  She  has a past surgical history that includes colonoscopy; knee arthroscopy (Right, 6/3/2019); medial meniscectomy (Right, 6/3/2019); and chondroplasty (Right, 6/3/2019).    Family History   Problem Relation Age of Onset   • Diabetes Mother    • Genitourinary () Problems Mother         renal failure, kidey transplant   • Hypertension Mother    • Thyroid Sister         2 with removal due to Graves   • Hypertension Brother    • Cancer Brother 60        colon cancer    • Cancer Paternal Grandmother         colon   • Cancer Paternal Grandfather         lung   • Cancer Maternal Aunt    • Hypertension Father    • Cancer Maternal Grandmother         breast   • Heart Disease Neg Hx        Social History     Socioeconomic History   • Marital status:      Spouse name: Not on file   • Number of children: Not on file   • Years of education: Not on file   • Highest education level: Not on file   Occupational History   • Not on file    Social Needs   • Financial resource strain: Not on file   • Food insecurity:     Worry: Not on file     Inability: Not on file   • Transportation needs:     Medical: Not on file     Non-medical: Not on file   Tobacco Use   • Smoking status: Never Smoker   • Smokeless tobacco: Never Used   Substance and Sexual Activity   • Alcohol use: Yes     Alcohol/week: 0.0 - 8.4 oz     Comment: Social    • Drug use: No   • Sexual activity: Not Currently     Partners: Male   Lifestyle   • Physical activity:     Days per week: Not on file     Minutes per session: Not on file   • Stress: Not on file   Relationships   • Social connections:     Talks on phone: Not on file     Gets together: Not on file     Attends [employer] service: Not on file     Active member of club or organization: Not on file     Attends meetings of clubs or organizations: Not on file     Relationship status: Not on file   • Intimate partner violence:     Fear of current or ex partner: Not on file     Emotionally abused: Not on file     Physically abused: Not on file     Forced sexual activity: Not on file   Other Topics Concern   • Not on file   Social History Narrative   • Not on file       Patient Active Problem List    Diagnosis Date Noted   • Morbid obesity with BMI of 40.0-44.9, adult ([facility]) 05/04/2018   • Dense breast tissue on mammogram 05/04/2018   • Depression 03/26/2018   • Abnormal CBC 10/20/2016   • HSV-1 infection 10/20/2016   • Elevated fasting glucose 10/06/2016   • Hyperlipidemia 10/06/2016   • HSV infection 10/06/2016   • History of cold sores 10/06/2016   • Family history of diabetes mellitus in mother 10/06/2016   • Family history of colon cancer 10/06/2016   • Asthma in adult 04/24/2015   • Environmental allergies 04/24/2015   • Essential hypertension 07/01/2014         Current Outpatient Medications   Medication Sig Dispense Refill   • albuterol 108 (90 Base) MCG/ACT Aero Soln inhalation aerosol Inhale 2 Puffs by mouth every 6 hours as  "needed for Shortness of Breath. 8.5 g 1   • amLODIPine (NORVASC) 2.5 MG Tab TAKE 1 TABLET BY MOUTH EVERY DAY 90 Tab 1   • ibuprofen (MOTRIN) 200 MG Tab Take 400 mg by mouth every bedtime.     • multivitamin (THERAGRAN) Tab Take 1 Tab by mouth every day.     • COLLAGEN PO Take  by mouth every day. 1/2 scoop     • acyclovir (ZOVIRAX) 800 MG Tab TAKE 1 TABLET ORALLY 3 TIMES A DAY (Patient taking differently: Take 800 mg by mouth as needed. TAKE 1 TABLET ORALLY 3 TIMES A DAY) 30 Tab 1   • Cholecalciferol (VITAMIN D) 1000 UNIT CAPS Take 1 Cap by mouth every day.     • aspirin (ASA) 81 MG CHEW Take 81 mg by mouth every day.       No current facility-administered medications for this visit.      No Known Allergies    Review of Systems   Constitutional: Negative for fever, chills and malaise/fatigue.   HENT: Negative for congestion.    Eyes: Negative for pain.   Respiratory: Negative for cough and shortness of breath.    Cardiovascular: Negative for leg swelling.   Gastrointestinal: Negative for nausea, vomiting, abdominal pain and diarrhea.   Genitourinary: Negative for dysuria and hematuria.   Skin: Negative for rash.   Neurological: Negative for dizziness, focal weakness and headaches.   Endo/Heme/Allergies: Does not bruise/bleed easily.   Psychiatric/Behavioral: Negative for depression.  The patient is not nervous/anxious.      Objective:     /80 (BP Location: Left arm, Patient Position: Sitting)   Pulse 91   Temp 36.3 °C (97.4 °F) (Temporal)   Ht 1.651 m (5' 5\")   Wt 109.6 kg (241 lb 9.6 oz)   LMP 09/18/2013   SpO2 98%   BMI 40.20 kg/m²   Body mass index is 40.2 kg/m².  Wt Readings from Last 4 Encounters:   11/06/19 109.6 kg (241 lb 9.6 oz)   10/10/19 109.3 kg (241 lb)   07/26/19 108.9 kg (240 lb)   06/03/19 109.1 kg (240 lb 8.4 oz)       Physical Exam:  Constitutional: Well-developed and well-nourished. Not diaphoretic. No distress.   Skin: Skin is warm and dry. No rash noted.  Head: Atraumatic without " lesions.  Eyes: Conjunctivae and extraocular motions are normal. Pupils are equal, round, and reactive to light. No scleral icterus.   Ears:  External ears unremarkable. Tympanic membranes clear and intact.  Nose: Nares patent. Septum midline. Turbinates without erythema nor edema. No discharge.   Mouth/Throat: Dentition is good. Tongue normal. Oropharynx is clear and moist. Posterior pharynx without erythema or exudates.  Neck: Supple, trachea midline. Normal range of motion. No thyromegaly present. No lymphadenopathy--cervical or supraclavicular.  Cardiovascular: Regular rate and rhythm, S1 and S2 without murmur, rubs, or gallops.    Abdomen: Soft, non tender, and without distention. Active bowel sounds in all four quadrants. No rebound, guarding, masses or HSM.  Extremities: No cyanosis, clubbing, erythema, nor edema. Distal pulses intact and symmetric.   Musculoskeletal: All major joints AROM full in all directions without pain.  Neurological: Alert and oriented x 3  Psychiatric:  Behavior, mood, and affect are appropriate.    Assessment and Plan:     1. Well adult exam  Normal physical exam, up-to-date on vaccines, pending labs  - Patient identified as having weight management issue.  Appropriate orders and counseling given.    2. Need for vaccination  Administered today  - Shingles Vaccine (Shingrix)    3. Morbid obesity with BMI of 40.0-44.9, adult ([facility]CC)  - REFERRAL TO [facility] IMPROVEMENT PROGRAMS (HIP) Services Requested: Physician Medical Weight Management Program; Reason for Referral? BMI>30; Reason for Visit: Overweight/Obesity        Labs per orders  Immunizations per orders  Patient counseled about skin care, diet, supplements, vitamins, safe sex and exercise.    Follow-up: No follow-ups on file.    Please note that this dictation was created using voice recognition software. I have made every reasonable attempt to correct obvious errors, but I expect that there are errors of grammar and possibly  content that I did not discover before finalizing the note.

## 2019-11-06 NOTE — PROGRESS NOTES
Subjective:     CC: ***    HPI:   Vicky presents today with ***    No problem-specific Assessment & Plan notes found for this encounter.      Past Medical History:   Diagnosis Date   • Arrhythmia     rapid heart rate    • Asthma     has not used inhaler in a year   • Chest discomfort 11/18/2013   • Depression    • High cholesterol    • History of anemia    • Hypertension    • Obesity    • Palpitations 11/18/2013   • Psychiatric disorder     anxiety   • Snoring    • Ventricular bigeminy 11/18/2013       Social History     Tobacco Use   • Smoking status: Never Smoker   • Smokeless tobacco: Never Used   Substance Use Topics   • Alcohol use: Yes     Alcohol/week: 0.0 - 8.4 oz     Comment: Social    • Drug use: No       Current Outpatient Medications Ordered in Epic   Medication Sig Dispense Refill   • albuterol 108 (90 Base) MCG/ACT Aero Soln inhalation aerosol Inhale 2 Puffs by mouth every 6 hours as needed for Shortness of Breath. 8.5 g 1   • amLODIPine (NORVASC) 2.5 MG Tab TAKE 1 TABLET BY MOUTH EVERY DAY 90 Tab 1   • ibuprofen (MOTRIN) 200 MG Tab Take 400 mg by mouth every bedtime.     • multivitamin (THERAGRAN) Tab Take 1 Tab by mouth every day.     • COLLAGEN PO Take  by mouth every day. 1/2 scoop     • acyclovir (ZOVIRAX) 800 MG Tab TAKE 1 TABLET ORALLY 3 TIMES A DAY (Patient taking differently: Take 800 mg by mouth as needed. TAKE 1 TABLET ORALLY 3 TIMES A DAY) 30 Tab 1   • Cholecalciferol (VITAMIN D) 1000 UNIT CAPS Take 1 Cap by mouth every day.     • aspirin (ASA) 81 MG CHEW Take 81 mg by mouth every day.       No current [facility]-ordered facility-administered medications on file.        Allergies:  Patient has no known allergies.    Health Maintenance: {COMPLETED:158304}    ROS:  Gen: no fevers/chill, no changes in weight  Eyes: no changes in vision  ENT: no sore throat, no hearing loss, no bloody nose  Pulm: no sob, no cough  CV: no chest pain, no palpitations  GI: no nausea/vomiting, no diarrhea  : no  dysuria  MSk: no myalgias  Skin: no rash  Neuro: no headaches, no numbness/tingling  Heme/Lymph: no easy bruising      Objective:       Exam:  LMP 09/18/2013  There is no height or weight on file to calculate BMI.    Gen: Alert and oriented, No apparent distress.  Neck: Neck is supple without lymphadenopathy.  Lungs: Normal effort, CTA bilaterally, no wheezes, rhonchi, or rales  CV: Regular rate and rhythm. No murmurs, rubs, or gallops.               Ext: No clubbing, cyanosis, edema.    Labs: ***    Assessment & Plan:     54 y.o. female with the following -     There are no diagnoses linked to this encounter.      Please note that this dictation was created using voice recognition software. I have made every reasonable attempt to correct obvious errors, but I expect that there are errors of grammar and possibly content that I did not discover before finalizing the note.

## 2019-11-18 ENCOUNTER — OFFICE VISIT (OUTPATIENT)
Dept: MEDICAL GROUP | Facility: LAB | Age: 54
End: 2019-11-18
Payer: COMMERCIAL

## 2019-11-18 VITALS
BODY MASS INDEX: 40.48 KG/M2 | SYSTOLIC BLOOD PRESSURE: 126 MMHG | HEART RATE: 83 BPM | WEIGHT: 243 LBS | OXYGEN SATURATION: 94 % | TEMPERATURE: 97.2 F | HEIGHT: 65 IN | DIASTOLIC BLOOD PRESSURE: 72 MMHG

## 2019-11-18 DIAGNOSIS — Z91.09 ENVIRONMENTAL ALLERGIES: ICD-10-CM

## 2019-11-18 DIAGNOSIS — J45.909 ASTHMA IN ADULT WITHOUT COMPLICATION, UNSPECIFIED ASTHMA SEVERITY, UNSPECIFIED WHETHER PERSISTENT: ICD-10-CM

## 2019-11-18 PROCEDURE — 99214 OFFICE O/P EST MOD 30 MIN: CPT | Performed by: FAMILY MEDICINE

## 2019-11-18 RX ORDER — FLUTICASONE PROPIONATE 50 MCG
2 SPRAY, SUSPENSION (ML) NASAL DAILY
Qty: 1 BOTTLE | Refills: 11 | Status: SHIPPED | OUTPATIENT
Start: 2019-11-18

## 2019-11-18 ASSESSMENT — PATIENT HEALTH QUESTIONNAIRE - PHQ9
1. LITTLE INTEREST OR PLEASURE IN DOING THINGS: SEVERAL DAYS
7. TROUBLE CONCENTRATING ON THINGS, SUCH AS READING THE NEWSPAPER OR WATCHING TELEVISION: SEVERAL DAYS
3. TROUBLE FALLING OR STAYING ASLEEP OR SLEEPING TOO MUCH: SEVERAL DAYS
5. POOR APPETITE OR OVEREATING: NOT AT ALL
9. THOUGHTS THAT YOU WOULD BE BETTER OFF DEAD, OR OF HURTING YOURSELF: NOT AT ALL
SUM OF ALL RESPONSES TO PHQ QUESTIONS 1-9: 4
8. MOVING OR SPEAKING SO SLOWLY THAT OTHER PEOPLE COULD HAVE NOTICED. OR THE OPPOSITE, BEING SO FIGETY OR RESTLESS THAT YOU HAVE BEEN MOVING AROUND A LOT MORE THAN USUAL: NOT AT ALL
SUM OF ALL RESPONSES TO PHQ9 QUESTIONS 1 AND 2: 2
6. FEELING BAD ABOUT YOURSELF - OR THAT YOU ARE A FAILURE OR HAVE LET YOURSELF OR YOUR FAMILY DOWN: NOT AL ALL
4. FEELING TIRED OR HAVING LITTLE ENERGY: NOT AT ALL
2. FEELING DOWN, DEPRESSED, IRRITABLE, OR HOPELESS: SEVERAL DAYS

## 2019-11-19 ENCOUNTER — OFFICE VISIT (OUTPATIENT)
Dept: HEALTH INFORMATION MANAGEMENT | Facility: MEDICAL CENTER | Age: 54
End: 2019-11-19
Payer: COMMERCIAL

## 2019-11-19 VITALS
OXYGEN SATURATION: 95 % | HEART RATE: 85 BPM | WEIGHT: 240.4 LBS | BODY MASS INDEX: 40.05 KG/M2 | HEIGHT: 65 IN | SYSTOLIC BLOOD PRESSURE: 122 MMHG | DIASTOLIC BLOOD PRESSURE: 80 MMHG

## 2019-11-19 DIAGNOSIS — I10 ESSENTIAL HYPERTENSION: ICD-10-CM

## 2019-11-19 DIAGNOSIS — R53.82 CHRONIC FATIGUE: ICD-10-CM

## 2019-11-19 DIAGNOSIS — E88.810 METABOLIC SYNDROME: ICD-10-CM

## 2019-11-19 DIAGNOSIS — E66.01 MORBID OBESITY WITH BMI OF 40.0-44.9, ADULT (HCC): ICD-10-CM

## 2019-11-19 DIAGNOSIS — E78.5 HYPERLIPIDEMIA, UNSPECIFIED HYPERLIPIDEMIA TYPE: ICD-10-CM

## 2019-11-19 DIAGNOSIS — F32.9 REACTIVE DEPRESSION: ICD-10-CM

## 2019-11-19 DIAGNOSIS — R73.01 ELEVATED FASTING GLUCOSE: ICD-10-CM

## 2019-11-19 PROCEDURE — 99203 OFFICE O/P NEW LOW 30 MIN: CPT | Performed by: INTERNAL MEDICINE

## 2019-11-19 ASSESSMENT — PATIENT HEALTH QUESTIONNAIRE - PHQ9: CLINICAL INTERPRETATION OF PHQ2 SCORE: 0
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
## 2019-11-19 NOTE — PROGRESS NOTES
Subjective:     CC: Asthma follow-up    HPI:   Vicky presents today with     Asthma/allergies:  This is a chronic unstable issue.  Patient was seen previously for episodes of shortness of breath throughout the day.  Previously she was short of breath and fatigued and often dizzy however she found that taking her albuterol every morning helped her.  She is no longer dizzy.  She feels a congestion in the morning and she takes albuterol and it helps her.  She is being treated by an allergist for her allergies.  She however is not on a nasal spray and has just started taking an antihistamine.      Past Medical History:   Diagnosis Date   • Arrhythmia     rapid heart rate    • Asthma     has not used inhaler in a year   • Chest discomfort 11/18/2013   • Depression    • High cholesterol    • History of anemia    • Hypertension    • Obesity    • Palpitations 11/18/2013   • Psychiatric disorder     anxiety   • Snoring    • Ventricular bigeminy 11/18/2013       Social History     Tobacco Use   • Smoking status: Never Smoker   • Smokeless tobacco: Never Used   Substance Use Topics   • Alcohol use: Yes     Alcohol/week: 0.0 - 8.4 oz     Comment: Social    • Drug use: No       Current Outpatient Medications Ordered in Epic   Medication Sig Dispense Refill   • fluticasone (FLONASE) 50 MCG/ACT nasal spray Spray 2 Sprays in nose every day. 1 Bottle 11   • albuterol 108 (90 Base) MCG/ACT Aero Soln inhalation aerosol Inhale 2 Puffs by mouth every 6 hours as needed for Shortness of Breath. 8.5 g 1   • amLODIPine (NORVASC) 2.5 MG Tab TAKE 1 TABLET BY MOUTH EVERY DAY 90 Tab 1   • ibuprofen (MOTRIN) 200 MG Tab Take 400 mg by mouth every bedtime.     • multivitamin (THERAGRAN) Tab Take 1 Tab by mouth every day.     • COLLAGEN PO Take  by mouth every day. 1/2 scoop     • acyclovir (ZOVIRAX) 800 MG Tab TAKE 1 TABLET ORALLY 3 TIMES A DAY (Patient taking differently: Take 800 mg by mouth as needed. TAKE 1 TABLET ORALLY 3 TIMES A DAY) 30 Tab  "1   • Cholecalciferol (VITAMIN D) 1000 UNIT CAPS Take 1 Cap by mouth every day.     • aspirin (ASA) 81 MG CHEW Take 81 mg by mouth every day.       No current [facility]-ordered facility-administered medications on file.        Allergies:  Patient has no known allergies.      ROS:  Gen: no fevers/chill, no changes in weight  Eyes: no changes in vision  ENT: no sore throat, no hearing loss, no bloody nose  Pulm: +sob, no cough  CV: no chest pain, no palpitations  GI: no nausea/vomiting, no diarrhea  : no dysuria  MSk: no myalgias  Skin: no rash  Neuro: no headaches, no numbness/tingling  Heme/Lymph: no easy bruising      Objective:       Exam:  /72 (BP Location: Left arm, Patient Position: Sitting)   Pulse 83   Temp 36.2 °C (97.2 °F) (Temporal)   Ht 1.651 m (5' 5\")   Wt 110.2 kg (243 lb)   LMP 09/18/2013   SpO2 94%   BMI 40.44 kg/m²  Body mass index is 40.44 kg/m².    Gen: Alert and oriented, No apparent distress.  Neck: Neck is supple without lymphadenopathy.  Lungs: Normal effort, CTA bilaterally, no wheezes, rhonchi, or rales  CV: Regular rate and rhythm. No murmurs, rubs, or gallops.               Ext: No clubbing, cyanosis, edema.    Assessment & Plan:     54 y.o. female with the following -     1. Environmental allergies  2. Asthma in adult without complication, unspecified asthma severity, unspecifiedwhether persistent  Advised patient against using albuterol every morning as a maintenance inhaler and would like patient to use only when short of breath throughout the day.  I believe her shortness of breath in the morning is likely postnasal drip and more upper respiratory than anything.  Patient will start nasal spray, nasal rinse and continue antihistamine.  She is to use albuterol as needed and in 6 weeks if we find that she needs a maintenance inhaler we will start her at that time.      Please note that this dictation was created using voice recognition software. I have made every reasonable " attempt to correct obvious errors, but I expect that there are errors of grammar and possibly content that I did not discover before finalizing the note.

## 2019-11-20 NOTE — PROGRESS NOTES
"11/19/2019     Vicky Nino 54 y.o.        Time in/out: 2:48-3:20pm    Anthropometrics/Objective  Vitals:    11/19/19 1413   BP: 122/80   Weight: 109 kg (240 lb 6.4 oz)   Height: 1.651 m (5' 5\")     BMI: Body mass index is 40 kg/m².  Stated Goal Weight: 150 lbs  See comprehensive patient history form for further information     Subjective:  Pt is here today for the initial screening visit for the medical weight management program.     - likes to cook  - has had success with mindful eating in the past (3 years ago)  - finds herself mindlessly eating at night   - avoids low fat options as she was told they have more carcinogens d/t processing     See HIP Medical Questionnaire in media for more detailed diet history     Drinks: green tea or black tea unsweetened, coffee with whole milk, sparkling water, wine   Nutrition Diagnosis (PES Statement)  · Obesity related to excessive energy intake and inadequate energy expenditure as evidenced by BMI >30     Client history:  Condition(s) associated with a diagnosis or treatment / Pertinent Medical Hx: HTN, elevated fasting glucose, hyperlipidemia, depression, morbid obesity, metabolic syndrome, chronic fatigue    Biochemical data, medical test and procedures  Lab Results   Component Value Date/Time    HBA1C 5.7 (H) 05/29/2019 04:10 PM     No results found for: POCGLUCOSE  Lab Results   Component Value Date/Time    CHOLSTRLTOT 211 (H) 11/09/2018 04:30 PM     (H) 11/09/2018 04:30 PM    HDL 60 11/09/2018 04:30 PM    TRIGLYCERIDE 128 11/09/2018 04:30 PM         Nutrition Intervention  Nutrition Prescription  Recommended Daily Kcals: 1500 Kcal based on REE of 1732   Recommended Daily Protein: 100g or ~30% of kcals      Meal Plan Recommendation   Calorie controlled, high protein, low carb diet    with 1-2 meal replacements per day  1-2 snacks; 1 oz protein + non-starchy veggies or 1 fruit      Comprehensive Nutrition Education Instruction or training leading to in-depth " nutrition related knowledge about:   Benefits to following meal plan, combine carb, protein and fat at each meal, meal timing and spacing, portion control, sweets and alcohol in moderation.  Handouts provided regarding topics discussed:   - Meal Plan   - My Plate Planner    - Snack list without fruit   - Meal ideas   - MyFitnessPal How-To     Monitoring & Evaluation Plan    Behavioral-Environmental:  Behavior: Keep a food journal and bring to next appointment   Physical activity: Did not discuss today     Food / Nutrient Intake:  Food intake: Follow meal plan as discussed. Avoid concentrated sweets and processed carbs. Use the plate method for portion control and macronutrient balance. Limit carbs/starch to up to 1 cup per meal. Snacks should be 1oz protein + ns veggies or fruit. Fruit once per day.     Fluid intake: Consume at least 64 oz water per day. Avoid all sweetened beverages. Limit coffee to 1 cup a day (ideally no cream or sugar). Limit or avoid alcohol as discussed with Dr. Burton.     Physical Signs / Symptoms:  Weight loss towards BMI < 30   Weight change: loss of 1-2 lbs/week    Assessment Notes:  Today I oriented Vicky to Dr. Hernandez's Medical Weight Management program. Encouraged a higher protein, lower carbohydrate, and calorie controlled meal plan consisting of 3 meals and 1-2 snacks per day with optional use of meal replacements. Encouraged patient to track their food/beverage intake on My fitness Pal or utilize a written food journal.  We discussed how to build protein into each meal and snack, incorporating 1/2 plate non-starchy vegetable twice daily, optional 1/2 cup of complex carbohydrate at meals if desired, and avoiding refined carbohydrates and simple sugars. Reviewed snack guidelines to include 1 oz protein and optional non-starchy vegetable or 1 serving of fruit.      Vicky will be aiming for 1500 kcal/d.  She will also be looking at the macronutrient feature and aiming for 100g  or less of carbohydrate and 100g or more of protein per day per Dr. Hernandez recommendations (or 30% or less of CHO and 30% or more of protein from calories).     Vicky and I did not discuss specific kcal or macronutrient goals at this visit.  She will track her intake to see where we can make some modifications.  She finds herself eating snacks at night, often.  We discussed strategies to keep busy instead of munching.    Goals:  1. Tracking  2. Reducing wine to 1 glass per night working that down as we progress  3. Keeping hands busy at night while watching TV - considering knitting    F/U: 2 weeks RD, 4-6 weeks RD/MD

## 2019-12-02 ENCOUNTER — NON-PROVIDER VISIT (OUTPATIENT)
Dept: HEALTH INFORMATION MANAGEMENT | Facility: MEDICAL CENTER | Age: 54
End: 2019-12-02
Payer: COMMERCIAL

## 2019-12-02 DIAGNOSIS — E66.01 MORBID OBESITY WITH BMI OF 40.0-44.9, ADULT (HCC): ICD-10-CM

## 2019-12-02 PROCEDURE — 97803 MED NUTRITION INDIV SUBSEQ: CPT | Performed by: DIETITIAN, REGISTERED

## 2019-12-03 NOTE — PROGRESS NOTES
Nutrition Reassess: Medical Weight Management   Today's date: 12/3/2019  Referring Provider: No ref. provider found      Time: in/out 3:10-3:45  Visit#: 2    Subjective:  - has started tracking  - feels like she needs to eat a healthy fat at every meal  - interested in intermittent fasting     Anthropometrics/Objective  Today's weight:  There were no vitals filed for this visit.  BMI:  There is no height or weight on file to calculate BMI.   - Vicky politely declined weight at this visit         Meal Plan:   High protein, low carb  with 1-2 meal replacements per day     ReAssesment/Notes:    Vicky has been working towards her weight loss goals.  She has found tracking to be very helpful in making her mindful and allowing her to know where foods go on her plate. She finds it difficult to get her meals in as she does not have a consistent schedule, even at work at school.  She finds she sometimes skips her lunch due to time constraints. Thinks it would be of benefit to use MR for lunch as that is something she can have on the go if she needs to sub at work during her prep period.  She usually has her breakfast late (around 10:30) which is oatmeal and feels by her lunch (1:30) that she is often not hungry which also compels her to skip meals.  She will try the MR for lunch regularly. Vicky was also concerned to if she would need to incorporate healthy fats as she states she tries to have a healthy fat at every meal but informed her that is not necessary but if she would like to have something in addition to the shake she may add some NS veggies.   She recently was diagnosed with diverticulosis and understands that fiber would be of benefit for her but has not had any significant dietary intervention for this.   Vicky is also interested in intermittent fasting and eating between the times of 10:30am-6pm - informed patient to discuss with MD at next visit for how her MR plan may change.     Goals:   1. Continue tracking  with kcal and macro goals (1500 kcal)  2. MR for lunch everyday    Follow-up: 2 weeks MD/JAQUELIN

## 2019-12-09 ENCOUNTER — APPOINTMENT (RX ONLY)
Dept: URBAN - METROPOLITAN AREA CLINIC 4 | Facility: CLINIC | Age: 54
Setting detail: DERMATOLOGY
End: 2019-12-09

## 2019-12-09 DIAGNOSIS — L72.8 OTHER FOLLICULAR CYSTS OF THE SKIN AND SUBCUTANEOUS TISSUE: ICD-10-CM

## 2019-12-09 PROBLEM — D48.5 NEOPLASM OF UNCERTAIN BEHAVIOR OF SKIN: Status: ACTIVE | Noted: 2019-12-09

## 2019-12-09 PROCEDURE — 13132 CMPLX RPR F/C/C/M/N/AX/G/H/F: CPT

## 2019-12-09 PROCEDURE — ? EXCISION

## 2019-12-09 PROCEDURE — 11402 EXC TR-EXT B9+MARG 1.1-2 CM: CPT

## 2019-12-09 ASSESSMENT — LOCATION DETAILED DESCRIPTION DERM: LOCATION DETAILED: LEFT AXILLARY VAULT

## 2019-12-09 ASSESSMENT — LOCATION ZONE DERM: LOCATION ZONE: AXILLAE

## 2019-12-09 ASSESSMENT — LOCATION SIMPLE DESCRIPTION DERM: LOCATION SIMPLE: LEFT AXILLARY VAULT

## 2019-12-09 NOTE — PROCEDURE: EXCISION
O-L Flap Text: The defect edges were debeveled with a #15 scalpel blade.  Given the location of the defect, shape of the defect and the proximity to free margins an O-L flap was deemed most appropriate.  Using a sterile surgical marker, an appropriate advancement flap was drawn incorporating the defect and placing the expected incisions within the relaxed skin tension lines where possible.    The area thus outlined was incised deep to adipose tissue with a #15 scalpel blade.  The skin margins were undermined to an appropriate distance in all directions utilizing iris scissors.
Complex Repair And Skin Substitute Graft Text: The defect edges were debeveled with a #15 scalpel blade.  The primary defect was closed partially with a complex linear closure.  Given the location of the remaining defect, shape of the defect and the proximity to free margins a skin substitute graft was deemed most appropriate to repair the remaining defect.  The graft was trimmed to fit the size of the remaining defect.  The graft was then placed in the primary defect, oriented appropriately, and sutured into place.
Dorsal Nasal Flap Text: The defect edges were debeveled with a #15 scalpel blade.  Given the location of the defect and the proximity to free margins a dorsal nasal flap was deemed most appropriate.  Using a sterile surgical marker, an appropriate dorsal nasal flap was drawn around the defect.    The area thus outlined was incised deep to adipose tissue with a #15 scalpel blade.  The skin margins were undermined to an appropriate distance in all directions utilizing iris scissors.
Show Anatomic Location From Referring Provider Variable: Yes
Medical Necessity Information: It is in your best interest to select a reason for this procedure from the list below. All of these items fulfill various CMS LCD requirements except lesion extends to a margin.
Complex Repair And O-T Advancement Flap Text: The defect edges were debeveled with a #15 scalpel blade.  The primary defect was closed partially with a complex linear closure.  Given the location of the remaining defect, shape of the defect and the proximity to free margins an O-T advancement flap was deemed most appropriate for complete closure of the defect.  Using a sterile surgical marker, an appropriate advancement flap was drawn incorporating the defect and placing the expected incisions within the relaxed skin tension lines where possible.    The area thus outlined was incised deep to adipose tissue with a #15 scalpel blade.  The skin margins were undermined to an appropriate distance in all directions utilizing iris scissors.
Lazy S Intermediate Repair Preamble Text (Leave Blank If You Do Not Want): Undermining was performed with blunt dissection.
Mastoid Interpolation Flap Text: A decision was made to reconstruct the defect utilizing an interpolation axial flap and a staged reconstruction.  A telfa template was made of the defect.  This telfa template was then used to outline the mastoid interpolation flap.  The donor area for the pedicle flap was then injected with anesthesia.  The flap was excised through the skin and subcutaneous tissue down to the layer of the underlying musculature.  The pedicle flap was carefully excised within this deep plane to maintain its blood supply.  The edges of the donor site were undermined.   The donor site was closed in a primary fashion.  The pedicle was then rotated into position and sutured.  Once the tube was sutured into place, adequate blood supply was confirmed with blanching and refill.  The pedicle was then wrapped with xeroform gauze and dressed appropriately with a telfa and gauze bandage to ensure continued blood supply and protect the attached pedicle.
Complex Repair And A-T Advancement Flap Text: The defect edges were debeveled with a #15 scalpel blade.  The primary defect was closed partially with a complex linear closure.  Given the location of the remaining defect, shape of the defect and the proximity to free margins an A-T advancement flap was deemed most appropriate for complete closure of the defect.  Using a sterile surgical marker, an appropriate advancement flap was drawn incorporating the defect and placing the expected incisions within the relaxed skin tension lines where possible.    The area thus outlined was incised deep to adipose tissue with a #15 scalpel blade.  The skin margins were undermined to an appropriate distance in all directions utilizing iris scissors.
Render Path Notes In Note?: no
Melolabial Interpolation Flap Text: A decision was made to reconstruct the defect utilizing an interpolation axial flap and a staged reconstruction.  A telfa template was made of the defect.  This telfa template was then used to outline the melolabial interpolation flap.  The donor area for the pedicle flap was then injected with anesthesia.  The flap was excised through the skin and subcutaneous tissue down to the layer of the underlying musculature.  The pedicle flap was carefully excised within this deep plane to maintain its blood supply.  The edges of the donor site were undermined.   The donor site was closed in a primary fashion.  The pedicle was then rotated into position and sutured.  Once the tube was sutured into place, adequate blood supply was confirmed with blanching and refill.  The pedicle was then wrapped with xeroform gauze and dressed appropriately with a telfa and gauze bandage to ensure continued blood supply and protect the attached pedicle.
O-T Advancement Flap Text: The defect edges were debeveled with a #15 scalpel blade.  Given the location of the defect, shape of the defect and the proximity to free margins an O-T advancement flap was deemed most appropriate.  Using a sterile surgical marker, an appropriate advancement flap was drawn incorporating the defect and placing the expected incisions within the relaxed skin tension lines where possible.    The area thus outlined was incised deep to adipose tissue with a #15 scalpel blade.  The skin margins were undermined to an appropriate distance in all directions utilizing iris scissors.
Repair Anesthesia Method: local infiltration
Path Notes (To The Dermatopathologist): Please check margins.
Posterior Auricular Interpolation Flap Text: A decision was made to reconstruct the defect utilizing an interpolation axial flap and a staged reconstruction.  A telfa template was made of the defect.  This telfa template was then used to outline the posterior auricular interpolation flap.  The donor area for the pedicle flap was then injected with anesthesia.  The flap was excised through the skin and subcutaneous tissue down to the layer of the underlying musculature.  The pedicle flap was carefully excised within this deep plane to maintain its blood supply.  The edges of the donor site were undermined.   The donor site was closed in a primary fashion.  The pedicle was then rotated into position and sutured.  Once the tube was sutured into place, adequate blood supply was confirmed with blanching and refill.  The pedicle was then wrapped with xeroform gauze and dressed appropriately with a telfa and gauze bandage to ensure continued blood supply and protect the attached pedicle.
Island Pedicle Flap Text: The defect edges were debeveled with a #15 scalpel blade.  Given the location of the defect, shape of the defect and the proximity to free margins an island pedicle advancement flap was deemed most appropriate.  Using a sterile surgical marker, an appropriate advancement flap was drawn incorporating the defect, outlining the appropriate donor tissue and placing the expected incisions within the relaxed skin tension lines where possible.    The area thus outlined was incised deep to adipose tissue with a #15 scalpel blade.  The skin margins were undermined to an appropriate distance in all directions around the primary defect and laterally outward around the island pedicle utilizing iris scissors.  There was minimal undermining beneath the pedicle flap.
Anesthesia Type: 1% lidocaine with 1:100,000 epinephrine and 408mcg clindamycin/ml and a 1:10 solution of 8.4% sodium bicarbonate
Complex Repair And O-L Flap Text: The defect edges were debeveled with a #15 scalpel blade.  The primary defect was closed partially with a complex linear closure.  Given the location of the remaining defect, shape of the defect and the proximity to free margins an O-L flap was deemed most appropriate for complete closure of the defect.  Using a sterile surgical marker, an appropriate flap was drawn incorporating the defect and placing the expected incisions within the relaxed skin tension lines where possible.    The area thus outlined was incised deep to adipose tissue with a #15 scalpel blade.  The skin margins were undermined to an appropriate distance in all directions utilizing iris scissors.
O-Z Flap Text: The defect edges were debeveled with a #15 scalpel blade.  Given the location of the defect, shape of the defect and the proximity to free margins an O-Z flap was deemed most appropriate.  Using a sterile surgical marker, an appropriate transposition flap was drawn incorporating the defect and placing the expected incisions within the relaxed skin tension lines where possible. The area thus outlined was incised deep to adipose tissue with a #15 scalpel blade.  The skin margins were undermined to an appropriate distance in all directions utilizing iris scissors.
Epidermal Closure: running locked
Surgeon (Optional): fred
Complex Repair And Bilobe Flap Text: The defect edges were debeveled with a #15 scalpel blade.  The primary defect was closed partially with a complex linear closure.  Given the location of the remaining defect, shape of the defect and the proximity to free margins a bilobe flap was deemed most appropriate for complete closure of the defect.  Using a sterile surgical marker, an appropriate advancement flap was drawn incorporating the defect and placing the expected incisions within the relaxed skin tension lines where possible.    The area thus outlined was incised deep to adipose tissue with a #15 scalpel blade.  The skin margins were undermined to an appropriate distance in all directions utilizing iris scissors.
Paramedian Forehead Flap Text: A decision was made to reconstruct the defect utilizing an interpolation axial flap and a staged reconstruction.  A telfa template was made of the defect.  This telfa template was then used to outline the paramedian forehead pedicle flap.  The donor area for the pedicle flap was then injected with anesthesia.  The flap was excised through the skin and subcutaneous tissue down to the layer of the underlying musculature.  The pedicle flap was carefully excised within this deep plane to maintain its blood supply.  The edges of the donor site were undermined.   The donor site was closed in a primary fashion.  The pedicle was then rotated into position and sutured.  Once the tube was sutured into place, adequate blood supply was confirmed with blanching and refill.  The pedicle was then wrapped with xeroform gauze and dressed appropriately with a telfa and gauze bandage to ensure continued blood supply and protect the attached pedicle.
Double O-Z Flap Text: The defect edges were debeveled with a #15 scalpel blade.  Given the location of the defect, shape of the defect and the proximity to free margins a Double O-Z flap was deemed most appropriate.  Using a sterile surgical marker, an appropriate transposition flap was drawn incorporating the defect and placing the expected incisions within the relaxed skin tension lines where possible. The area thus outlined was incised deep to adipose tissue with a #15 scalpel blade.  The skin margins were undermined to an appropriate distance in all directions utilizing iris scissors.
Island Pedicle Flap With Canthal Suspension Text: The defect edges were debeveled with a #15 scalpel blade.  Given the location of the defect, shape of the defect and the proximity to free margins an island pedicle advancement flap was deemed most appropriate.  Using a sterile surgical marker, an appropriate advancement flap was drawn incorporating the defect, outlining the appropriate donor tissue and placing the expected incisions within the relaxed skin tension lines where possible. The area thus outlined was incised deep to adipose tissue with a #15 scalpel blade.  The skin margins were undermined to an appropriate distance in all directions around the primary defect and laterally outward around the island pedicle utilizing iris scissors.  There was minimal undermining beneath the pedicle flap. A suspension suture was placed in the canthal tendon to prevent tension and prevent ectropion.
V-Y Flap Text: The defect edges were debeveled with a #15 scalpel blade.  Given the location of the defect, shape of the defect and the proximity to free margins a V-Y flap was deemed most appropriate.  Using a sterile surgical marker, an appropriate advancement flap was drawn incorporating the defect and placing the expected incisions within the relaxed skin tension lines where possible.    The area thus outlined was incised deep to adipose tissue with a #15 scalpel blade.  The skin margins were undermined to an appropriate distance in all directions utilizing iris scissors.
Alar Island Pedicle Flap Text: The defect edges were debeveled with a #15 scalpel blade.  Given the location of the defect, shape of the defect and the proximity to the alar rim an island pedicle advancement flap was deemed most appropriate.  Using a sterile surgical marker, an appropriate advancement flap was drawn incorporating the defect, outlining the appropriate donor tissue and placing the expected incisions within the nasal ala running parallel to the alar rim. The area thus outlined was incised with a #15 scalpel blade.  The skin margins were undermined minimally to an appropriate distance in all directions around the primary defect and laterally outward around the island pedicle utilizing iris scissors.  There was minimal undermining beneath the pedicle flap.
Anesthesia Volume In Cc: 4.4
Complex Repair And Melolabial Flap Text: The defect edges were debeveled with a #15 scalpel blade.  The primary defect was closed partially with a complex linear closure.  Given the location of the remaining defect, shape of the defect and the proximity to free margins a melolabial flap was deemed most appropriate for complete closure of the defect.  Using a sterile surgical marker, an appropriate advancement flap was drawn incorporating the defect and placing the expected incisions within the relaxed skin tension lines where possible.    The area thus outlined was incised deep to adipose tissue with a #15 scalpel blade.  The skin margins were undermined to an appropriate distance in all directions utilizing iris scissors.
Rotation Flap Text: The defect edges were debeveled with a #15 scalpel blade.  Given the location of the defect, shape of the defect and the proximity to free margins a rotation flap was deemed most appropriate.  Using a sterile surgical marker, an appropriate rotation flap was drawn incorporating the defect and placing the expected incisions within the relaxed skin tension lines where possible.    The area thus outlined was incised deep to adipose tissue with a #15 scalpel blade.  The skin margins were undermined to an appropriate distance in all directions utilizing iris scissors.
Fusiform Excision Additional Text (Leave Blank If You Do Not Want): The margin was drawn around the clinically apparent lesion.  A fusiform shape was then drawn on the skin incorporating the lesion and margins.  Incisions were then made along these lines to the appropriate tissue plane and the lesion was extirpated.
Lip Wedge Excision Repair Text: Given the location of the defect and the proximity to free margins a full thickness wedge repair was deemed most appropriate.  Using a sterile surgical marker, the appropriate repair was drawn incorporating the defect and placing the expected incisions perpendicular to the vermilion border.  The vermilion border was also meticulously outlined to ensure appropriate reapproximation during the repair.  The area thus outlined was incised through and through with a #15 scalpel blade.  The muscularis and dermis were reaproximated with deep sutures following hemostasis. Care was taken to realign the vermilion border before proceeding with the superficial closure.  Once the vermilion was realigned the superfical and mucosal closure was finished.
Saucerization Excision Additional Text (Leave Blank If You Do Not Want): The margin was drawn around the clinically apparent lesion.  Incisions were then made along these lines, in a tangential fashion, to the appropriate tissue plane and the lesion was extirpated.
Ftsg Text: The defect edges were debeveled with a #15 scalpel blade.  Given the location of the defect, shape of the defect and the proximity to free margins a full thickness skin graft was deemed most appropriate.  Using a sterile surgical marker, the primary defect shape was transferred to the donor site. The area thus outlined was incised deep to adipose tissue with a #15 scalpel blade.  The harvested graft was then trimmed of adipose tissue until only dermis and epidermis was left.  The skin margins of the secondary defect were undermined to an appropriate distance in all directions utilizing iris scissors.  The secondary defect was closed with interrupted buried subcutaneous sutures.  The skin edges were then re-apposed with running  sutures.  The skin graft was then placed in the primary defect and oriented appropriately.
Consent was obtained from the patient. The risks and benefits to therapy were discussed in detail. Specifically, the risks of infection, scarring, bleeding, prolonged wound healing, incomplete removal, allergy to anesthesia, nerve injury and recurrence were addressed. Prior to the procedure, the treatment site was clearly identified and confirmed by the patient. All components of Universal Protocol/PAUSE Rule completed.
Eliptical Excision Additional Text (Leave Blank If You Do Not Want): The margin was drawn around the clinically apparent lesion.  An elliptical shape was then drawn on the skin incorporating the lesion and margins.  Incisions were then made along these lines to the appropriate tissue plane and the lesion was extirpated.
Primary Defect Length (In Cm): 0
Spiral Flap Text: The defect edges were debeveled with a #15 scalpel blade.  Given the location of the defect, shape of the defect and the proximity to free margins a spiral flap was deemed most appropriate.  Using a sterile surgical marker, an appropriate rotation flap was drawn incorporating the defect and placing the expected incisions within the relaxed skin tension lines where possible. The area thus outlined was incised deep to adipose tissue with a #15 scalpel blade.  The skin margins were undermined to an appropriate distance in all directions utilizing iris scissors.
Double Island Pedicle Flap Text: The defect edges were debeveled with a #15 scalpel blade.  Given the location of the defect, shape of the defect and the proximity to free margins a double island pedicle advancement flap was deemed most appropriate.  Using a sterile surgical marker, an appropriate advancement flap was drawn incorporating the defect, outlining the appropriate donor tissue and placing the expected incisions within the relaxed skin tension lines where possible.    The area thus outlined was incised deep to adipose tissue with a #15 scalpel blade.  The skin margins were undermined to an appropriate distance in all directions around the primary defect and laterally outward around the island pedicle utilizing iris scissors.  There was minimal undermining beneath the pedicle flap.
Mercedes Flap Text: The defect edges were debeveled with a #15 scalpel blade.  Given the location of the defect, shape of the defect and the proximity to free margins a Mercedes flap was deemed most appropriate.  Using a sterile surgical marker, an appropriate advancement flap was drawn incorporating the defect and placing the expected incisions within the relaxed skin tension lines where possible. The area thus outlined was incised deep to adipose tissue with a #15 scalpel blade.  The skin margins were undermined to an appropriate distance in all directions utilizing iris scissors.
Complex Repair And Rotation Flap Text: The defect edges were debeveled with a #15 scalpel blade.  The primary defect was closed partially with a complex linear closure.  Given the location of the remaining defect, shape of the defect and the proximity to free margins a rotation flap was deemed most appropriate for complete closure of the defect.  Using a sterile surgical marker, an appropriate advancement flap was drawn incorporating the defect and placing the expected incisions within the relaxed skin tension lines where possible.    The area thus outlined was incised deep to adipose tissue with a #15 scalpel blade.  The skin margins were undermined to an appropriate distance in all directions utilizing iris scissors.
Deep Sutures: 4-0 PGA
Mucosal Advancement Flap Text: Given the location of the defect, shape of the defect and the proximity to free margins a mucosal advancement flap was deemed most appropriate. Incisions were made with a 15 blade scalpel in the appropriate fashion along the cutaneous vermillion border and the mucosal lip. The remaining actinically damaged mucosal tissue was excised.  The mucosal advancement flap was then elevated to the gingival sulcus with care taken to preserve the neurovascular structures and advanced into the primary defect. Care was taken to ensure that precise realignment of the vermilion border was achieved.
Complex Repair And Transposition Flap Text: The defect edges were debeveled with a #15 scalpel blade.  The primary defect was closed partially with a complex linear closure.  Given the location of the remaining defect, shape of the defect and the proximity to free margins a transposition flap was deemed most appropriate for complete closure of the defect.  Using a sterile surgical marker, an appropriate advancement flap was drawn incorporating the defect and placing the expected incisions within the relaxed skin tension lines where possible.    The area thus outlined was incised deep to adipose tissue with a #15 scalpel blade.  The skin margins were undermined to an appropriate distance in all directions utilizing iris scissors.
Transposition Flap Text: The defect edges were debeveled with a #15 scalpel blade.  Given the location of the defect and the proximity to free margins a transposition flap was deemed most appropriate.  Using a sterile surgical marker, an appropriate transposition flap was drawn incorporating the defect.    The area thus outlined was incised deep to adipose tissue with a #15 scalpel blade.  The skin margins were undermined to an appropriate distance in all directions utilizing iris scissors.
Cartilage Graft Text: The defect edges were debeveled with a #15 scalpel blade.  Given the location of the defect, shape of the defect, the fact the defect involved a full thickness cartilage defect a cartilage graft was deemed most appropriate.  An appropriate donor site was identified, cleansed, and anesthetized. The cartilage graft was then harvested and transferred to the recipient site, oriented appropriately and then sutured into place.  The secondary defect was then repaired using a primary closure.
Slit Excision Additional Text (Leave Blank If You Do Not Want): A linear line was drawn on the skin overlying the lesion. An incision was made slowly until the lesion was visualized.  Once visualized, the lesion was removed with blunt dissection.
Complex Repair And Rhombic Flap Text: The defect edges were debeveled with a #15 scalpel blade.  The primary defect was closed partially with a complex linear closure.  Given the location of the remaining defect, shape of the defect and the proximity to free margins a rhombic flap was deemed most appropriate for complete closure of the defect.  Using a sterile surgical marker, an appropriate advancement flap was drawn incorporating the defect and placing the expected incisions within the relaxed skin tension lines where possible.    The area thus outlined was incised deep to adipose tissue with a #15 scalpel blade.  The skin margins were undermined to an appropriate distance in all directions utilizing iris scissors.
Split-Thickness Skin Graft Text: The defect edges were debeveled with a #15 scalpel blade.  Given the location of the defect, shape of the defect and the proximity to free margins a split thickness skin graft was deemed most appropriate.  Using a sterile surgical marker, the primary defect shape was transferred to the donor site. The split thickness graft was then harvested.  The skin graft was then placed in the primary defect and oriented appropriately.
Star Wedge Flap Text: The defect edges were debeveled with a #15 scalpel blade.  Given the location of the defect, shape of the defect and the proximity to free margins a star wedge flap was deemed most appropriate.  Using a sterile surgical marker, an appropriate rotation flap was drawn incorporating the defect and placing the expected incisions within the relaxed skin tension lines where possible. The area thus outlined was incised deep to adipose tissue with a #15 scalpel blade.  The skin margins were undermined to an appropriate distance in all directions utilizing iris scissors.
Modified Advancement Flap Text: The defect edges were debeveled with a #15 scalpel blade.  Given the location of the defect, shape of the defect and the proximity to free margins a modified advancement flap was deemed most appropriate.  Using a sterile surgical marker, an appropriate advancement flap was drawn incorporating the defect and placing the expected incisions within the relaxed skin tension lines where possible.    The area thus outlined was incised deep to adipose tissue with a #15 scalpel blade.  The skin margins were undermined to an appropriate distance in all directions utilizing iris scissors.
Wound Care: Bacitracin
Island Pedicle Flap-Requiring Vessel Identification Text: The defect edges were debeveled with a #15 scalpel blade.  Given the location of the defect, shape of the defect and the proximity to free margins an island pedicle advancement flap was deemed most appropriate.  Using a sterile surgical marker, an appropriate advancement flap was drawn, based on the axial vessel mentioned above, incorporating the defect, outlining the appropriate donor tissue and placing the expected incisions within the relaxed skin tension lines where possible.    The area thus outlined was incised deep to adipose tissue with a #15 scalpel blade.  The skin margins were undermined to an appropriate distance in all directions around the primary defect and laterally outward around the island pedicle utilizing iris scissors.  There was minimal undermining beneath the pedicle flap.
Dressing: steri-strips
Composite Graft Text: The defect edges were debeveled with a #15 scalpel blade.  Given the location of the defect, shape of the defect, the proximity to free margins and the fact the defect was full thickness a composite graft was deemed most appropriate.  The defect was outline and then transferred to the donor site.  A full thickness graft was then excised from the donor site. The graft was then placed in the primary defect, oriented appropriately and then sutured into place.  The secondary defect was then repaired using a primary closure.
Post-Care Instructions: I reviewed with the patient in detail post-care instructions:\\n1. Apply bacitracin over the steri-strips.  \\n2. Cut non-stick pad (Telfa) to cover the steri-strips\\n3. Apply tape (hypafix) over the non-stick pad\\n4. Change once per day for 5 days\\n5. Shower with bandage on, change bandage after shower\\n\\nPatient is not to engage in any heavy lifting, exercise, hot tub, or swimming for the next 14 days. Should the patient develop any fevers, chills, bleeding, severe pain patient will contact the office immediately.
Excisional Biopsy Additional Text (Leave Blank If You Do Not Want): The margin was drawn around the clinically apparent lesion. An elliptical shape was then drawn on the skin incorporating the lesion and margins.  Incisions were then made along these lines to the appropriate tissue plane and the lesion was extirpated.
Additional Anesthesia Volume In Cc: 6
O-T Plasty Text: The defect edges were debeveled with a #15 scalpel blade.  Given the location of the defect, shape of the defect and the proximity to free margins an O-T plasty was deemed most appropriate.  Using a sterile surgical marker, an appropriate O-T plasty was drawn incorporating the defect and placing the expected incisions within the relaxed skin tension lines where possible.    The area thus outlined was incised deep to adipose tissue with a #15 scalpel blade.  The skin margins were undermined to an appropriate distance in all directions utilizing iris scissors.
Muscle Hinge Flap Text: The defect edges were debeveled with a #15 scalpel blade.  Given the size, depth and location of the defect and the proximity to free margins a muscle hinge flap was deemed most appropriate.  Using a sterile surgical marker, an appropriate hinge flap was drawn incorporating the defect. The area thus outlined was incised with a #15 scalpel blade.  The skin margins were undermined to an appropriate distance in all directions utilizing iris scissors.
Complex Repair And V-Y Plasty Text: The defect edges were debeveled with a #15 scalpel blade.  The primary defect was closed partially with a complex linear closure.  Given the location of the remaining defect, shape of the defect and the proximity to free margins a V-Y plasty was deemed most appropriate for complete closure of the defect.  Using a sterile surgical marker, an appropriate advancement flap was drawn incorporating the defect and placing the expected incisions within the relaxed skin tension lines where possible.    The area thus outlined was incised deep to adipose tissue with a #15 scalpel blade.  The skin margins were undermined to an appropriate distance in all directions utilizing iris scissors.
Hatchet Flap Text: The defect edges were debeveled with a #15 scalpel blade.  Given the location of the defect, shape of the defect and the proximity to free margins a hatchet flap was deemed most appropriate.  Using a sterile surgical marker, an appropriate hatchet flap was drawn incorporating the defect and placing the expected incisions within the relaxed skin tension lines where possible.    The area thus outlined was incised deep to adipose tissue with a #15 scalpel blade.  The skin margins were undermined to an appropriate distance in all directions utilizing iris scissors.
Size Of Lesion In Cm: 1.1
Keystone Flap Text: The defect edges were debeveled with a #15 scalpel blade.  Given the location of the defect, shape of the defect a keystone flap was deemed most appropriate.  Using a sterile surgical marker, an appropriate keystone flap was drawn incorporating the defect, outlining the appropriate donor tissue and placing the expected incisions within the relaxed skin tension lines where possible. The area thus outlined was incised deep to adipose tissue with a #15 scalpel blade.  The skin margins were undermined to an appropriate distance in all directions around the primary defect and laterally outward around the flap utilizing iris scissors.
Size Of Margin In Cm: 0.2
Complex Repair And M Plasty Text: The defect edges were debeveled with a #15 scalpel blade.  The primary defect was closed partially with a complex linear closure.  Given the location of the remaining defect, shape of the defect and the proximity to free margins an M plasty was deemed most appropriate for complete closure of the defect.  Using a sterile surgical marker, an appropriate advancement flap was drawn incorporating the defect and placing the expected incisions within the relaxed skin tension lines where possible.    The area thus outlined was incised deep to adipose tissue with a #15 scalpel blade.  The skin margins were undermined to an appropriate distance in all directions utilizing iris scissors.
Melolabial Transposition Flap Text: The defect edges were debeveled with a #15 scalpel blade.  Given the location of the defect and the proximity to free margins a melolabial flap was deemed most appropriate.  Using a sterile surgical marker, an appropriate melolabial transposition flap was drawn incorporating the defect.    The area thus outlined was incised deep to adipose tissue with a #15 scalpel blade.  The skin margins were undermined to an appropriate distance in all directions utilizing iris scissors.
O-Z Plasty Text: The defect edges were debeveled with a #15 scalpel blade.  Given the location of the defect, shape of the defect and the proximity to free margins an O-Z plasty (double transposition flap) was deemed most appropriate.  Using a sterile surgical marker, the appropriate transposition flaps were drawn incorporating the defect and placing the expected incisions within the relaxed skin tension lines where possible.    The area thus outlined was incised deep to adipose tissue with a #15 scalpel blade.  The skin margins were undermined to an appropriate distance in all directions utilizing iris scissors.  Hemostasis was achieved with electrocautery.  The flaps were then transposed into place, one clockwise and the other counterclockwise, and anchored with interrupted buried subcutaneous sutures.
Perilesional Excision Additional Text (Leave Blank If You Do Not Want): The margin was drawn around the clinically apparent lesion. Incisions were then made along these lines to the appropriate tissue plane and the lesion was extirpated.
Home Suture Removal Text: Patient was provided a home suture removal kit and will remove their sutures at home.  If they have any questions or difficulties they will call the office.
Epidermal Autograft Text: The defect edges were debeveled with a #15 scalpel blade.  Given the location of the defect, shape of the defect and the proximity to free margins an epidermal autograft was deemed most appropriate.  Using a sterile surgical marker, the primary defect shape was transferred to the donor site. The epidermal graft was then harvested.  The skin graft was then placed in the primary defect and oriented appropriately.
Double O-Z Plasty Text: The defect edges were debeveled with a #15 scalpel blade.  Given the location of the defect, shape of the defect and the proximity to free margins a Double O-Z plasty (double transposition flap) was deemed most appropriate.  Using a sterile surgical marker, the appropriate transposition flaps were drawn incorporating the defect and placing the expected incisions within the relaxed skin tension lines where possible. The area thus outlined was incised deep to adipose tissue with a #15 scalpel blade.  The skin margins were undermined to an appropriate distance in all directions utilizing iris scissors.  Hemostasis was achieved with electrocautery.  The flaps were then transposed into place, one clockwise and the other counterclockwise, and anchored with interrupted buried subcutaneous sutures.
Where Do You Want The Question To Include Opioid Counseling Located?: Case Summary Tab
Epidermal Closure Graft Donor Site (Optional): simple interrupted
Complex Repair And Double M Plasty Text: The defect edges were debeveled with a #15 scalpel blade.  The primary defect was closed partially with a complex linear closure.  Given the location of the remaining defect, shape of the defect and the proximity to free margins a double M plasty was deemed most appropriate for complete closure of the defect.  Using a sterile surgical marker, an appropriate advancement flap was drawn incorporating the defect and placing the expected incisions within the relaxed skin tension lines where possible.    The area thus outlined was incised deep to adipose tissue with a #15 scalpel blade.  The skin margins were undermined to an appropriate distance in all directions utilizing iris scissors.
Rhombic Flap Text: The defect edges were debeveled with a #15 scalpel blade.  Given the location of the defect and the proximity to free margins a rhombic flap was deemed most appropriate.  Using a sterile surgical marker, an appropriate rhombic flap was drawn incorporating the defect.    The area thus outlined was incised deep to adipose tissue with a #15 scalpel blade.  The skin margins were undermined to an appropriate distance in all directions utilizing iris scissors.
Dermal Autograft Text: The defect edges were debeveled with a #15 scalpel blade.  Given the location of the defect, shape of the defect and the proximity to free margins a dermal autograft was deemed most appropriate.  Using a sterile surgical marker, the primary defect shape was transferred to the donor site. The area thus outlined was incised deep to adipose tissue with a #15 scalpel blade.  The harvested graft was then trimmed of adipose and epidermal tissue until only dermis was left.  The skin graft was then placed in the primary defect and oriented appropriately.
No Repair - Repaired With Adjacent Surgical Defect Text (Leave Blank If You Do Not Want): After the excision the defect was repaired concurrently with another surgical defect which was in close approximation.
Tissue Cultured Epidermal Autograft Text: The defect edges were debeveled with a #15 scalpel blade.  Given the location of the defect, shape of the defect and the proximity to free margins a tissue cultured epidermal autograft was deemed most appropriate.  The graft was then trimmed to fit the size of the defect.  The graft was then placed in the primary defect and oriented appropriately.
Skin Substitute Text: The defect edges were debeveled with a #15 scalpel blade.  Given the location of the defect, shape of the defect and the proximity to free margins a skin substitute graft was deemed most appropriate.  The graft material was trimmed to fit the size of the defect. The graft was then placed in the primary defect and oriented appropriately.
Hemostasis: Electrocautery
S Plasty Text: Given the location and shape of the defect, and the orientation of relaxed skin tension lines, an S-plasty was deemed most appropriate for repair.  Using a sterile surgical marker, the appropriate outline of the S-plasty was drawn, incorporating the defect and placing the expected incisions within the relaxed skin tension lines where possible.  The area thus outlined was incised deep to adipose tissue with a #15 scalpel blade.  The skin margins were undermined to an appropriate distance in all directions utilizing iris scissors. The skin flaps were advanced over the defect.  The opposing margins were then approximated with interrupted buried subcutaneous sutures.
Repair Performed By Another Provider Text (Leave Blank If You Do Not Want): After the tissue was excised the defect was repaired by another provider.
Rhomboid Transposition Flap Text: The defect edges were debeveled with a #15 scalpel blade.  Given the location of the defect and the proximity to free margins a rhomboid transposition flap was deemed most appropriate.  Using a sterile surgical marker, an appropriate rhomboid flap was drawn incorporating the defect.    The area thus outlined was incised deep to adipose tissue with a #15 scalpel blade.  The skin margins were undermined to an appropriate distance in all directions utilizing iris scissors.
Excision Method: Elliptical
Complex Repair And W Plasty Text: The defect edges were debeveled with a #15 scalpel blade.  The primary defect was closed partially with a complex linear closure.  Given the location of the remaining defect, shape of the defect and the proximity to free margins a W plasty was deemed most appropriate for complete closure of the defect.  Using a sterile surgical marker, an appropriate advancement flap was drawn incorporating the defect and placing the expected incisions within the relaxed skin tension lines where possible.    The area thus outlined was incised deep to adipose tissue with a #15 scalpel blade.  The skin margins were undermined to an appropriate distance in all directions utilizing iris scissors.
Dermal Closure: buried vertical mattress
Billing Type: Third-Party Bill
Complex Repair Preamble Text (Leave Blank If You Do Not Want): Extensive wide undermining was performed.
Complex Repair And Dorsal Nasal Flap Text: The defect edges were debeveled with a #15 scalpel blade.  The primary defect was closed partially with a complex linear closure.  Given the location of the remaining defect, shape of the defect and the proximity to free margins a dorsal nasal flap was deemed most appropriate for complete closure of the defect.  Using a sterile surgical marker, an appropriate flap was drawn incorporating the defect and placing the expected incisions within the relaxed skin tension lines where possible.    The area thus outlined was incised deep to adipose tissue with a #15 scalpel blade.  The skin margins were undermined to an appropriate distance in all directions utilizing iris scissors.
Detail Level: Detailed
Advancement Flap (Single) Text: The defect edges were debeveled with a #15 scalpel blade.  Given the location of the defect and the proximity to free margins a single advancement flap was deemed most appropriate.  Using a sterile surgical marker, an appropriate advancement flap was drawn incorporating the defect and placing the expected incisions within the relaxed skin tension lines where possible.    The area thus outlined was incised deep to adipose tissue with a #15 scalpel blade.  The skin margins were undermined to an appropriate distance in all directions utilizing iris scissors.
Xenograft Text: The defect edges were debeveled with a #15 scalpel blade.  Given the location of the defect, shape of the defect and the proximity to free margins a xenograft was deemed most appropriate.  The graft was then trimmed to fit the size of the defect.  The graft was then placed in the primary defect and oriented appropriately.
Helical Rim Advancement Flap Text: The defect edges were debeveled with a #15 blade scalpel.  Given the location of the defect and the proximity to free margins (helical rim) a double helical rim advancement flap was deemed most appropriate.  Using a sterile surgical marker, the appropriate advancement flaps were drawn incorporating the defect and placing the expected incisions between the helical rim and antihelix where possible.  The area thus outlined was incised through and through with a #15 scalpel blade.  With a skin hook and iris scissors, the flaps were gently and sharply undermined and freed up.
Bi-Rhombic Flap Text: The defect edges were debeveled with a #15 scalpel blade.  Given the location of the defect and the proximity to free margins a bi-rhombic flap was deemed most appropriate.  Using a sterile surgical marker, an appropriate rhombic flap was drawn incorporating the defect. The area thus outlined was incised deep to adipose tissue with a #15 scalpel blade.  The skin margins were undermined to an appropriate distance in all directions utilizing iris scissors.
Graft Donor Site Bandage (Optional-Leave Blank If You Don't Want In Note): Steri-strips and a pressure bandage were applied to the donor site.
V-Y Plasty Text: The defect edges were debeveled with a #15 scalpel blade.  Given the location of the defect, shape of the defect and the proximity to free margins an V-Y advancement flap was deemed most appropriate.  Using a sterile surgical marker, an appropriate advancement flap was drawn incorporating the defect and placing the expected incisions within the relaxed skin tension lines where possible.    The area thus outlined was incised deep to adipose tissue with a #15 scalpel blade.  The skin margins were undermined to an appropriate distance in all directions utilizing iris scissors.
Complex Repair And Z Plasty Text: The defect edges were debeveled with a #15 scalpel blade.  The primary defect was closed partially with a complex linear closure.  Given the location of the remaining defect, shape of the defect and the proximity to free margins a Z plasty was deemed most appropriate for complete closure of the defect.  Using a sterile surgical marker, an appropriate advancement flap was drawn incorporating the defect and placing the expected incisions within the relaxed skin tension lines where possible.    The area thus outlined was incised deep to adipose tissue with a #15 scalpel blade.  The skin margins were undermined to an appropriate distance in all directions utilizing iris scissors.
Purse String (Intermediate) Text: Given the location of the defect and the characteristics of the surrounding skin a purse string intermediate closure was deemed most appropriate.  Undermining was performed circumferentially around the surgical defect.  A purse string suture was then placed and tightened.
Information: Selecting Yes will display possible errors in your note based on the variables you have selected. This validation is only offered as a suggestion for you. PLEASE NOTE THAT THE VALIDATION TEXT WILL BE REMOVED WHEN YOU FINALIZE YOUR NOTE. IF YOU WANT TO FAX A PRELIMINARY NOTE YOU WILL NEED TO TOGGLE THIS TO 'NO' IF YOU DO NOT WANT IT IN YOUR FAXED NOTE.
W Plasty Text: The lesion was extirpated to the level of the fat with a #15 scalpel blade.  Given the location of the defect, shape of the defect and the proximity to free margins a W-plasty was deemed most appropriate for repair.  Using a sterile surgical marker, the appropriate transposition arms of the W-plasty were drawn incorporating the defect and placing the expected incisions within the relaxed skin tension lines where possible.    The area thus outlined was incised deep to adipose tissue with a #15 scalpel blade.  The skin margins were undermined to an appropriate distance in all directions utilizing iris scissors.  The opposing transposition arms were then transposed into place in opposite direction and anchored with interrupted buried subcutaneous sutures.
Advancement Flap (Double) Text: The defect edges were debeveled with a #15 scalpel blade.  Given the location of the defect and the proximity to free margins a double advancement flap was deemed most appropriate.  Using a sterile surgical marker, the appropriate advancement flaps were drawn incorporating the defect and placing the expected incisions within the relaxed skin tension lines where possible.    The area thus outlined was incised deep to adipose tissue with a #15 scalpel blade.  The skin margins were undermined to an appropriate distance in all directions utilizing iris scissors.
Bilateral Helical Rim Advancement Flap Text: The defect edges were debeveled with a #15 blade scalpel.  Given the location of the defect and the proximity to free margins (helical rim) a bilateral helical rim advancement flap was deemed most appropriate.  Using a sterile surgical marker, the appropriate advancement flaps were drawn incorporating the defect and placing the expected incisions between the helical rim and antihelix where possible.  The area thus outlined was incised through and through with a #15 scalpel blade.  With a skin hook and iris scissors, the flaps were gently and sharply undermined and freed up.
Complex Repair And Ftsg Text: The defect edges were debeveled with a #15 scalpel blade.  The primary defect was closed partially with a complex linear closure.  Given the location of the defect, shape of the defect and the proximity to free margins a full thickness skin graft was deemed most appropriate to repair the remaining defect.  The graft was trimmed to fit the size of the remaining defect.  The graft was then placed in the primary defect, oriented appropriately, and sutured into place.
Medical Necessity Clause: This procedure was medically necessary because the lesion that was treated was:
H Plasty Text: Given the location of the defect, shape of the defect and the proximity to free margins a H-plasty was deemed most appropriate for repair.  Using a sterile surgical marker, the appropriate advancement arms of the H-plasty were drawn incorporating the defect and placing the expected incisions within the relaxed skin tension lines where possible. The area thus outlined was incised deep to adipose tissue with a #15 scalpel blade. The skin margins were undermined to an appropriate distance in all directions utilizing iris scissors.  The opposing advancement arms were then advanced into place in opposite direction and anchored with interrupted buried subcutaneous sutures.
Estimated Blood Loss (Cc): minimal
Repair Type: Complex
Z Plasty Text: The lesion was extirpated to the level of the fat with a #15 scalpel blade.  Given the location of the defect, shape of the defect and the proximity to free margins a Z-plasty was deemed most appropriate for repair.  Using a sterile surgical marker, the appropriate transposition arms of the Z-plasty were drawn incorporating the defect and placing the expected incisions within the relaxed skin tension lines where possible.    The area thus outlined was incised deep to adipose tissue with a #15 scalpel blade.  The skin margins were undermined to an appropriate distance in all directions utilizing iris scissors.  The opposing transposition arms were then transposed into place in opposite direction and anchored with interrupted buried subcutaneous sutures.
Excision Depth: adipose tissue
Ear Star Wedge Flap Text: The defect edges were debeveled with a #15 blade scalpel.  Given the location of the defect and the proximity to free margins (helical rim) an ear star wedge flap was deemed most appropriate.  Using a sterile surgical marker, the appropriate flap was drawn incorporating the defect and placing the expected incisions between the helical rim and antihelix where possible.  The area thus outlined was incised through and through with a #15 scalpel blade.
Complex Repair And Split-Thickness Skin Graft Text: The defect edges were debeveled with a #15 scalpel blade.  The primary defect was closed partially with a complex linear closure.  Given the location of the defect, shape of the defect and the proximity to free margins a split thickness skin graft was deemed most appropriate to repair the remaining defect.  The graft was trimmed to fit the size of the remaining defect.  The graft was then placed in the primary defect, oriented appropriately, and sutured into place.
Lab Facility: 
Purse String (Simple) Text: Given the location of the defect and the characteristics of the surrounding skin a purse string simple closure was deemed most appropriate.  Undermining was performed circumferentially around the surgical defect.  A purse string suture was then placed and tightened.
Burow's Advancement Flap Text: The defect edges were debeveled with a #15 scalpel blade.  Given the location of the defect and the proximity to free margins a Burow's advancement flap was deemed most appropriate.  Using a sterile surgical marker, the appropriate advancement flap was drawn incorporating the defect and placing the expected incisions within the relaxed skin tension lines where possible.    The area thus outlined was incised deep to adipose tissue with a #15 scalpel blade.  The skin margins were undermined to an appropriate distance in all directions utilizing iris scissors.
Lab: [id number]
Epidermal Sutures: 5-0 Caprosyn
Complex Repair And Single Advancement Flap Text: The defect edges were debeveled with a #15 scalpel blade.  The primary defect was closed partially with a complex linear closure.  Given the location of the remaining defect, shape of the defect and the proximity to free margins a single advancement flap was deemed most appropriate for complete closure of the defect.  Using a sterile surgical marker, an appropriate advancement flap was drawn incorporating the defect and placing the expected incisions within the relaxed skin tension lines where possible.    The area thus outlined was incised deep to adipose tissue with a #15 scalpel blade.  The skin margins were undermined to an appropriate distance in all directions utilizing iris scissors.
Cheek Interpolation Flap Text: A decision was made to reconstruct the defect utilizing an interpolation axial flap and a staged reconstruction.  A telfa template was made of the defect.  This telfa template was then used to outline the Cheek Interpolation flap.  The donor area for the pedicle flap was then injected with anesthesia.  The flap was excised through the skin and subcutaneous tissue down to the layer of the underlying musculature.  The interpolation flap was carefully excised within this deep plane to maintain its blood supply.  The edges of the donor site were undermined.   The donor site was closed in a primary fashion.  The pedicle was then rotated into position and sutured.  Once the tube was sutured into place, adequate blood supply was confirmed with blanching and refill.  The pedicle was then wrapped with xeroform gauze and dressed appropriately with a telfa and gauze bandage to ensure continued blood supply and protect the attached pedicle.
Crescentic Advancement Flap Text: The defect edges were debeveled with a #15 scalpel blade.  Given the location of the defect and the proximity to free margins a crescentic advancement flap was deemed most appropriate.  Using a sterile surgical marker, the appropriate advancement flap was drawn incorporating the defect and placing the expected incisions within the relaxed skin tension lines where possible.    The area thus outlined was incised deep to adipose tissue with a #15 scalpel blade.  The skin margins were undermined to an appropriate distance in all directions utilizing iris scissors.
Complex Repair And Epidermal Autograft Text: The defect edges were debeveled with a #15 scalpel blade.  The primary defect was closed partially with a complex linear closure.  Given the location of the defect, shape of the defect and the proximity to free margins an epidermal autograft was deemed most appropriate to repair the remaining defect.  The graft was trimmed to fit the size of the remaining defect.  The graft was then placed in the primary defect, oriented appropriately, and sutured into place.
Banner Transposition Flap Text: The defect edges were debeveled with a #15 scalpel blade.  Given the location of the defect and the proximity to free margins a Banner transposition flap was deemed most appropriate.  Using a sterile surgical marker, an appropriate flap drawn around the defect. The area thus outlined was incised deep to adipose tissue with a #15 scalpel blade.  The skin margins were undermined to an appropriate distance in all directions utilizing iris scissors.
Chonodrocutaneous Helical Advancement Flap Text: The defect edges were debeveled with a #15 scalpel blade.  Given the location of the defect and the proximity to free margins a chondrocutaneous helical advancement flap was deemed most appropriate.  Using a sterile surgical marker, the appropriate advancement flap was drawn incorporating the defect and placing the expected incisions within the relaxed skin tension lines where possible.    The area thus outlined was incised deep to adipose tissue with a #15 scalpel blade.  The skin margins were undermined to an appropriate distance in all directions utilizing iris scissors.
Intermediate / Complex Repair - Final Wound Length In Cm: 2.7
Complex Repair And Double Advancement Flap Text: The defect edges were debeveled with a #15 scalpel blade.  The primary defect was closed partially with a complex linear closure.  Given the location of the remaining defect, shape of the defect and the proximity to free margins a double advancement flap was deemed most appropriate for complete closure of the defect.  Using a sterile surgical marker, an appropriate advancement flap was drawn incorporating the defect and placing the expected incisions within the relaxed skin tension lines where possible.    The area thus outlined was incised deep to adipose tissue with a #15 scalpel blade.  The skin margins were undermined to an appropriate distance in all directions utilizing iris scissors.
Complex Repair And Dermal Autograft Text: The defect edges were debeveled with a #15 scalpel blade.  The primary defect was closed partially with a complex linear closure.  Given the location of the defect, shape of the defect and the proximity to free margins an dermal autograft was deemed most appropriate to repair the remaining defect.  The graft was trimmed to fit the size of the remaining defect.  The graft was then placed in the primary defect, oriented appropriately, and sutured into place.
Cheek-To-Nose Interpolation Flap Text: A decision was made to reconstruct the defect utilizing an interpolation axial flap and a staged reconstruction.  A telfa template was made of the defect.  This telfa template was then used to outline the Cheek-To-Nose Interpolation flap.  The donor area for the pedicle flap was then injected with anesthesia.  The flap was excised through the skin and subcutaneous tissue down to the layer of the underlying musculature.  The interpolation flap was carefully excised within this deep plane to maintain its blood supply.  The edges of the donor site were undermined.   The donor site was closed in a primary fashion.  The pedicle was then rotated into position and sutured.  Once the tube was sutured into place, adequate blood supply was confirmed with blanching and refill.  The pedicle was then wrapped with xeroform gauze and dressed appropriately with a telfa and gauze bandage to ensure continued blood supply and protect the attached pedicle.
Bilobed Flap Text: The defect edges were debeveled with a #15 scalpel blade.  Given the location of the defect and the proximity to free margins a bilobe flap was deemed most appropriate.  Using a sterile surgical marker, an appropriate bilobe flap drawn around the defect.    The area thus outlined was incised deep to adipose tissue with a #15 scalpel blade.  The skin margins were undermined to an appropriate distance in all directions utilizing iris scissors.
Scalpel Size: 15 blade
Trilobed Flap Text: The defect edges were debeveled with a #15 scalpel blade.  Given the location of the defect and the proximity to free margins a trilobed flap was deemed most appropriate.  Using a sterile surgical marker, an appropriate trilobed flap drawn around the defect.    The area thus outlined was incised deep to adipose tissue with a #15 scalpel blade.  The skin margins were undermined to an appropriate distance in all directions utilizing iris scissors.
Complex Repair And Xenograft Text: The defect edges were debeveled with a #15 scalpel blade.  The primary defect was closed partially with a complex linear closure.  Given the location of the defect, shape of the defect and the proximity to free margins a xenograft was deemed most appropriate to repair the remaining defect.  The graft was trimmed to fit the size of the remaining defect.  The graft was then placed in the primary defect, oriented appropriately, and sutured into place.
Interpolation Flap Text: A decision was made to reconstruct the defect utilizing an interpolation axial flap and a staged reconstruction.  A telfa template was made of the defect.  This telfa template was then used to outline the interpolation flap.  The donor area for the pedicle flap was then injected with anesthesia.  The flap was excised through the skin and subcutaneous tissue down to the layer of the underlying musculature.  The interpolation flap was carefully excised within this deep plane to maintain its blood supply.  The edges of the donor site were undermined.   The donor site was closed in a primary fashion.  The pedicle was then rotated into position and sutured.  Once the tube was sutured into place, adequate blood supply was confirmed with blanching and refill.  The pedicle was then wrapped with xeroform gauze and dressed appropriately with a telfa and gauze bandage to ensure continued blood supply and protect the attached pedicle.
Bilobed Transposition Flap Text: The defect edges were debeveled with a #15 scalpel blade.  Given the location of the defect and the proximity to free margins a bilobed transposition flap was deemed most appropriate.  Using a sterile surgical marker, an appropriate bilobe flap drawn around the defect.    The area thus outlined was incised deep to adipose tissue with a #15 scalpel blade.  The skin margins were undermined to an appropriate distance in all directions utilizing iris scissors.
Complex Repair And Tissue Cultured Epidermal Autograft Text: The defect edges were debeveled with a #15 scalpel blade.  The primary defect was closed partially with a complex linear closure.  Given the location of the defect, shape of the defect and the proximity to free margins an tissue cultured epidermal autograft was deemed most appropriate to repair the remaining defect.  The graft was trimmed to fit the size of the remaining defect.  The graft was then placed in the primary defect, oriented appropriately, and sutured into place.
Complex Repair And Modified Advancement Flap Text: The defect edges were debeveled with a #15 scalpel blade.  The primary defect was closed partially with a complex linear closure.  Given the location of the remaining defect, shape of the defect and the proximity to free margins a modified advancement flap was deemed most appropriate for complete closure of the defect.  Using a sterile surgical marker, an appropriate advancement flap was drawn incorporating the defect and placing the expected incisions within the relaxed skin tension lines where possible.    The area thus outlined was incised deep to adipose tissue with a #15 scalpel blade.  The skin margins were undermined to an appropriate distance in all directions utilizing iris scissors.
A-T Advancement Flap Text: The defect edges were debeveled with a #15 scalpel blade.  Given the location of the defect, shape of the defect and the proximity to free margins an A-T advancement flap was deemed most appropriate.  Using a sterile surgical marker, an appropriate advancement flap was drawn incorporating the defect and placing the expected incisions within the relaxed skin tension lines where possible.    The area thus outlined was incised deep to adipose tissue with a #15 scalpel blade.  The skin margins were undermined to an appropriate distance in all directions utilizing iris scissors.

## 2019-12-17 ENCOUNTER — OFFICE VISIT (OUTPATIENT)
Dept: HEALTH INFORMATION MANAGEMENT | Facility: MEDICAL CENTER | Age: 54
End: 2019-12-17
Payer: COMMERCIAL

## 2019-12-17 VITALS
BODY MASS INDEX: 39.9 KG/M2 | OXYGEN SATURATION: 95 % | WEIGHT: 239.5 LBS | HEIGHT: 65 IN | SYSTOLIC BLOOD PRESSURE: 124 MMHG | DIASTOLIC BLOOD PRESSURE: 80 MMHG | HEART RATE: 97 BPM

## 2019-12-17 DIAGNOSIS — E78.5 HYPERLIPIDEMIA, UNSPECIFIED HYPERLIPIDEMIA TYPE: ICD-10-CM

## 2019-12-17 DIAGNOSIS — E66.01 MORBID OBESITY WITH BMI OF 40.0-44.9, ADULT (HCC): ICD-10-CM

## 2019-12-17 DIAGNOSIS — R73.01 ELEVATED FASTING GLUCOSE: ICD-10-CM

## 2019-12-17 DIAGNOSIS — I10 ESSENTIAL HYPERTENSION: ICD-10-CM

## 2019-12-17 DIAGNOSIS — F32.9 REACTIVE DEPRESSION: ICD-10-CM

## 2019-12-17 DIAGNOSIS — R53.82 CHRONIC FATIGUE: ICD-10-CM

## 2019-12-17 DIAGNOSIS — E88.810 METABOLIC SYNDROME: ICD-10-CM

## 2019-12-17 PROCEDURE — 99214 OFFICE O/P EST MOD 30 MIN: CPT | Performed by: INTERNAL MEDICINE

## 2019-12-17 PROCEDURE — 97803 MED NUTRITION INDIV SUBSEQ: CPT | Performed by: DIETITIAN, REGISTERED

## 2019-12-17 NOTE — PROGRESS NOTES
Bariatric Medicine Follow Up  Chief Complaint   Patient presents with   • Weight Gain       History of Present Illness:   Vicky Nino is a 54 y.o. female who presents for weight management follow-up and to help address co-morbidities caused by overweight, as below.    During the patient's last visit, the following were discussed and recommended:  Weight Goal: 5% wt loss at one month after start (pt goal weight is 150 lb)  Diet:     MR: Consider new direction soup for 1 meal daily instead of skipping  High Protein/Low Carb Meals and 2 snacks between meals daily  >100 g protein, <100 g total carbs daily, around 1600 kcal per day to start  Track daily intake with My Fitness Pal, bring to next visit  64+ oz water per day  Avoid excessive snacking  Physical Activity: Resume fitness connection membership, gym [employer]  Risk level for moderate/vigorous exercise program:   Moderate given chronic knee pain, has had PT  New Rx:   Consider anti-obesity medication pending course  Patient declines antidepressant but consider Wellbutrin  Behavior change:   Increase activity level, start tracking    Wants meal plan ideas, does not know what to make for healthier food choices.  Did buy PP shakes.  Not using consistently.  Skipping breakfast.  Fasting from 6:30p to 10:30 a.  Hungry at night.  Tracking around 1200 kcal/d but feels she is supposed to eat up to 7588-9783 kcal/d.  She wonders what she can do differently to improve her weight loss.    Depression: Not on antidepressant currently  I FG: Not on glucose lowering agent, with updated labs pending  HTN: Blood pressure controlled on amlodipine  HLD: Not on statin or fenofibrate, with updated labs pending    Exercise:   10K steps per day, tracking on iWatch     Review of Systems   Denies lightheadedness, weakness, diaphoresis, change in bowel movements  All other ROS were reviewed and are otherwise unchanged from my previous visit with patient.    Physical Exam:    /80  "(BP Location: Left arm, Patient Position: Sitting, BP Cuff Size: Large adult long)   Pulse 97   Ht 1.651 m (5' 5\")   Wt 108.6 kg (239 lb 8 oz)   LMP 09/18/2013   SpO2 95%   BMI 39.85 kg/m²   Waist Measurement   Waist: 44.75 inch/inches  Weight change since last visit: -1 lb   Waist Circum change since last visit: -0.25 in     Constitutional: Oriented to person, place, and time and well-developed, well-nourished, and in no distress.    Head: Normocephalic.   Musculoskeletal: Normal range of motion. No edema.   Neurological: Alert and oriented to person, place, and time. No muscle weakness.  Gait normal.   Skin: Warm and dry. Not diaphoretic.   Psychiatric: Mood, memory, affect and judgment normal.     Laboratory:   Recent labs reviewed.      Dietitian Assessment: I have reviewed the Dietitian's assessment related to this encounter.     ASSESSMENT/PLAN:  Body mass index is 39.85 kg/m².    Obesity Stage ([place]): 1; Class 2    1. Morbid obesity with BMI of 40.0-44.9, adult ([facility])     2. Metabolic syndrome     3. Chronic fatigue     4. Reactive depression     5. Elevated fasting glucose     6. Essential hypertension     7. Hyperlipidemia, unspecified hyperlipidemia type       The patient has begun to make some positive changes, needs to focus more on timing of meals and food choice, then can work on increasing activity level.  Declines antidepressant, which may help lift her mood and motivate her with lifestyle change.  Consider metformin given I FG.  Blood pressure currently controlled.  Monitor lipids, blood pressure, fatigue level, all of which should improve with weight loss.    The patient and I have discussed at length and agree to the following recommendations, which are all addressing the above diagnoses:    Weight Goal: 3-5% wt loss each month (pt goal weight is 150 lb)  Diet: PP qam, midafternoon  Oatmeal 1-2/wk in a.m. instead of PP  Lean protein/[facility] for L, D.  Keep meal simple!  Avoid skipping " breakfast  Okay to fast after 6:30 PM until early a.m. but not beyond  Physical Activity:  Walking 10K steps/d  Rx: Consider anti-obesity medication pending course  Behavior change: Focus on diet changes as above, with better planning  Follow-up: 1 mo    Patient's body mass index is 39.85 kg/m². Exercise and nutrition counseling were performed at this visit.

## 2019-12-18 NOTE — PROGRESS NOTES
"Nutrition Reassess: Medical Weight Management   Today's date: 12/17/2019  Referring Provider: No ref. provider found      Time: in/out 3:17-3:40pm  Visit#: 3    Subjective:  - finds herself tracking closer to 1200kcal (rec 1500kcal)   - had step tracker on which was adding kcal previously but still usually under goal  - PP for lunch usually  - eats 'Just a Handful' nuts from  [employer]  - likes to snack on higher fat options  - having difficulty getting veggies in     - wondering if she can have more shakes      Anthropometrics/Objective  Today's weight:    Vitals:    12/17/19 1501   BP: 124/80   Weight: 108.6 kg (239 lb 8 oz)   Height: 1.651 m (5' 5\")     BMI:  Body mass index is 39.85 kg/m².  Starting weight/date 240.4 lbs    Total weight change : -0.9 lbs           Meal Plan:   High protein low carb with 1-2 meal replacements per day     ReAssesment/Notes:    Vicky has been working towards her weight loss goals. She has been tracking with [facility]. She previously had activity trackers on within the [facility]ony which we turned off at previous visit but still finds herself tracking around 1200kcal on average. We discussed that may be fine for now but long term that may be too low, especially when she starts adding in more activity. She currently is using a PP MR for lunch. Tends to use nuts as a snack but feels her portions may be larger than general portion sizes. We discussed fat portions and provided Vicky with the Plate Planner as a great way to refresh serving sizes. We discussed adding the shakes as a possibility for extra snacks or replacement for high fat options (nuts and avocados) if necessary. Vicky wanted to know more ways to incorporate more veggies into her cooking routine. Provided the meal ideas handout emphasizing the websites provided at the bottom of the handout as well as recommended checking out meal delivery services that give you access to their recipes without having to actually buy the box (I.e blue " danica rao,). These can provide her with ideas to get different techniques for veggies in, just be mindful of the fats that may be added to these recipes. Also provided Vicky with the Holiday Tips handout at this visit.  May be of benefit to go over nutrition basics, nut label, and fiber at subsequent visits.     Goals:  1. MR for lunch (and snacks if you would like)  2. Familiarize self with fat servings: split avocado into 4 or 1/4 cup of nuts  3. Get more veggies in - try meal delivery websites for recipes but be mindful of added fats    Follow-up: 4-6 weeks MD/JAQUELIN

## 2020-01-29 ENCOUNTER — OFFICE VISIT (OUTPATIENT)
Dept: HEALTH INFORMATION MANAGEMENT | Facility: MEDICAL CENTER | Age: 55
End: 2020-01-29
Payer: COMMERCIAL

## 2020-01-29 VITALS
HEIGHT: 65 IN | WEIGHT: 239.8 LBS | SYSTOLIC BLOOD PRESSURE: 126 MMHG | BODY MASS INDEX: 39.95 KG/M2 | DIASTOLIC BLOOD PRESSURE: 78 MMHG | HEART RATE: 87 BPM | OXYGEN SATURATION: 95 %

## 2020-01-29 DIAGNOSIS — E66.01 MORBID OBESITY WITH BMI OF 40.0-44.9, ADULT (HCC): ICD-10-CM

## 2020-01-29 DIAGNOSIS — E78.5 HYPERLIPIDEMIA, UNSPECIFIED HYPERLIPIDEMIA TYPE: ICD-10-CM

## 2020-01-29 DIAGNOSIS — E88.810 METABOLIC SYNDROME: ICD-10-CM

## 2020-01-29 DIAGNOSIS — R73.01 ELEVATED FASTING GLUCOSE: ICD-10-CM

## 2020-01-29 DIAGNOSIS — F32.9 REACTIVE DEPRESSION: ICD-10-CM

## 2020-01-29 DIAGNOSIS — R53.82 CHRONIC FATIGUE: ICD-10-CM

## 2020-01-29 DIAGNOSIS — I10 ESSENTIAL HYPERTENSION: ICD-10-CM

## 2020-01-29 PROCEDURE — 99214 OFFICE O/P EST MOD 30 MIN: CPT | Performed by: INTERNAL MEDICINE

## 2020-01-29 PROCEDURE — 97803 MED NUTRITION INDIV SUBSEQ: CPT | Performed by: DIETITIAN, REGISTERED

## 2020-01-29 ASSESSMENT — PATIENT HEALTH QUESTIONNAIRE - PHQ9: CLINICAL INTERPRETATION OF PHQ2 SCORE: 0

## 2020-01-30 NOTE — PROGRESS NOTES
"Nutrition Reassess: Medical Weight Management   Today's date: 1/29/2020  Referring Provider: No ref. provider found      Time: in/out 4:39-5:09 PM  Visit#: 4    Subjective:  - using PP MR shake or PP protein bar as a MR  - has been eating more protein and feels better  - has a goal for 2020 to walk 10,000 steps per day  - tracking sometimes  - having a 'dry January' and not drinking any alcohol  - eating more canned vegetables    Diet history:   Breakfast - PP MR shake or PP bar or 1/4 cup oatmeal, nuts, 1/2 cup berries, flax seeds plain Greek yogurt  Snack - 0  Lunch - Salad with protein, mixed veggies, avocado, oil  Snack - apple and nuts or PP bar or PP MR shake  Dinner - Chicken Marsala, green beans, mashed potatoes (1 cup)    Anthropometrics/Objective  Today's weight:    Vitals:    01/29/20 1617   BP: 126/78   Weight: 108.8 kg (239 lb 12.8 oz)   Height: 1.651 m (5' 5\")     BMI:  Body mass index is 39.9 kg/m².  Starting weight/date 240.4 lb (11/18/19)   Total weight change : -0.6 lb            Meal Plan:   High protein, low carb  with 1-2 meal replacements per day     ReAssesment/Notes:  Vicky is doing well and has been focusing on her portion sizes at meals and for snacks. She is eating smaller portions of nuts and avocados for snacks. We reviewed nutrition basics for a better understanding of carbohydrates, proteins and fats. Suggested for her to choose the low-sodium canned veggies. Pt set her goals to start doing resistance training 2 times per week and to eat more non-starchy veggies with meals. Next visit suggest to review food labels.     Follow-up: 1 month with JAQUELIN/MD    "

## 2020-01-30 NOTE — PROGRESS NOTES
"Bariatric Medicine Follow Up  Chief Complaint   Patient presents with   • Weight Gain       History of Present Illness:   Vicky Nino is a 54 y.o. female who presents for weight management follow-up and to help address co-morbidities caused by overweight, as below.    During the patient's last visit, the following were discussed and recommended:  Weight Goal: 3-5% wt loss each month (pt goal weight is 150 lb)  Diet: PP qam, midafternoon  Oatmeal 1-2/wk in a.m. instead of PP  Lean protein/[facility] for L, D.  Keep meal simple!  Avoid skipping breakfast  Okay to fast after 6:30 PM until early a.m. but not beyond  Physical Activity:  Walking 10K steps/d  Rx: Consider anti-obesity medication pending course  Behavior change: Focus on diet changes as above, with better planning    She is trying to reduce refined carb intake, measuring more.  Has been participating with friends in wt loss challenges, more active.  Having 1 PP per day or oatmeal with berries/flax or premier protein bar in am.  Not drinking this month of January, happier with that.    She feels much better, thinks more protein intake helps but eating less meat, more vegetables.  Wants to make lifelong change, less brain fog!    I FG: Not currently on metformin or other glucose lowering agent  HTN: Blood pressure controlled on amlodipine  HLD: Not on statin or fenofibrate  Depression: Mood stable, not requiring antidepressant at this time    Exercise:   Adding pushups and planks this month     Review of Systems   Denies lightheadedness, worsening fatigue, excess cravings, weakness  All other ROS were reviewed and are otherwise unchanged from my previous visit with patient.    Physical Exam:    /78 (BP Location: Left arm, Patient Position: Sitting, BP Cuff Size: Large adult)   Pulse 87   Ht 1.651 m (5' 5\")   Wt 108.8 kg (239 lb 12.8 oz)   LMP 09/18/2013   SpO2 95%   BMI 39.90 kg/m²   Waist Measurement   Waist: 43.5 inch/inches  Weight change " since last visit: 0 lb (-1 lb total)  Waist Circum change since last visit: -1 in (-1.5 in total)    Constitutional: Oriented to person, place, and time and well-developed, well-nourished, and in no distress.    Head: Normocephalic.   Musculoskeletal: Normal range of motion. No edema.   Neurological: Alert and oriented to person, place, and time. No muscle weakness.  Gait normal.   Skin: Warm and dry. Not diaphoretic.   Psychiatric: Mood, memory, affect and judgment normal.     Laboratory:   Recent labs reviewed.  Update pending.      Dietitian Assessment: I have reviewed the Dietitian's assessment related to this encounter.     ASSESSMENT/PLAN:  Body mass index is 39.9 kg/m².    Obesity Stage ([place]):  1; Class 2    1. Morbid obesity with BMI of 40.0-44.9, adult ([facility])     2. Metabolic syndrome     3. Chronic fatigue     4. Reactive depression     5. Hyperlipidemia, unspecified hyperlipidemia type     6. Elevated fasting glucose     7. Essential hypertension       The patient has begun to make some significant lifestyle change, is increase her activity level, with improvement in skeletal muscle mass and waist circumference.  Fatigue and mood quite stable.  She has a very positive outlook.  Blood pressure, fasting glucose, lipids will improve with weight loss, will continue to follow.  Continue increasing activity as she is doing.    The patient and I have discussed at length and agree to the following recommendations, which are all addressing the above diagnoses:    Weight Goal: 3-5% wt loss each month (pt goal weight is 150 lb)  Diet: PP once daily   Meal prep as she has been doing  She is looking for new low carb recipes  Track calories to avoid undereating, excess CHO as she is  Physical Activity:  Move challenges with friends, dog walking, 10K steps per day  Rx: Patient declines  Behavior change: Has friends for support group, hold her accountable  Follow-up: 1 mo  Labs due next mo as ordered by  PCP    Patient's body mass index is 39.9 kg/m². Exercise and nutrition counseling were performed at this visit.

## 2020-03-15 DIAGNOSIS — I10 ESSENTIAL HYPERTENSION: ICD-10-CM

## 2020-03-16 RX ORDER — AMLODIPINE BESYLATE 2.5 MG/1
TABLET ORAL
Qty: 90 TAB | Refills: 1 | Status: SHIPPED | OUTPATIENT
Start: 2020-03-16 | End: 2020-10-06

## 2020-03-16 NOTE — TELEPHONE ENCOUNTER
Received request via: Pharmacy    Was the patient seen in the last year in this department? Yes11/18/19    Does the patient have an active prescription (recently filled or refills available) for medication(s) requested? No

## 2020-03-18 ENCOUNTER — APPOINTMENT (OUTPATIENT)
Dept: HEALTH INFORMATION MANAGEMENT | Facility: MEDICAL CENTER | Age: 55
End: 2020-03-18
Payer: COMMERCIAL

## 2020-04-06 RX ORDER — ALBUTEROL SULFATE 90 UG/1
2 AEROSOL, METERED RESPIRATORY (INHALATION) EVERY 6 HOURS PRN
Qty: 8.5 INHALER | Refills: 1 | Status: SHIPPED | OUTPATIENT
Start: 2020-04-06

## 2020-08-05 ENCOUNTER — TELEMEDICINE (OUTPATIENT)
Dept: MEDICAL GROUP | Facility: LAB | Age: 55
End: 2020-08-05
Payer: COMMERCIAL

## 2020-08-05 ENCOUNTER — NON-PROVIDER VISIT (OUTPATIENT)
Dept: MEDICAL GROUP | Facility: LAB | Age: 55
End: 2020-08-05
Payer: COMMERCIAL

## 2020-08-05 ENCOUNTER — TELEPHONE (OUTPATIENT)
Dept: MEDICAL GROUP | Facility: LAB | Age: 55
End: 2020-08-05

## 2020-08-05 VITALS — BODY MASS INDEX: 38.32 KG/M2 | TEMPERATURE: 98.6 F | WEIGHT: 230 LBS | HEIGHT: 65 IN | HEART RATE: 80 BPM

## 2020-08-05 DIAGNOSIS — E66.01 MORBID OBESITY WITH BMI OF 40.0-44.9, ADULT (HCC): ICD-10-CM

## 2020-08-05 DIAGNOSIS — I10 ESSENTIAL HYPERTENSION: ICD-10-CM

## 2020-08-05 DIAGNOSIS — J45.42 MODERATE PERSISTENT ASTHMA WITH STATUS ASTHMATICUS: ICD-10-CM

## 2020-08-05 DIAGNOSIS — Z23 NEED FOR VACCINATION: ICD-10-CM

## 2020-08-05 DIAGNOSIS — Z91.09 ENVIRONMENTAL ALLERGIES: ICD-10-CM

## 2020-08-05 PROCEDURE — 90750 HZV VACC RECOMBINANT IM: CPT | Performed by: FAMILY MEDICINE

## 2020-08-05 PROCEDURE — 99214 OFFICE O/P EST MOD 30 MIN: CPT | Mod: 25,95,CR | Performed by: NURSE PRACTITIONER

## 2020-08-05 PROCEDURE — 90471 IMMUNIZATION ADMIN: CPT | Performed by: FAMILY MEDICINE

## 2020-08-05 RX ORDER — MONTELUKAST SODIUM 10 MG/1
10 TABLET ORAL DAILY
Qty: 30 TAB | Refills: 5 | Status: SHIPPED | OUTPATIENT
Start: 2020-08-05 | End: 2020-12-18

## 2020-08-05 NOTE — PROGRESS NOTES
"Telemedicine Visit: Established Patient     This evaluation was conducted via Zoom, using secure and encrypted videoconferencing technology.  The patient was physical located at Home in [place], [place] and the physician was located in [facility].  The patient was presented by self, at home.  The patient's identity was confirmed and verbal consent for the telemedicine encounter was obtained.      Subjective:   CC:   Vicky Nino is a 55 y.o. female presenting for evaluation and management of:    #1- Asthma:  Chronic issue for the patient and not controlled, per pt, for the past several months- bothering her on a daily basis.  Dx in her 40's.  Has been told that she is \"allergic to everything.\"  \"I struggle to breath everyday.\"  Using albuterol 20-30 minutes before leaving the house to exercise.  Has chronic dry cough and throat congestion.  Tells me that her friends frequently comments that she wheezes throughout the day.  Cough wakes her up at night.  Able to go on walks for exercise but feels short of breath most of each walk.  Taking allertec and using flonase daily.  Seeing Dr Marinelli in a few weeks, has not seen her in over a year.    Mentions chronic mucus in throat - has tried omeprazole without improvement.    .    Nonsmoker.    Has always lived in [place].      #2-hypertension: Chronic issue.  Taking 2.5 mg of amlodipine daily.  Denies chest pain or leg swelling.  Not regularly checking her blood pressure at home.  Non-smoker.    #3-obesity: Chronic issue.  Fortunately slowly improving.  Working hard on exercise.  Struggles with her diet.  She has lost 9 pounds in the past 6 months.    ROS   Denies any recent fevers or chills. No nausea or vomiting. No chest pains or shortness of breath.     No Known Allergies    Current medicines (including changes today)  Current Outpatient Medications   Medication Sig Dispense Refill   • albuterol 108 (90 Base) MCG/ACT Aero Soln inhalation aerosol " INHALE 2 PUFFS BY MOUTH EVERY 6 HOURS AS NEEDED FOR SHORTNESS OF BREATH 8.5 Inhaler 1   • amLODIPine (NORVASC) 2.5 MG Tab TAKE 1 TABLET BY MOUTH EVERY DAY 90 Tab 1   • fluticasone (FLONASE) 50 MCG/ACT nasal spray Spray 2 Sprays in nose every day. 1 Bottle 11   • ibuprofen (MOTRIN) 200 MG Tab Take 400 mg by mouth every bedtime.     • multivitamin (THERAGRAN) Tab Take 1 Tab by mouth every day.     • COLLAGEN PO Take  by mouth every day. 1/2 scoop     • acyclovir (ZOVIRAX) 800 MG Tab TAKE 1 TABLET ORALLY 3 TIMES A DAY (Patient taking differently: Take 800 mg by mouth as needed. TAKE 1 TABLET ORALLY 3 TIMES A DAY) 30 Tab 1   • Cholecalciferol (VITAMIN D) 1000 UNIT CAPS Take 1 Cap by mouth every day.     • aspirin (ASA) 81 MG CHEW Take 81 mg by mouth every day.       No current facility-administered medications for this visit.        Patient Active Problem List    Diagnosis Date Noted   • Metabolic syndrome 11/19/2019   • Chronic fatigue 11/19/2019   • Morbid obesity with BMI of 40.0-44.9, adult ([facility]) 05/04/2018   • Dense breast tissue on mammogram 05/04/2018   • Depression 03/26/2018   • Abnormal CBC 10/20/2016   • HSV-1 infection 10/20/2016   • Elevated fasting glucose 10/06/2016   • Hyperlipidemia 10/06/2016   • HSV infection 10/06/2016   • History of cold sores 10/06/2016   • Family history of diabetes mellitus in mother 10/06/2016   • Family history of colon cancer 10/06/2016   • Asthma in adult 04/24/2015   • Environmental allergies 04/24/2015   • Essential hypertension 07/01/2014       Family History   Problem Relation Age of Onset   • Diabetes Mother    • Genitourinary () Problems Mother         renal failure, kidey transplant   • Hypertension Mother    • Thyroid Sister         2 with removal due to Graves   • Hypertension Brother    • Cancer Brother 60        colon cancer    • Cancer Paternal Grandmother         colon   • Cancer Paternal Grandfather         lung   • Cancer Maternal Aunt    • Hypertension  "Father    • Cancer Maternal Grandmother         breast   • Heart Disease Neg Hx        She  has a past medical history of Arrhythmia, Asthma, Chest discomfort (11/18/2013), Depression, High cholesterol, History of anemia, Hypertension, Obesity, Palpitations (11/18/2013), Psychiatric disorder, Snoring, and Ventricular bigeminy (11/18/2013).  She  has a past surgical history that includes colonoscopy; knee arthroscopy (Right, 6/3/2019); medial meniscectomy (Right, 6/3/2019); and chondroplasty (Right, 6/3/2019).       Objective:   Pulse 80   Temp 37 °C (98.6 °F)   Ht 1.651 m (5' 5\")   Wt 104.3 kg (230 lb)   LMP 09/18/2013   BMI 38.27 kg/m²     Physical Exam:  Constitutional: Alert, no distress, well-groomed.  Skin: No rashes in visible areas.  Eye: Round. Conjunctiva clear, lids normal. No icterus.   ENMT: Lips pink without lesions, good dentition, moist mucous membranes. Phonation normal.  Neck: No masses, no thyromegaly. Moves freely without pain.  CV: Pulse as reported by patient  Respiratory: Unlabored respiratory effort, no cough or audible wheeze  Psych: Alert and oriented x3, normal affect and mood.       Assessment and Plan:   The following treatment plan was discussed:   1. Moderate persistent asthma with status asthmaticus  -Uncontrolled as she is having daily symptoms and using her albuterol several times per day.  Added 10 mg of Symbicort at night and generic Advair twice daily.  Discussed how to take Singulair and Advair as well as potential side effects.  She remembers that she needs to rinse her mouth after the use of an inhaled steroid.  Encouraged to follow-up in 1 month.  She will keep her follow-up as well with her allergist.  Briefly discussed pulmonology consult if symptoms are not improving over the next several weeks.  She will continue to use generic antihistamines and Flonase.  She has a rescue inhaler readily available.  - montelukast (SINGULAIR) 10 MG Tab; Take 1 Tab by mouth every day. " At night  Dispense: 30 Tab; Refill: 5  - fluticasone-salmeterol (ADVAIR) 100-50 MCG/DOSE AEROSOL POWDER, BREATH ACTIVATED; Inhale 1 Puff by mouth every 12 hours.  Dispense: 1 Each; Refill: 5    2. Essential hypertension  -Encouraged her to check her blood pressure a few times per week at home and email or call if readings are consistently greater than 140/90.  Encouraged her to continue with efforts at weight loss, discussed a goal weight loss of 60 to 80 pounds over the next year.    3. Environmental allergies  -Scheduled follow-up with her allergist in the next few weeks.    4. Morbid obesity with BMI of 40.0-44.9, adult ([facility])  -Slowly improving.  She plans on continuing with efforts at weight loss.  She understands the dangers of obesity.  Encouraged her to have blood work done that is in the computer for her.    She is a  and very concerned about classroom because of her increased risk factors morbid obesity, hypertension and asthma.  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  I did write a letter stating that she has qualifying medical diagnoses such as hypertension, obesity and asthma.  If she chooses to return to the classroom, we discussed strict handwashing and mask wearing.      Follow-up: No follow-ups on file.

## 2020-08-05 NOTE — LETTER
August 5, 2020        Vicky Aguilera Trung  [id number]  [place] [address]      To whom it may concern:     Vicky has a chronic diagnosis of hypertension and asthma.  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      If you have any questions or concerns, please don't hesitate to call.        Sincerely,        HEIDE Castillo.    Electronically Signed

## 2020-08-05 NOTE — PROGRESS NOTES
"Vicky Nino is a 55 y.o. female here for a non-provider visit for:   SHINGRIX (Shingles)    Reason for immunization: Overdue/Provider Recommended  Immunization records indicate need for vaccine: Yes, confirmed with Epic  Minimum interval has been met for this vaccine: Yes  ABN completed: Yes    Order and dose verified by: AS  VIS was given to patient: Yes  All IAC Questionnaire questions were answered \"No.\"    Patient tolerated injection and no adverse effects were observed or reported: Yes    Pt scheduled for next dose in series: No    "

## 2020-08-05 NOTE — LETTER
August 5, 2020        Vicky Aguilera Trung  [id number]  [place] [address]        To whom it may concern:     Vicky has chronic medical diagnosis of hypertension and Asthma.       If you have any questions or concerns, please don't hesitate to call.        Sincerely,        HEIDE Castillo.  Family Practice with [facility]

## 2020-10-06 DIAGNOSIS — I10 ESSENTIAL HYPERTENSION: ICD-10-CM

## 2020-10-06 RX ORDER — AMLODIPINE BESYLATE 2.5 MG/1
TABLET ORAL
Qty: 90 TAB | Refills: 1 | Status: SHIPPED | OUTPATIENT
Start: 2020-10-06 | End: 2021-06-07 | Stop reason: SDUPTHER

## 2020-10-06 NOTE — TELEPHONE ENCOUNTER
Received request via: Pharmacy    Was the patient seen in the last year in this department? Yes  8/25/20  Does the patient have an active prescription (recently filled or refills available) for medication(s) requested? No

## 2020-12-09 ENCOUNTER — APPOINTMENT (RX ONLY)
Dept: URBAN - METROPOLITAN AREA CLINIC 20 | Facility: CLINIC | Age: 55
Setting detail: DERMATOLOGY
End: 2020-12-09

## 2020-12-09 DIAGNOSIS — L82.1 OTHER SEBORRHEIC KERATOSIS: ICD-10-CM

## 2020-12-09 DIAGNOSIS — D22 MELANOCYTIC NEVI: ICD-10-CM

## 2020-12-09 DIAGNOSIS — Z71.89 OTHER SPECIFIED COUNSELING: ICD-10-CM

## 2020-12-09 DIAGNOSIS — D18.0 HEMANGIOMA: ICD-10-CM

## 2020-12-09 DIAGNOSIS — L81.4 OTHER MELANIN HYPERPIGMENTATION: ICD-10-CM

## 2020-12-09 DIAGNOSIS — L82.0 INFLAMED SEBORRHEIC KERATOSIS: ICD-10-CM

## 2020-12-09 PROBLEM — D22.62 MELANOCYTIC NEVI OF LEFT UPPER LIMB, INCLUDING SHOULDER: Status: ACTIVE | Noted: 2020-12-09

## 2020-12-09 PROBLEM — D22.72 MELANOCYTIC NEVI OF LEFT LOWER LIMB, INCLUDING HIP: Status: ACTIVE | Noted: 2020-12-09

## 2020-12-09 PROBLEM — D22.39 MELANOCYTIC NEVI OF OTHER PARTS OF FACE: Status: ACTIVE | Noted: 2020-12-09

## 2020-12-09 PROBLEM — D22.5 MELANOCYTIC NEVI OF TRUNK: Status: ACTIVE | Noted: 2020-12-09

## 2020-12-09 PROBLEM — D22.71 MELANOCYTIC NEVI OF RIGHT LOWER LIMB, INCLUDING HIP: Status: ACTIVE | Noted: 2020-12-09

## 2020-12-09 PROBLEM — D22.61 MELANOCYTIC NEVI OF RIGHT UPPER LIMB, INCLUDING SHOULDER: Status: ACTIVE | Noted: 2020-12-09

## 2020-12-09 PROBLEM — D18.01 HEMANGIOMA OF SKIN AND SUBCUTANEOUS TISSUE: Status: ACTIVE | Noted: 2020-12-09

## 2020-12-09 PROCEDURE — ? ADDITIONAL NOTES

## 2020-12-09 PROCEDURE — ? COUNSELING

## 2020-12-09 PROCEDURE — ? OBSERVATION AND MEASURE

## 2020-12-09 PROCEDURE — 99214 OFFICE O/P EST MOD 30 MIN: CPT | Mod: 25

## 2020-12-09 PROCEDURE — ? LIQUID NITROGEN

## 2020-12-09 PROCEDURE — 17110 DESTRUCTION B9 LES UP TO 14: CPT

## 2020-12-09 PROCEDURE — ? SUNSCREEN RECOMMENDATIONS

## 2020-12-09 ASSESSMENT — LOCATION DETAILED DESCRIPTION DERM
LOCATION DETAILED: LEFT VENTRAL PROXIMAL FOREARM
LOCATION DETAILED: RIGHT DISTAL POSTERIOR UPPER ARM
LOCATION DETAILED: RIGHT INFERIOR CENTRAL MALAR CHEEK
LOCATION DETAILED: RIGHT DISTAL POSTERIOR THIGH
LOCATION DETAILED: RIGHT SUPERIOR UPPER BACK
LOCATION DETAILED: RIGHT INFERIOR FOREHEAD
LOCATION DETAILED: RIGHT INFERIOR MEDIAL MIDBACK
LOCATION DETAILED: RIGHT INFERIOR MEDIAL UPPER BACK
LOCATION DETAILED: LEFT PROXIMAL POSTERIOR UPPER ARM
LOCATION DETAILED: INFERIOR THORACIC SPINE
LOCATION DETAILED: RIGHT VENTRAL PROXIMAL FOREARM
LOCATION DETAILED: LEFT INFERIOR CENTRAL MALAR CHEEK
LOCATION DETAILED: LEFT LATERAL PROXIMAL PRETIBIAL REGION
LOCATION DETAILED: LEFT DISTAL POSTERIOR THIGH
LOCATION DETAILED: RIGHT PROXIMAL PRETIBIAL REGION
LOCATION DETAILED: RIGHT CENTRAL BUCCAL CHEEK

## 2020-12-09 ASSESSMENT — LOCATION SIMPLE DESCRIPTION DERM
LOCATION SIMPLE: LEFT PRETIBIAL REGION
LOCATION SIMPLE: RIGHT PRETIBIAL REGION
LOCATION SIMPLE: RIGHT POSTERIOR THIGH
LOCATION SIMPLE: RIGHT FOREARM
LOCATION SIMPLE: RIGHT POSTERIOR UPPER ARM
LOCATION SIMPLE: LEFT FOREARM
LOCATION SIMPLE: LEFT CHEEK
LOCATION SIMPLE: LEFT POSTERIOR THIGH
LOCATION SIMPLE: RIGHT FOREHEAD
LOCATION SIMPLE: RIGHT LOWER BACK
LOCATION SIMPLE: UPPER BACK
LOCATION SIMPLE: RIGHT CHEEK
LOCATION SIMPLE: LEFT POSTERIOR UPPER ARM
LOCATION SIMPLE: RIGHT UPPER BACK

## 2020-12-09 ASSESSMENT — LOCATION ZONE DERM
LOCATION ZONE: ARM
LOCATION ZONE: FACE
LOCATION ZONE: LEG
LOCATION ZONE: TRUNK

## 2020-12-09 NOTE — PROCEDURE: LIQUID NITROGEN
Consent: The patient's consent was obtained including but not limited to risks of crusting, scabbing, blistering, scarring, darker or lighter pigmentary change, recurrence, incomplete removal and infection.
Detail Level: Detailed
Add 52 Modifier (Optional): no
Post-Care Instructions: I reviewed with the patient in detail post-care instructions. Patient is to wear sunprotection, and avoid picking at any of the treated lesions. Pt may apply Vaseline to crusted or scabbing areas.
Medical Necessity Information: It is in your best interest to select a reason for this procedure from the list below. All of these items fulfill various CMS LCD requirements except the new and changing color options.
Medical Necessity Clause: This procedure was medically necessary because the lesions that were treated were:
Number Of Freeze-Thaw Cycles: 2 freeze-thaw cycles
Duration Of Freeze Thaw-Cycle (Seconds): 5-10

## 2020-12-18 DIAGNOSIS — J45.42 MODERATE PERSISTENT ASTHMA WITH STATUS ASTHMATICUS: ICD-10-CM

## 2020-12-18 RX ORDER — MONTELUKAST SODIUM 10 MG/1
10 TABLET ORAL DAILY
Qty: 90 TAB | Refills: 1 | Status: SHIPPED | OUTPATIENT
Start: 2020-12-18 | End: 2022-01-13

## 2020-12-18 NOTE — TELEPHONE ENCOUNTER
Received request via: Pharmacy    Was the patient seen in the last year in this department? Yes  8/5/2020  Does the patient have an active prescription (recently filled or refills available) for medication(s) requested? No

## 2021-03-05 ENCOUNTER — HOSPITAL ENCOUNTER (OUTPATIENT)
Dept: RADIOLOGY | Facility: MEDICAL CENTER | Age: 56
End: 2021-03-05
Attending: FAMILY MEDICINE
Payer: COMMERCIAL

## 2021-03-05 DIAGNOSIS — Z12.31 VISIT FOR SCREENING MAMMOGRAM: ICD-10-CM

## 2021-03-05 PROCEDURE — 77063 BREAST TOMOSYNTHESIS BI: CPT

## 2021-06-07 ENCOUNTER — PATIENT MESSAGE (OUTPATIENT)
Dept: MEDICAL GROUP | Facility: LAB | Age: 56
End: 2021-06-07

## 2021-06-07 DIAGNOSIS — I10 ESSENTIAL HYPERTENSION: ICD-10-CM

## 2021-06-07 RX ORDER — AMLODIPINE BESYLATE 2.5 MG/1
2.5 TABLET ORAL
Qty: 90 TABLET | Refills: 1 | Status: SHIPPED | OUTPATIENT
Start: 2021-06-07 | End: 2021-11-29

## 2021-06-07 NOTE — PATIENT COMMUNICATION
Received request via: Patient    Was the patient seen in the last year in this department? Yes  8/5/2020  Does the patient have an active prescription (recently filled or refills available) for medication(s) requested? No

## 2021-06-07 NOTE — TELEPHONE ENCOUNTER
----- Message from Vicky iNno sent at 6/7/2021  2:02 PM PDT -----  Regarding: Prescription Question  Contact: [phone]  Abdi Santana,    Would it be possible to send in a RX for 5 days of pills for my Amlodipine?  I am out and I can't get in for an appt. until Friday.      Thank you,  Vicky

## 2021-06-18 ENCOUNTER — APPOINTMENT (OUTPATIENT)
Dept: MEDICAL GROUP | Facility: LAB | Age: 56
End: 2021-06-18
Payer: COMMERCIAL

## 2021-11-29 DIAGNOSIS — I10 ESSENTIAL HYPERTENSION: ICD-10-CM

## 2021-11-29 RX ORDER — AMLODIPINE BESYLATE 2.5 MG/1
2.5 TABLET ORAL
Qty: 90 TABLET | Refills: 1 | Status: SHIPPED | OUTPATIENT
Start: 2021-11-29 | End: 2022-01-13 | Stop reason: SDUPTHER

## 2022-01-04 ENCOUNTER — APPOINTMENT (RX ONLY)
Dept: URBAN - METROPOLITAN AREA CLINIC 20 | Facility: CLINIC | Age: 57
Setting detail: DERMATOLOGY
End: 2022-01-04

## 2022-01-04 DIAGNOSIS — Z71.89 OTHER SPECIFIED COUNSELING: ICD-10-CM

## 2022-01-04 DIAGNOSIS — L81.4 OTHER MELANIN HYPERPIGMENTATION: ICD-10-CM

## 2022-01-04 DIAGNOSIS — L82.1 OTHER SEBORRHEIC KERATOSIS: ICD-10-CM

## 2022-01-04 DIAGNOSIS — D18.0 HEMANGIOMA: ICD-10-CM

## 2022-01-04 DIAGNOSIS — D22 MELANOCYTIC NEVI: ICD-10-CM | Status: STABLE

## 2022-01-04 PROBLEM — D22.72 MELANOCYTIC NEVI OF LEFT LOWER LIMB, INCLUDING HIP: Status: ACTIVE | Noted: 2022-01-04

## 2022-01-04 PROBLEM — D18.01 HEMANGIOMA OF SKIN AND SUBCUTANEOUS TISSUE: Status: ACTIVE | Noted: 2022-01-04

## 2022-01-04 PROBLEM — D22.39 MELANOCYTIC NEVI OF OTHER PARTS OF FACE: Status: ACTIVE | Noted: 2022-01-04

## 2022-01-04 PROBLEM — D22.62 MELANOCYTIC NEVI OF LEFT UPPER LIMB, INCLUDING SHOULDER: Status: ACTIVE | Noted: 2022-01-04

## 2022-01-04 PROBLEM — D22.71 MELANOCYTIC NEVI OF RIGHT LOWER LIMB, INCLUDING HIP: Status: ACTIVE | Noted: 2022-01-04

## 2022-01-04 PROBLEM — D22.5 MELANOCYTIC NEVI OF TRUNK: Status: ACTIVE | Noted: 2022-01-04

## 2022-01-04 PROBLEM — D22.61 MELANOCYTIC NEVI OF RIGHT UPPER LIMB, INCLUDING SHOULDER: Status: ACTIVE | Noted: 2022-01-04

## 2022-01-04 PROCEDURE — ? COUNSELING

## 2022-01-04 PROCEDURE — ? ADDITIONAL NOTES

## 2022-01-04 PROCEDURE — ? SUNSCREEN RECOMMENDATIONS

## 2022-01-04 PROCEDURE — 99213 OFFICE O/P EST LOW 20 MIN: CPT

## 2022-01-04 PROCEDURE — ? OBSERVATION AND MEASURE

## 2022-01-04 ASSESSMENT — LOCATION DETAILED DESCRIPTION DERM
LOCATION DETAILED: RIGHT INFERIOR CENTRAL MALAR CHEEK
LOCATION DETAILED: RIGHT VENTRAL PROXIMAL FOREARM
LOCATION DETAILED: RIGHT PROXIMAL PRETIBIAL REGION
LOCATION DETAILED: LEFT VENTRAL PROXIMAL FOREARM
LOCATION DETAILED: RIGHT INFERIOR MEDIAL UPPER BACK
LOCATION DETAILED: INFERIOR THORACIC SPINE
LOCATION DETAILED: RIGHT SUPERIOR UPPER BACK
LOCATION DETAILED: RIGHT DISTAL POSTERIOR UPPER ARM
LOCATION DETAILED: LEFT LATERAL PROXIMAL PRETIBIAL REGION
LOCATION DETAILED: RIGHT DISTAL POSTERIOR THIGH
LOCATION DETAILED: RIGHT INFERIOR FOREHEAD
LOCATION DETAILED: RIGHT INFERIOR MEDIAL MIDBACK
LOCATION DETAILED: LEFT PROXIMAL POSTERIOR UPPER ARM
LOCATION DETAILED: RIGHT CENTRAL BUCCAL CHEEK
LOCATION DETAILED: LEFT DISTAL POSTERIOR THIGH

## 2022-01-04 ASSESSMENT — LOCATION SIMPLE DESCRIPTION DERM
LOCATION SIMPLE: RIGHT FOREARM
LOCATION SIMPLE: RIGHT POSTERIOR THIGH
LOCATION SIMPLE: UPPER BACK
LOCATION SIMPLE: RIGHT POSTERIOR UPPER ARM
LOCATION SIMPLE: RIGHT UPPER BACK
LOCATION SIMPLE: LEFT POSTERIOR UPPER ARM
LOCATION SIMPLE: LEFT FOREARM
LOCATION SIMPLE: RIGHT PRETIBIAL REGION
LOCATION SIMPLE: LEFT PRETIBIAL REGION
LOCATION SIMPLE: RIGHT FOREHEAD
LOCATION SIMPLE: RIGHT CHEEK
LOCATION SIMPLE: LEFT POSTERIOR THIGH
LOCATION SIMPLE: RIGHT LOWER BACK

## 2022-01-04 ASSESSMENT — LOCATION ZONE DERM
LOCATION ZONE: ARM
LOCATION ZONE: TRUNK
LOCATION ZONE: FACE
LOCATION ZONE: LEG

## 2022-01-04 NOTE — PROCEDURE: ADDITIONAL NOTES
Additional Notes: Pt given information for plastic surgeon if she wants cosmetic removal. (Dr. San)
Detail Level: Detailed
Render Risk Assessment In Note?: no

## 2022-01-08 ENCOUNTER — HOSPITAL ENCOUNTER (EMERGENCY)
Facility: MEDICAL CENTER | Age: 57
End: 2022-01-08
Attending: EMERGENCY MEDICINE
Payer: COMMERCIAL

## 2022-01-08 VITALS
TEMPERATURE: 99.1 F | RESPIRATION RATE: 18 BRPM | WEIGHT: 250.22 LBS | OXYGEN SATURATION: 97 % | HEIGHT: 65 IN | HEART RATE: 89 BPM | DIASTOLIC BLOOD PRESSURE: 75 MMHG | SYSTOLIC BLOOD PRESSURE: 131 MMHG | BODY MASS INDEX: 41.69 KG/M2

## 2022-01-08 DIAGNOSIS — I10 PRIMARY HYPERTENSION: ICD-10-CM

## 2022-01-08 PROCEDURE — U0005 INFEC AGEN DETEC AMPLI PROBE: HCPCS

## 2022-01-08 PROCEDURE — A9270 NON-COVERED ITEM OR SERVICE: HCPCS | Performed by: EMERGENCY MEDICINE

## 2022-01-08 PROCEDURE — 99283 EMERGENCY DEPT VISIT LOW MDM: CPT

## 2022-01-08 PROCEDURE — 700102 HCHG RX REV CODE 250 W/ 637 OVERRIDE(OP): Performed by: EMERGENCY MEDICINE

## 2022-01-08 PROCEDURE — U0003 INFECTIOUS AGENT DETECTION BY NUCLEIC ACID (DNA OR RNA); SEVERE ACUTE RESPIRATORY SYNDROME CORONAVIRUS 2 (SARS-COV-2) (CORONAVIRUS DISEASE [COVID-19]), AMPLIFIED PROBE TECHNIQUE, MAKING USE OF HIGH THROUGHPUT TECHNOLOGIES AS DESCRIBED BY CMS-2020-01-R: HCPCS

## 2022-01-08 RX ORDER — AMLODIPINE BESYLATE 5 MG/1
2.5 TABLET ORAL
Status: DISCONTINUED | OUTPATIENT
Start: 2022-01-08 | End: 2022-01-08 | Stop reason: HOSPADM

## 2022-01-08 RX ADMIN — AMLODIPINE BESYLATE 2.5 MG: 5 TABLET ORAL at 17:10

## 2022-01-08 NOTE — ED NOTES
Patient walked with a steady gate at this time to er [facility] area room 60. Placed patient into gown, on auto bp, spo2, gave warm blanket, and call light. Ready to be seen  rn aware    Patient able to walk to and from restroom with steady gate at this time. Urine sample is at bedside

## 2022-01-08 NOTE — ED TRIAGE NOTES
Chief Complaint   Patient presents with   • Blood Pressure Problem     Pt complains of elevated blood pressure. States that is has been increasing since last night. Pt states she has a headache, dizzyiness.     Pt educated upon triage process and told to inform  staff of any changes in condition so that Pt may be reassessed. No further questions at this time. Pt sitting out in lobby.

## 2022-01-09 LAB
SARS-COV-2 RNA RESP QL NAA+PROBE: NOTDETECTED
SPECIMEN SOURCE: NORMAL

## 2022-01-09 NOTE — ED PROVIDER NOTES
ED Provider Note    CHIEF COMPLAINT  Chief Complaint   Patient presents with   • Blood Pressure Problem     Pt complains of elevated blood pressure. States that is has been increasing since last night. Pt states she has a headache, dizzyiness.       HPI  Vicky Nino is a 56 y.o. female who presents for evaluation of hypertension.  She states that she felt some intermittent chills, tightness in her back, some lightheadedness as well so she took her blood pressure noted to be high.  This was all last night.  Since then she states every time she takes her blood pressure increases.  She does have a history of hypertension on 2.5 mg of Norvasc a day.  No chest pain, no exertional changes, no shortness of breath, no focal weakness numbness or tingling. Vaccinated against COVID including her booster.    REVIEW OF SYSTEMS  Negative for fever, rash, chest pain, dyspnea, abdominal pain, back pain. All other systems are negative.     PAST MEDICAL HISTORY   has a past medical history of Arrhythmia, Asthma, Chest discomfort (11/18/2013), Depression, High cholesterol, History of anemia, Hypertension, Obesity, Palpitations (11/18/2013), Psychiatric disorder, Snoring, and Ventricular bigeminy (11/18/2013).    SOCIAL HISTORY  Social History     Tobacco Use   • Smoking status: Never Smoker   • Smokeless tobacco: Never Used   Substance and Sexual Activity   • Alcohol use: Yes     Alcohol/week: 0.0 - 8.4 oz     Comment: Social    • Drug use: No   • Sexual activity: Not Currently     Partners: Male       SURGICAL HISTORY   has a past surgical history that includes colonoscopy; knee arthroscopy (Right, 6/3/2019); meniscectomy, knee, medial (Right, 6/3/2019); and chondroplasty (Right, 6/3/2019).    CURRENT MEDICATIONS  I personally reviewed the medication list in the charting documentation.     ALLERGIES  No Known Allergies    PHYSICAL EXAM  VITAL SIGNS: BP (!) 174/111   Pulse 83  Temp 37.2 °C (99 °F) (Temporal)   Resp 16   Ht  "1.651 m (5' 5\")   Wt 113 kg (250 lb 3.6 oz)   LMP 09/18/2013   SpO2 97%   BMI 41.64 kg/m²   Constitutional: Well appearing patient in no acute distress.  Awake and alert, not toxic nor ill in appearance.  HENT: Normocephalic, no obvious evidence of acute trauma.   Neck: Comfortable movement without any obvious restriction in the range of motion.  Eyes: Conjunctiva normal, Non-icteric.   Chest: Normal nonlabored respirations.  Skin: The exposed portions of skin reveal no obvious rash or other abnormalities.  Musculoskeletal: No obvious restriction in the range of motion in all major joints.   Neurologic: Alert, No obvious focal deficits noted.   Psychiatric: Affect normal for clinical presentation    COURSE & MEDICAL DECISION MAKING  Pertinent Labs & Imaging studies reviewed. (See chart for details)    Encounter Summary: This is a very pleasant 56 y.o. female who unfortunately required evaluation in the emergency department today with essentially asymptomatic hypertension.  The symptoms she describes would not be attributed to hypertension, a more concerned about a viral syndrome, possibly even COVID although she is vaccinated.  These included chills and a headache and some lightheadedness.  No focal neurologic planes or findings on exam, she does not have significant chest pain, no shortness of breath.  She takes a minimal dose of Norvasc daily.  I will treat her here in the emergency department with an additional dose of her Norvasc.  She also be swabbed for Covid.  Strict return instructions provided, I have also instructed her to monitor her blood pressure at home, take an additional dose of Norvasc if she is hypertensive at home but most importantly to follow-up with a primary care physician.  Strict return instructions provided  /75   Pulse 89   Temp 37.3 °C (99.1 °F)   Resp 18   Ht 1.651 m (5' 5\")   Wt 113 kg (250 lb 3.6 oz)   LMP 09/18/2013   SpO2 97%   BMI 41.64 kg/m²       DISPOSITION: " Discharge Home      FINAL IMPRESSION  1. Primary hypertension        This dictation was created using voice recognition software. The accuracy of the dictation is limited to the abilities of the software. I expect there may be some errors of grammar and possibly content. The nursing notes were reviewed and certain aspects of this information were incorporated into this note.    Electronically signed by: Rodrigo Richard M.D., 1/8/2022 4:54 PM

## 2022-01-12 NOTE — PROGRESS NOTES
Cardiology Initial Consultation Note    Date of note:    1/13/2022    Primary Care Provider: Yolanda Murphy M.D.  Referring Provider: Self-referred    Patient Name: Vicky Dumont     YOB: 1965  MRN:              [id number]    Chief Complaint: Hypertension     History of Present Illness: Ms. Vicky Nino is a 56 y.o. female whose current medical problems include hypertension, PVCs, and mild mitral regurgitation who is here for cardiac consultation for hypertension.    The patient was previously a patient of Dr. Pichardo, last seen 5/7/2018.  During the last visit, the patient complained of tachycardia, and she was ordered an echocardiogram, which was not done.  The patient has not followed up since.    The patient returns today to discuss symptoms. The patient reports feeling not well last Friday (extremely fatigued), and her BP was very high elevated to 170s/100.  She checked again later, and it was slightly higher. She took her medication, and it was still elevated. She was tested negative for COVID.  The patient had taken off 3 days and started eating healthier, and her BP at physician offices have improved.  She denies any chest pain or shortness of breath on exertion.  No orthopnea, PND, or leg swelling.  No palpitations.  No syncope or presyncopal episodes.    Cardiovascular Risk Factors:  1. Smoking status: Never smoker  2. Type II Diabetes Mellitus: Diet controlled  Lab Results   Component Value Date/Time    HBA1C 5.7 (H) 05/29/2019 04:10 PM    HBA1C 5.3 03/27/2018 08:28 AM     3. Hypertension: On medication  4. Dyslipidemia: On medication  Cholesterol,Tot   Date Value Ref Range Status   11/09/2018 211 (H) 100 - 199 mg/dL Final     LDL   Date Value Ref Range Status   11/09/2018 125 (H) <100 mg/dL Final     HDL   Date Value Ref Range Status   11/09/2018 60 >=40 mg/dL Final     Triglycerides   Date Value Ref Range Status   11/09/2018 128 0 - 149 mg/dL Final     5.  "Family history of early Coronary Artery Disease in a first degree relative (Male less than 55 years of age; Female less than 65 years of age): None  6.  Obesity and/or Metabolic Syndrome: BMI 41.4  7. Sedentary lifestyle: Active    Review of Systems   Constitutional: Negative for fever, malaise/fatigue and night sweats.   Cardiovascular: Negative for chest pain, dyspnea on exertion, irregular heartbeat, leg swelling, near-syncope, orthopnea, palpitations, paroxysmal nocturnal dyspnea and syncope.   Respiratory: Negative for cough, shortness of breath and wheezing.    Gastrointestinal: Negative for abdominal pain, diarrhea, nausea and vomiting.   Neurological: Negative for dizziness, focal weakness and weakness.       All other systems reviewed and are negative.       Current Outpatient Medications   Medication Sig Dispense Refill   • amLODIPine (NORVASC) 5 MG Tab Take 1 Tablet by mouth every day. 90 Tablet 1   • albuterol 108 (90 Base) MCG/ACT Aero Soln inhalation aerosol INHALE 2 PUFFS BY MOUTH EVERY 6 HOURS AS NEEDED FOR SHORTNESS OF BREATH 8.5 Inhaler 1   • fluticasone (FLONASE) 50 MCG/ACT nasal spray Spray 2 Sprays in nose every day. 1 Bottle 11   • ibuprofen (MOTRIN) 200 MG Tab Take 400 mg by mouth every bedtime.     • multivitamin (THERAGRAN) Tab Take 1 Tab by mouth every day.     • COLLAGEN PO Take  by mouth every day. 1/2 scoop     • acyclovir (ZOVIRAX) 800 MG Tab TAKE 1 TABLET ORALLY 3 TIMES A DAY (Patient taking differently: Take 800 mg by mouth as needed. TAKE 1 TABLET ORALLY 3 TIMES A DAY) 30 Tab 1   • Cholecalciferol (VITAMIN D) 1000 UNIT CAPS Take 1 Cap by mouth every day.     • aspirin (ASA) 81 MG CHEW Take 81 mg by mouth every day.       No current facility-administered medications for this visit.       No Known Allergies    Physical Exam:  Ambulatory Vitals  /86 (BP Location: Left arm, Patient Position: Sitting, BP Cuff Size: Adult)   Pulse 99   Resp 20   Ht 1.638 m (5' 4.5\")   Wt 112 kg " (246 lb)   SpO2 98%    Oxygen Therapy:  Pulse Oximetry: 98 %  BP Readings from Last 4 Encounters:   01/13/22 132/86   01/13/22 126/80   01/08/22 131/75   01/29/20 126/78       Weight/BMI: Body mass index is 41.57 kg/m².  Wt Readings from Last 4 Encounters:   01/13/22 112 kg (246 lb)   01/13/22 111 kg (245 lb)   01/08/22 113 kg (250 lb 3.6 oz)   08/05/20 104 kg (230 lb)       General: Well appearing and in no apparent distress  Eyes: nl conjunctiva, no icteric sclera  ENT: wearing a mask, normal external appearance of ears  Neck: no visible JVP,  no carotid bruits  Lungs: normal respiratory effort, CTAB  Heart: RRR, no murmurs, no rubs or gallops,  no edema bilateral lower extremities. No LV/RV heave on cardiac palpatation. + bilateral radial pulses.  + bilateral dp pulses.   Abdomen: soft, non tender, non distended, no masses, normal bowel sounds.  No HSM.  Extremities/MSK: no clubbing, no cyanosis  Neurological: No focal sensory deficits  Psychiatric: Appropriate affect, A/O x 3, intact judgement and insight  Skin: Warm extremities      Lab Data Review:  Lab Results   Component Value Date/Time    CHOLSTRLTOT 211 (H) 11/09/2018 04:30 PM     (H) 11/09/2018 04:30 PM    HDL 60 11/09/2018 04:30 PM    TRIGLYCERIDE 128 11/09/2018 04:30 PM       Lab Results   Component Value Date/Time    SODIUM 140 05/29/2019 04:10 PM    POTASSIUM 4.0 05/29/2019 04:10 PM    CHLORIDE 107 05/29/2019 04:10 PM    CO2 22 05/29/2019 04:10 PM    GLUCOSE 79 05/29/2019 04:10 PM    BUN 16 05/29/2019 04:10 PM    CREATININE 0.68 05/29/2019 04:10 PM    CREATININE 0.69 04/12/2010 10:45 AM    BUNCREATRAT 13 04/12/2010 10:45 AM    GLOMRATE >59 04/12/2010 10:45 AM     Lab Results   Component Value Date/Time    ALKPHOSPHAT 62 03/27/2018 08:28 AM    ASTSGOT 16 03/27/2018 08:28 AM    ALTSGPT 22 03/27/2018 08:28 AM    TBILIRUBIN 0.6 03/27/2018 08:28 AM      Lab Results   Component Value Date/Time    WBC 6.2 05/29/2019 04:10 PM     Lab Results    Component Value Date/Time    HBA1C 5.7 (H) 05/29/2019 04:10 PM    HBA1C 5.3 03/27/2018 08:28 AM         Cardiac Imaging and Procedures Review:    EKG dated 5/29/2019: My personal interpretation is sinus rhythm, first-degree AV block, poor R wave progression    EKG dated 1/13/2022: My personal interpretation is sinus rhythm, low voltage in precordial leads    Echo dated 11/21/2013:   CONCLUSIONS   Normal left ventricular size, thickness, systolic function, and   diastolic function.   Mild mitral regurgitation.   Trace tricuspid regurgitation.   Otherwise normal study.        Nuclear Perfusion Imaging (11/22/2013):   Impression    1.  Left ventricular ejection fraction 64%.   2.  Matched mildly decreased activity inferior wall left ventricle   consistent with attenuation artifact. No reversible ischemia identified.      Assessment & Plan     1. Essential hypertension  Cardiac Stress Test Treadmill Only    EC-ECHOCARDIOGRAM COMPLETE W/O CONT   2. Dyslipidemia     3. Mild mitral regurgitation  Cardiac Stress Test Treadmill Only    EC-ECHOCARDIOGRAM COMPLETE W/O CONT         Shared Medical Decision Making:    Hypertension  BP well controlled this visit  -Continue amlodipine 5 mg daily  -Obtain a treadmill EKG to assess BP response to exercise and to assess for any ischemia.    Mild mitral regurgitation  Euvolemic on exam  -Repeat echocardiogram to assess for progression, given worsening hypertension and it has been over 5 years since last echocardiogram.    Dyslipidemia  Last labs were from 2018  -Repeat lipid panel prior to next visit (ordered by PCP)    All of patient's excellent questions were answered to the best of my knowledge and to heart satisfaction.  It was a pleasure seeing Ms. Vicky Nino in my clinic today. Return in about 10 weeks (around 3/24/2022). Patient is aware to call the cardiology clinic with any questions or concerns.      Belle Rao MD  [facility]  [facility]  [address] [address]  Phone: [phone]  Fax: [phone]

## 2022-01-13 ENCOUNTER — OFFICE VISIT (OUTPATIENT)
Dept: MEDICAL GROUP | Facility: LAB | Age: 57
End: 2022-01-13
Payer: COMMERCIAL

## 2022-01-13 ENCOUNTER — HOSPITAL ENCOUNTER (OUTPATIENT)
Dept: LAB | Facility: MEDICAL CENTER | Age: 57
End: 2022-01-13
Attending: FAMILY MEDICINE
Payer: COMMERCIAL

## 2022-01-13 ENCOUNTER — OFFICE VISIT (OUTPATIENT)
Dept: CARDIOLOGY | Facility: MEDICAL CENTER | Age: 57
End: 2022-01-13
Payer: COMMERCIAL

## 2022-01-13 VITALS
WEIGHT: 245 LBS | HEIGHT: 65 IN | BODY MASS INDEX: 40.82 KG/M2 | HEART RATE: 97 BPM | SYSTOLIC BLOOD PRESSURE: 126 MMHG | TEMPERATURE: 96.4 F | DIASTOLIC BLOOD PRESSURE: 80 MMHG | OXYGEN SATURATION: 97 % | RESPIRATION RATE: 15 BRPM

## 2022-01-13 VITALS
SYSTOLIC BLOOD PRESSURE: 132 MMHG | DIASTOLIC BLOOD PRESSURE: 86 MMHG | BODY MASS INDEX: 40.98 KG/M2 | OXYGEN SATURATION: 98 % | HEIGHT: 65 IN | HEART RATE: 99 BPM | WEIGHT: 246 LBS | RESPIRATION RATE: 20 BRPM

## 2022-01-13 DIAGNOSIS — I10 ESSENTIAL HYPERTENSION: ICD-10-CM

## 2022-01-13 DIAGNOSIS — E78.5 DYSLIPIDEMIA: ICD-10-CM

## 2022-01-13 DIAGNOSIS — I34.0 MILD MITRAL REGURGITATION: ICD-10-CM

## 2022-01-13 DIAGNOSIS — Z12.4 SCREENING FOR CERVICAL CANCER: ICD-10-CM

## 2022-01-13 PROBLEM — Z87.828 HISTORY OF MENISCAL TEAR: Status: ACTIVE | Noted: 2019-05-21

## 2022-01-13 LAB
ALBUMIN SERPL BCP-MCNC: 5.1 G/DL (ref 3.2–4.9)
ALBUMIN/GLOB SERPL: 1.8 G/DL
ALP SERPL-CCNC: 74 U/L (ref 30–99)
ALT SERPL-CCNC: 46 U/L (ref 2–50)
ANION GAP SERPL CALC-SCNC: 13 MMOL/L (ref 7–16)
AST SERPL-CCNC: 23 U/L (ref 12–45)
BILIRUB SERPL-MCNC: 0.4 MG/DL (ref 0.1–1.5)
BUN SERPL-MCNC: 11 MG/DL (ref 8–22)
CALCIUM SERPL-MCNC: 10 MG/DL (ref 8.5–10.5)
CHLORIDE SERPL-SCNC: 102 MMOL/L (ref 96–112)
CHOLEST SERPL-MCNC: 234 MG/DL (ref 100–199)
CO2 SERPL-SCNC: 22 MMOL/L (ref 20–33)
CREAT SERPL-MCNC: 0.69 MG/DL (ref 0.5–1.4)
EKG IMPRESSION: NORMAL
EST. AVERAGE GLUCOSE BLD GHB EST-MCNC: 126 MG/DL
FASTING STATUS PATIENT QL REPORTED: NORMAL
GLOBULIN SER CALC-MCNC: 2.9 G/DL (ref 1.9–3.5)
GLUCOSE SERPL-MCNC: 115 MG/DL (ref 65–99)
HBA1C MFR BLD: 6 % (ref 4–5.6)
HDLC SERPL-MCNC: 54 MG/DL
LDLC SERPL CALC-MCNC: 148 MG/DL
POTASSIUM SERPL-SCNC: 4.1 MMOL/L (ref 3.6–5.5)
PROT SERPL-MCNC: 8 G/DL (ref 6–8.2)
SODIUM SERPL-SCNC: 137 MMOL/L (ref 135–145)
TRIGL SERPL-MCNC: 162 MG/DL (ref 0–149)
TSH SERPL DL<=0.005 MIU/L-ACNC: 1.27 UIU/ML (ref 0.38–5.33)

## 2022-01-13 PROCEDURE — 83036 HEMOGLOBIN GLYCOSYLATED A1C: CPT

## 2022-01-13 PROCEDURE — 84443 ASSAY THYROID STIM HORMONE: CPT

## 2022-01-13 PROCEDURE — 80053 COMPREHEN METABOLIC PANEL: CPT

## 2022-01-13 PROCEDURE — 93000 ELECTROCARDIOGRAM COMPLETE: CPT | Performed by: STUDENT IN AN ORGANIZED HEALTH CARE EDUCATION/TRAINING PROGRAM

## 2022-01-13 PROCEDURE — 36415 COLL VENOUS BLD VENIPUNCTURE: CPT

## 2022-01-13 PROCEDURE — 80061 LIPID PANEL: CPT

## 2022-01-13 PROCEDURE — 99204 OFFICE O/P NEW MOD 45 MIN: CPT | Mod: 25 | Performed by: STUDENT IN AN ORGANIZED HEALTH CARE EDUCATION/TRAINING PROGRAM

## 2022-01-13 PROCEDURE — 93018 CV STRESS TEST I&R ONLY: CPT | Performed by: STUDENT IN AN ORGANIZED HEALTH CARE EDUCATION/TRAINING PROGRAM

## 2022-01-13 PROCEDURE — 99214 OFFICE O/P EST MOD 30 MIN: CPT | Performed by: FAMILY MEDICINE

## 2022-01-13 RX ORDER — AMLODIPINE BESYLATE 2.5 MG/1
5 TABLET ORAL
Qty: 90 TABLET | Refills: 1 | Status: CANCELLED | OUTPATIENT
Start: 2022-01-13

## 2022-01-13 RX ORDER — AMLODIPINE BESYLATE 5 MG/1
5 TABLET ORAL
Qty: 90 TABLET | Refills: 1 | Status: SHIPPED | OUTPATIENT
Start: 2022-01-13 | End: 2022-02-17 | Stop reason: SDUPTHER

## 2022-01-13 ASSESSMENT — ENCOUNTER SYMPTOMS
WEAKNESS: 0
SYNCOPE: 0
FEVER: 0
SHORTNESS OF BREATH: 0
WHEEZING: 0
COUGH: 0
PND: 0
DYSPNEA ON EXERTION: 0
ORTHOPNEA: 0
DIARRHEA: 0
DIZZINESS: 0
NEAR-SYNCOPE: 0
VOMITING: 0
ABDOMINAL PAIN: 0
FOCAL WEAKNESS: 0
PALPITATIONS: 0
IRREGULAR HEARTBEAT: 0
NAUSEA: 0
NIGHT SWEATS: 0

## 2022-01-13 ASSESSMENT — PATIENT HEALTH QUESTIONNAIRE - PHQ9
5. POOR APPETITE OR OVEREATING: 2 - MORE THAN HALF THE DAYS
CLINICAL INTERPRETATION OF PHQ2 SCORE: 2
SUM OF ALL RESPONSES TO PHQ QUESTIONS 1-9: 10

## 2022-01-13 NOTE — PROGRESS NOTES
Chief Complaint   Patient presents with   • Establish Care   • Hypertension     X lightheaded, amlodpine 5mg, dry janurary.    • Annual Exam         Vicky Nino is a 56 y.o. female here to establish care and for evaluation and management of:        HPI:    BP: Increased her amlodipine on her own. Was on 2.5 mg amlodipine previously.   A week ago was in the 150's/90's. Hadn't been feeling good in general prior to this.   Was seen in the ER . Given extra dose of amlodipine in the ED. Has increased it since then.   Does report poor eating and drinkign over the holidays.   Has been eating lean meats and veggies since the ER. Did buy a recumbent bike this weekend.     Does see cardio today.      passed away in 2018 from a massive PE.   Kids are out of the house now.   Teacher, working with low vision students. Left the classroom initially due to the environment.   Still some mood reduction. Has never lived by herself before. Has a small dog and a bunny.   Depression Screening    Little interest or pleasure in doing things?  1 - several days   Feeling down, depressed , or hopeless? 1 - several days   Trouble falling or staying asleep, or sleeping too much?  2 - more than half the days   Feeling tired or having little energy?  2 - more than half the days   Poor appetite or overeating?  2 - more than half the days   Feeling bad about yourself - or that you are a failure or have let yourself or your family down? 1 - several days   Trouble concentrating on things, such as reading the newspaper or watching television? 1 - several days   Moving or speaking so slowly that other people could have noticed.  Or the opposite - being so fidgety or restless that you have been moving around a lot more than usual?  0 - not at all   Thoughts that you would be better off dead, or of hurting yourself?  0 - not at all   Patient Health Questionnaire Score: 10       If depressive symptoms identified deferred to follow up visit  unless specifically addressed in assesment and plan.    Interpretation of PHQ-9 Total Score   Score Severity   1-4 No Depression   5-9 Mild Depression   10-14 Moderate Depression   15-19 Moderately Severe Depression   20-27 Severe Depression      Does have some issues with asthma and a deviated septum. Seeing Allergy/pulm for this. Thinking about deviated septum repair.       Does have some cancer history in the family.   No Known Allergies    Current medicines (including changes today)  Current Outpatient Medications   Medication Sig Dispense Refill   • amLODIPine (NORVASC) 2.5 MG Tab TAKE 1 TABLET BY MOUTH EVERY DAY 90 Tablet 1   • fluticasone-salmeterol (ADVAIR) 100-50 MCG/DOSE AEROSOL POWDER, BREATH ACTIVATED TAKE 1 PUFF BY MOUTH EVERY 12 HOURS 1 Each 1   • albuterol 108 (90 Base) MCG/ACT Aero Soln inhalation aerosol INHALE 2 PUFFS BY MOUTH EVERY 6 HOURS AS NEEDED FOR SHORTNESS OF BREATH 8.5 Inhaler 1   • fluticasone (FLONASE) 50 MCG/ACT nasal spray Spray 2 Sprays in nose every day. 1 Bottle 11   • ibuprofen (MOTRIN) 200 MG Tab Take 400 mg by mouth every bedtime.     • multivitamin (THERAGRAN) Tab Take 1 Tab by mouth every day.     • COLLAGEN PO Take  by mouth every day. 1/2 scoop     • acyclovir (ZOVIRAX) 800 MG Tab TAKE 1 TABLET ORALLY 3 TIMES A DAY (Patient taking differently: Take 800 mg by mouth as needed. TAKE 1 TABLET ORALLY 3 TIMES A DAY) 30 Tab 1   • Cholecalciferol (VITAMIN D) 1000 UNIT CAPS Take 1 Cap by mouth every day.     • aspirin (ASA) 81 MG CHEW Take 81 mg by mouth every day.       No current facility-administered medications for this visit.     She  has a past medical history of Arrhythmia, Asthma, Chest discomfort (11/18/2013), Depression, High cholesterol, History of anemia, Hypertension, Obesity, Palpitations (11/18/2013), Psychiatric disorder, Snoring, and Ventricular bigeminy (11/18/2013).  She  has a past surgical history that includes colonoscopy; knee arthroscopy (Right, 6/3/2019);  "meniscectomy, knee, medial (Right, 6/3/2019); and chondroplasty (Right, 6/3/2019).  Social History     Tobacco Use   • Smoking status: Never Smoker   • Smokeless tobacco: Never Used   Vaping Use   • Vaping Use: Never used   Substance Use Topics   • Alcohol use: Yes     Alcohol/week: 0.0 - 8.4 oz     Comment: Social    • Drug use: No     Social History     Social History Narrative   • Not on file     Family History   Problem Relation Age of Onset   • Diabetes Mother    • Genitourinary () Problems Mother         renal failure, kidey transplant   • Hypertension Mother    • Thyroid Sister         2 with removal due to Graves   • Hypertension Brother    • Cancer Brother 60        colon cancer    • Cancer Paternal Grandmother         colon   • Cancer Paternal Grandfather         lung   • Cancer Maternal Aunt    • Hypertension Father    • Cancer Maternal Grandmother         breast   • Heart Disease Neg Hx      Family Status   Relation Name Status   • Mo  Alive   • Sis 2 Alive   • Bro 1 Alive   • PGMo     • PGFa     • MAunt  Other   • Fa  Alive   • MGMo  (Not Specified)   • Neg Hx  (Not Specified)         ROS  No fever or chills.  No nausea or vomiting.  No chest pain or palpitations.  No cough or SOB.  No pain with urination or hematuria.  No black or bloody stools.  All other systems reviewed and are negative     Objective:     /80   Pulse 97   Temp (!) 35.8 °C (96.4 °F) (Temporal)   Resp 15   Ht 1.638 m (5' 4.5\")   Wt 111 kg (245 lb)   SpO2 97%  Body mass index is 41.4 kg/m².  Physical Exam:      Well developed, well nourished.  Alert, oriented in no acute distress.  Psych: Eye contact is good, speech goal directed, affect calm  Eyes: conjunctiva non-injected, sclera non-icteric.  Ears: Pinna normal. TM pearly gray.   Nose: Nares are patent.  Normal mucosa  Mouth: Oral mucous membranes pink and moist with no lesions.  Neck Supple.  No adenopathy or masses in the neck or supraclavicular " regions. No thyromegaly  Lungs: clear to auscultation bilaterally with good excursion. No wheezes or rhonchi  CV: regular rate and rhythm. No murmur  Abdomen: soft, nontender, no masses or organomegaly.  No rebound or gaurding  Ext: no edema, color normal, vascularity normal, temperature normal      Assessment and Plan:   The following treatment plan was discussed    1. Essential hypertension  Discussed her blood pressure today but also reviewed her ER visit from last weekend.  Things are much better controlled today on 5 mg of amlodipine side suggest continuing this for now.  We will get some blood work to assess risks for other things as well.  Congratulated on her 5 pounds of weight loss thus far and encouraged to continue with her dietary changes of better protein and veggies less processed foods.  She is seeing cardiology later today and I would defer to them if they wanted to switch any other medications.  Refills of her amlodipine 5 mg are sent in.  She should take this daily.  - Comp Metabolic Panel; Future  - Lipid Profile; Future  - TSH WITH REFLEX TO FT4; Future  - HEMOGLOBIN A1C; Future  - amLODIPine (NORVASC) 5 MG Tab; Take 1 Tablet by mouth every day.  Dispense: 90 Tablet; Refill: 1    2. Screening for cervical cancer  Discussed Pap and mammogram.  She like to see OB/GYN for Pap smear so she is referred for this.  - Referral to OB/Gyn      Records requested.    Followup: No follow-ups on file.

## 2022-01-17 ENCOUNTER — HOSPITAL ENCOUNTER (OUTPATIENT)
Dept: CARDIOLOGY | Facility: MEDICAL CENTER | Age: 57
End: 2022-01-17
Attending: STUDENT IN AN ORGANIZED HEALTH CARE EDUCATION/TRAINING PROGRAM
Payer: COMMERCIAL

## 2022-01-17 DIAGNOSIS — I34.0 MILD MITRAL REGURGITATION: ICD-10-CM

## 2022-01-17 DIAGNOSIS — I10 ESSENTIAL HYPERTENSION: ICD-10-CM

## 2022-01-17 LAB
LV EJECT FRACT MOD 2C 99903: 51.79
LV EJECT FRACT MOD 4C 99902: 52.2
LV EJECT FRACT MOD BP 99901: 53.4

## 2022-01-17 PROCEDURE — 93306 TTE W/DOPPLER COMPLETE: CPT

## 2022-01-17 PROCEDURE — 93306 TTE W/DOPPLER COMPLETE: CPT | Mod: 26 | Performed by: STUDENT IN AN ORGANIZED HEALTH CARE EDUCATION/TRAINING PROGRAM

## 2022-02-17 DIAGNOSIS — I10 ESSENTIAL HYPERTENSION: ICD-10-CM

## 2022-02-17 RX ORDER — AMLODIPINE BESYLATE 5 MG/1
5 TABLET ORAL
Qty: 90 TABLET | Refills: 1 | Status: SHIPPED | OUTPATIENT
Start: 2022-02-17 | End: 2022-07-25 | Stop reason: SDUPTHER

## 2022-03-10 ENCOUNTER — HOSPITAL ENCOUNTER (OUTPATIENT)
Dept: RADIOLOGY | Facility: MEDICAL CENTER | Age: 57
End: 2022-03-10
Attending: FAMILY MEDICINE
Payer: COMMERCIAL

## 2022-03-10 DIAGNOSIS — Z12.31 VISIT FOR SCREENING MAMMOGRAM: ICD-10-CM

## 2022-03-10 PROCEDURE — 77063 BREAST TOMOSYNTHESIS BI: CPT

## 2022-03-28 ENCOUNTER — PATIENT MESSAGE (OUTPATIENT)
Dept: MEDICAL GROUP | Facility: LAB | Age: 57
End: 2022-03-28
Payer: COMMERCIAL

## 2022-03-28 DIAGNOSIS — B00.9 HSV INFECTION: ICD-10-CM

## 2022-03-28 RX ORDER — ACYCLOVIR 800 MG/1
800 TABLET ORAL 3 TIMES DAILY
Qty: 30 TABLET | Refills: 3 | Status: SHIPPED | OUTPATIENT
Start: 2022-03-28 | End: 2023-12-14

## 2022-03-28 NOTE — TELEPHONE ENCOUNTER
From: Vicky Nino  To: Physician Yolanda Murphy  Sent: 3/28/2022 4:18 PM PDT  Subject: Acyclovir renewal    Abdi Guerrero,  Can you renew my Acyclovir RX please? It is  and I had a cold sore pop up on Friday.     Thank you,  Vicky Nino

## 2022-04-21 ASSESSMENT — ENCOUNTER SYMPTOMS
WEAKNESS: 0
DIARRHEA: 0
WHEEZING: 0
PALPITATIONS: 0
NIGHT SWEATS: 0
IRREGULAR HEARTBEAT: 0
ORTHOPNEA: 0
COUGH: 0
SYNCOPE: 0
FEVER: 0
FOCAL WEAKNESS: 0
NAUSEA: 0
PND: 0
ABDOMINAL PAIN: 0
SHORTNESS OF BREATH: 0
NEAR-SYNCOPE: 0
DYSPNEA ON EXERTION: 0
DIZZINESS: 0
VOMITING: 0

## 2022-04-21 NOTE — PROGRESS NOTES
Cardiology Follow-up Consultation Note    Date of note:    04/22/22  Primary Care Provider: Yolanda Murphy M.D.    Patient Name: Vicky Dumont     YOB: 1965  MRN:              [id number]    Chief Complaint: Follow-up hypertension     History of Present Illness: Ms. Vicky Nino is a 56 y.o. female whose current medical problems include hypertension, PVCs, and mild mitral regurgitation who is here for follow-up hypertension.    The patient was last seen in my clinic on 1/13/2022 (her initial visit with me, previously a patient of Dr. Pichardo).  Due to a reported episode of elevated BP, the patient was ordered a treadmill EKG to assess BP response to exercise, which showed borderline abnormal diastolic pressure response, and occasional PVCs and 1 4-beat NSVT, and PVCs otherwise normal.  The patient was ordered a repeat echocardiogram, which showed normal LVEF without any valvular abnormalities.    The patient returns today for follow-up.  The patient has been doing better since last visit.  She has not had any elevated BP at home, although she has not checked recently.  She has been working with diet and exercise, has been feeling well.  She denies any chest pain or shortness of breath on exertion.  No orthopnea, PND, or leg swelling.  No palpitations.  No syncope or presyncopal episodes.    Cardiovascular Risk Factors:  1. Smoking status: Never smoker  2. Type II Diabetes Mellitus: Diet controlled  Lab Results   Component Value Date/Time    HBA1C 6.0 (H) 01/13/2022 10:02 AM    HBA1C 5.7 (H) 05/29/2019 04:10 PM     3. Hypertension: On medication  4. Dyslipidemia: On medication  Lab Results   Component Value Date/Time    CHOLSTRLTOT 234 (H) 01/13/2022 1002    TRIGLYCERIDE 162 (H) 01/13/2022 1002    HDL 54 01/13/2022 1002     (H) 01/13/2022 1002     5. Family history of early Coronary Artery Disease in a first degree relative (Male less than 55 years of age; Female  "less than 65 years of age): None  6.  Obesity and/or Metabolic Syndrome: Body mass index is 40.94 kg/m².  7. Sedentary lifestyle: Active    Review of Systems   Constitutional: Negative for fever, malaise/fatigue and night sweats.   Cardiovascular: Negative for chest pain, dyspnea on exertion, irregular heartbeat, leg swelling, near-syncope, orthopnea, palpitations, paroxysmal nocturnal dyspnea and syncope.   Respiratory: Negative for cough, shortness of breath and wheezing.    Gastrointestinal: Negative for abdominal pain, diarrhea, nausea and vomiting.   Neurological: Negative for dizziness, focal weakness and weakness.       All other systems reviewed and are negative.       Current Outpatient Medications   Medication Sig Dispense Refill   • acyclovir (ZOVIRAX) 800 MG Tab Take 1 Tablet by mouth 3 times a day. TAKE 1 TABLET ORALLY 3 TIMES A DAY 30 Tablet 3   • amLODIPine (NORVASC) 5 MG Tab Take 1 Tablet by mouth every day. 90 Tablet 1   • albuterol 108 (90 Base) MCG/ACT Aero Soln inhalation aerosol INHALE 2 PUFFS BY MOUTH EVERY 6 HOURS AS NEEDED FOR SHORTNESS OF BREATH 8.5 Inhaler 1   • fluticasone (FLONASE) 50 MCG/ACT nasal spray Spray 2 Sprays in nose every day. 1 Bottle 11   • ibuprofen (MOTRIN) 200 MG Tab Take 400 mg by mouth every bedtime.     • multivitamin (THERAGRAN) Tab Take 1 Tab by mouth every day.     • COLLAGEN PO Take  by mouth every day. 1/2 scoop     • Cholecalciferol (VITAMIN D) 1000 UNIT CAPS Take 1 Cap by mouth every day.     • aspirin (ASA) 81 MG CHEW Take 81 mg by mouth every day.       No current facility-administered medications for this visit.       No Known Allergies    Physical Exam:  Ambulatory Vitals  /76 (BP Location: Left arm, Patient Position: Sitting, BP Cuff Size: Adult)   Pulse 88   Resp 16   Ht 1.651 m (5' 5\")   Wt 112 kg (246 lb)   SpO2 96%    Oxygen Therapy:  Pulse Oximetry: 96 %  BP Readings from Last 4 Encounters:   04/22/22 132/76   01/13/22 132/86   01/13/22 " 126/80   01/08/22 131/75       Weight/BMI: Body mass index is 40.94 kg/m².  Wt Readings from Last 4 Encounters:   04/22/22 112 kg (246 lb)   01/13/22 112 kg (246 lb)   01/13/22 111 kg (245 lb)   01/08/22 113 kg (250 lb 3.6 oz)       General: Well appearing and in no apparent distress  Eyes: nl conjunctiva, no icteric sclera  ENT: wearing a mask, normal external appearance of ears  Neck: no visible JVP,  no carotid bruits  Lungs: normal respiratory effort, CTAB  Heart: RRR, no murmurs, no rubs or gallops,  no edema bilateral lower extremities. No LV/RV heave on cardiac palpatation. + bilateral radial pulses.  + bilateral dp pulses.   Abdomen: soft, non tender, non distended, no masses, normal bowel sounds.  No HSM.  Extremities/MSK: no clubbing, no cyanosis  Neurological: No focal sensory deficits  Psychiatric: Appropriate affect, A/O x 3, intact judgement and insight  Skin: Warm extremities      Lab Data Review:  Lab Results   Component Value Date/Time    CHOLSTRLTOT 234 (H) 01/13/2022 10:02 AM     (H) 01/13/2022 10:02 AM    HDL 54 01/13/2022 10:02 AM    TRIGLYCERIDE 162 (H) 01/13/2022 10:02 AM       Lab Results   Component Value Date/Time    SODIUM 137 01/13/2022 10:02 AM    POTASSIUM 4.1 01/13/2022 10:02 AM    CHLORIDE 102 01/13/2022 10:02 AM    CO2 22 01/13/2022 10:02 AM    GLUCOSE 115 (H) 01/13/2022 10:02 AM    BUN 11 01/13/2022 10:02 AM    CREATININE 0.69 01/13/2022 10:02 AM    CREATININE 0.69 04/12/2010 10:45 AM    BUNCREATRAT 13 04/12/2010 10:45 AM    GLOMRATE >59 04/12/2010 10:45 AM     Lab Results   Component Value Date/Time    ALKPHOSPHAT 74 01/13/2022 10:02 AM    ASTSGOT 23 01/13/2022 10:02 AM    ALTSGPT 46 01/13/2022 10:02 AM    TBILIRUBIN 0.4 01/13/2022 10:02 AM      Lab Results   Component Value Date/Time    WBC 6.2 05/29/2019 04:10 PM     Lab Results   Component Value Date/Time    HBA1C 6.0 (H) 01/13/2022 10:02 AM    HBA1C 5.7 (H) 05/29/2019 04:10 PM         Cardiac Imaging and Procedures  Review:    EKG dated 5/29/2019: My personal interpretation is sinus rhythm, first-degree AV block, poor R wave progression    EKG dated 1/13/2022: My personal interpretation is sinus rhythm, low voltage in precordial leads    Echo dated 1/17/2022  CONCLUSIONS  Normal left ventricular systolic function. The left ventricular   ejection fraction is visually estimated to be 55%.   Normal right ventricular size and systolic function.  No significant valvular abnormalities.  No recent echocardiogram for comparison.    Echo dated 11/21/2013:   CONCLUSIONS   Normal left ventricular size, thickness, systolic function, and   diastolic function.   Mild mitral regurgitation.   Trace tricuspid regurgitation.   Otherwise normal study.        Nuclear Perfusion Imaging (11/22/2013):   Impression    1.  Left ventricular ejection fraction 64%.   2.  Matched mildly decreased activity inferior wall left ventricle   consistent with attenuation artifact. No reversible ischemia identified.      Treadmill EKG -from [place] 3/3/2022  Jono protocol  Exercise time: 6:01; 7.0 METS  Peak HR = 164 = 99% of MPHR for age  Peak BP = 186/110 = RPP = 28,674  -Test termination indication not reported.  No reported chest pain    Conclusion:  -Negative maximal exercise treadmill test for ischemia; occasional PVCs and 1X4 beat episode of NSVT.  -Functional class I  -Borderline abnormal diastolic blood pressure response      Assessment & Plan     1. Essential hypertension     2. Mild mitral regurgitation     3. Dyslipidemia           Shared Medical Decision Making:    Hypertension  BP well controlled this visit  -Continue amlodipine 5 mg daily    Mild mitral regurgitation  Euvolemic on exam.  Repeat echocardiogram shows no evidence of mitral regurgitation    Dyslipidemia  The 10-year ASCVD risk score (Amy SAMIA Jr., et al., 2013) is: 2.5%  -Discussed diet control and statin initiation.  The patient plans to work on diet control first and repeat labs in  6 months with PCP.     All of patient's excellent questions were answered to the best of my knowledge and to heart satisfaction.  It was a pleasure seeing Ms. Vicky Nino in my clinic today. Return in about 1 year (around 4/22/2023), or if symptoms worsen or fail to improve. Patient is aware to call the cardiology clinic with any questions or concerns.      Belle Rao MD  [facility] [address]  [address] [address]  Phone: [phone]  Fax: [phone]

## 2022-04-22 ENCOUNTER — OFFICE VISIT (OUTPATIENT)
Dept: CARDIOLOGY | Facility: MEDICAL CENTER | Age: 57
End: 2022-04-22
Payer: COMMERCIAL

## 2022-04-22 VITALS
HEART RATE: 88 BPM | WEIGHT: 246 LBS | RESPIRATION RATE: 16 BRPM | HEIGHT: 65 IN | DIASTOLIC BLOOD PRESSURE: 76 MMHG | BODY MASS INDEX: 40.98 KG/M2 | OXYGEN SATURATION: 96 % | SYSTOLIC BLOOD PRESSURE: 132 MMHG

## 2022-04-22 DIAGNOSIS — I34.0 MILD MITRAL REGURGITATION: ICD-10-CM

## 2022-04-22 DIAGNOSIS — E78.5 DYSLIPIDEMIA: ICD-10-CM

## 2022-04-22 DIAGNOSIS — I10 ESSENTIAL HYPERTENSION: ICD-10-CM

## 2022-04-22 PROCEDURE — 99214 OFFICE O/P EST MOD 30 MIN: CPT | Performed by: STUDENT IN AN ORGANIZED HEALTH CARE EDUCATION/TRAINING PROGRAM

## 2022-07-25 DIAGNOSIS — I10 ESSENTIAL HYPERTENSION: ICD-10-CM

## 2022-07-25 RX ORDER — AMLODIPINE BESYLATE 5 MG/1
5 TABLET ORAL
Qty: 90 TABLET | Refills: 0 | Status: SHIPPED | OUTPATIENT
Start: 2022-07-25 | End: 2022-07-26 | Stop reason: SDUPTHER

## 2022-07-26 DIAGNOSIS — I10 ESSENTIAL HYPERTENSION: ICD-10-CM

## 2022-07-26 RX ORDER — AMLODIPINE BESYLATE 5 MG/1
5 TABLET ORAL
Qty: 90 TABLET | Refills: 0 | Status: SHIPPED | OUTPATIENT
Start: 2022-07-26 | End: 2022-08-18 | Stop reason: SDUPTHER

## 2022-08-18 ENCOUNTER — HOSPITAL ENCOUNTER (OUTPATIENT)
Facility: MEDICAL CENTER | Age: 57
End: 2022-08-18
Attending: FAMILY MEDICINE
Payer: COMMERCIAL

## 2022-08-18 ENCOUNTER — OFFICE VISIT (OUTPATIENT)
Dept: MEDICAL GROUP | Facility: LAB | Age: 57
End: 2022-08-18
Payer: COMMERCIAL

## 2022-08-18 ENCOUNTER — HOSPITAL ENCOUNTER (OUTPATIENT)
Dept: LAB | Facility: MEDICAL CENTER | Age: 57
End: 2022-08-18
Attending: FAMILY MEDICINE
Payer: COMMERCIAL

## 2022-08-18 VITALS
TEMPERATURE: 98.1 F | BODY MASS INDEX: 36.65 KG/M2 | WEIGHT: 220 LBS | SYSTOLIC BLOOD PRESSURE: 124 MMHG | HEIGHT: 65 IN | HEART RATE: 71 BPM | DIASTOLIC BLOOD PRESSURE: 82 MMHG | OXYGEN SATURATION: 95 % | RESPIRATION RATE: 12 BRPM

## 2022-08-18 DIAGNOSIS — I48.19 PERSISTENT ATRIAL FIBRILLATION (HCC): ICD-10-CM

## 2022-08-18 DIAGNOSIS — E78.5 HYPERLIPIDEMIA, UNSPECIFIED HYPERLIPIDEMIA TYPE: ICD-10-CM

## 2022-08-18 DIAGNOSIS — R73.01 ELEVATED FASTING GLUCOSE: ICD-10-CM

## 2022-08-18 DIAGNOSIS — I49.9 IRREGULAR HEART BEAT: ICD-10-CM

## 2022-08-18 DIAGNOSIS — I10 ESSENTIAL HYPERTENSION: ICD-10-CM

## 2022-08-18 LAB
ANION GAP SERPL CALC-SCNC: 12 MMOL/L (ref 7–16)
BUN SERPL-MCNC: 10 MG/DL (ref 8–22)
CALCIUM SERPL-MCNC: 10 MG/DL (ref 8.5–10.5)
CHLORIDE SERPL-SCNC: 107 MMOL/L (ref 96–112)
CHOLEST SERPL-MCNC: 172 MG/DL (ref 100–199)
CO2 SERPL-SCNC: 25 MMOL/L (ref 20–33)
CREAT SERPL-MCNC: 0.77 MG/DL (ref 0.5–1.4)
EST. AVERAGE GLUCOSE BLD GHB EST-MCNC: 103 MG/DL
FASTING STATUS PATIENT QL REPORTED: NORMAL
GFR SERPLBLD CREATININE-BSD FMLA CKD-EPI: 90 ML/MIN/1.73 M 2
GLUCOSE SERPL-MCNC: 126 MG/DL (ref 65–99)
HBA1C MFR BLD: 5.2 % (ref 4–5.6)
HDLC SERPL-MCNC: 54 MG/DL
LDLC SERPL CALC-MCNC: 93 MG/DL
POTASSIUM SERPL-SCNC: 4.4 MMOL/L (ref 3.6–5.5)
SODIUM SERPL-SCNC: 144 MMOL/L (ref 135–145)
TRIGL SERPL-MCNC: 127 MG/DL (ref 0–149)
TSH SERPL DL<=0.005 MIU/L-ACNC: 1.01 UIU/ML (ref 0.38–5.33)

## 2022-08-18 PROCEDURE — U0003 INFECTIOUS AGENT DETECTION BY NUCLEIC ACID (DNA OR RNA); SEVERE ACUTE RESPIRATORY SYNDROME CORONAVIRUS 2 (SARS-COV-2) (CORONAVIRUS DISEASE [COVID-19]), AMPLIFIED PROBE TECHNIQUE, MAKING USE OF HIGH THROUGHPUT TECHNOLOGIES AS DESCRIBED BY CMS-2020-01-R: HCPCS

## 2022-08-18 PROCEDURE — 99214 OFFICE O/P EST MOD 30 MIN: CPT | Performed by: FAMILY MEDICINE

## 2022-08-18 PROCEDURE — U0005 INFEC AGEN DETEC AMPLI PROBE: HCPCS

## 2022-08-18 PROCEDURE — 83036 HEMOGLOBIN GLYCOSYLATED A1C: CPT

## 2022-08-18 PROCEDURE — 84443 ASSAY THYROID STIM HORMONE: CPT

## 2022-08-18 PROCEDURE — 36415 COLL VENOUS BLD VENIPUNCTURE: CPT

## 2022-08-18 PROCEDURE — 80048 BASIC METABOLIC PNL TOTAL CA: CPT

## 2022-08-18 PROCEDURE — 80061 LIPID PANEL: CPT

## 2022-08-18 RX ORDER — METOPROLOL SUCCINATE 25 MG/1
25 TABLET, EXTENDED RELEASE ORAL DAILY
Qty: 90 TABLET | Refills: 3 | Status: SHIPPED | OUTPATIENT
Start: 2022-08-18 | End: 2023-08-17

## 2022-08-18 RX ORDER — AMLODIPINE BESYLATE 5 MG/1
2.5 TABLET ORAL
Qty: 90 TABLET | Refills: 0 | Status: SHIPPED | OUTPATIENT
Start: 2022-08-18 | End: 2022-11-28

## 2022-08-18 NOTE — PROGRESS NOTES
Subjective:     Chief Complaint   Patient presents with    Follow-Up     labs         HPI:   Vicky presents today with follow up of labs and heart , HTN.   This past few days has had 3 A-fib notifications. More palpitations of late. At rest mostly.  She also reports a mild sore throat starting today and was extremely fatigued yesterday and fell asleep very early after work.  No cough, no congestion at this time.    Clean eating. Smaller portions, paddle boarding, losing weight.  She is lost at least 25 pounds, possibly more.  Otherwise has been doing very well.      Current Outpatient Medications Ordered in Epic   Medication Sig Dispense Refill    rivaroxaban (XARELTO) 20 MG Tab tablet Take 1 Tablet by mouth with dinner. 30 Tablet 2    metoprolol SR (TOPROL XL) 25 MG TABLET SR 24 HR Take 1 Tablet by mouth every day. 90 Tablet 3    amLODIPine (NORVASC) 5 MG Tab Take 0.5 Tablets by mouth every day. 90 Tablet 0    acyclovir (ZOVIRAX) 800 MG Tab Take 1 Tablet by mouth 3 times a day. TAKE 1 TABLET ORALLY 3 TIMES A DAY 30 Tablet 3    albuterol 108 (90 Base) MCG/ACT Aero Soln inhalation aerosol INHALE 2 PUFFS BY MOUTH EVERY 6 HOURS AS NEEDED FOR SHORTNESS OF BREATH 8.5 Inhaler 1    fluticasone (FLONASE) 50 MCG/ACT nasal spray Spray 2 Sprays in nose every day. 1 Bottle 11    multivitamin (THERAGRAN) Tab Take 1 Tab by mouth every day.      COLLAGEN PO Take  by mouth every day. 1/2 scoop      Cholecalciferol (VITAMIN D) 1000 UNIT CAPS Take 1 Cap by mouth every day.       No current [facility]-ordered facility-administered medications on file.         ROS:  Gen: no fevers/chills, no changes in weight  Eyes: no changes in vision  ENT: no sore throat, no hearing loss, no bloody nose  Pulm: no sob, no cough  CV: no chest pain, no palpitations  GI: no nausea/vomiting, no diarrhea  : no dysuria  MSk: no myalgias  Skin: no rash  Neuro: no headaches, no numbness/tingling  Heme/Lymph: no easy bruising      Objective:     Exam:  /82   " Pulse 71   Temp 36.7 °C (98.1 °F)   Resp 12   Ht 1.638 m (5' 4.5\")   Wt 99.8 kg (220 lb)   LMP 09/18/2013   SpO2 95%   BMI 37.18 kg/m²  Body mass index is 37.18 kg/m².    Gen: Alert and oriented, No apparent distress.  Neck: Neck is supple without lymphadenopathy.  Lungs: Normal effort, CTA bilaterally, no wheezes, rhonchi, or rales  CV: irRegular rate and rhythm. No murmurs, rubs, or gallops.  Ext: No clubbing, cyanosis, edema.      Assessment & Plan:     57 y.o. female with the following -   1. Elevated fasting glucose  Discussed history of elevated A1c.  We will get her rechecked.  With her weight loss this is likely going to be much better.  - Basic Metabolic Panel; Future  - HEMOGLOBIN A1C; Future    2. Hyperlipidemia, unspecified hyperlipidemia type  Will get cholesterol rechecked.  - Lipid Profile; Future    3. Irregular heart beat    - EKG - Clinic Performed    4. Persistent atrial fibrillation (HCC)  EKG does show A. fib in clinic today.  Rate on EKG is 137 but she does alternate in the 70s as well on exam.  QT interval is slightly prolonged at 464.  We did discuss persistent A. fib and what some sources of this might be.  We will make sure we get her swab for COVID as this could be related but also get some labs to ensure that there is no other underlying source to treat.  She is started today on rhythm control with metoprolol as well as a blood thinner.  She will go ahead and half her amlodipine so as not to drop her blood pressure too low.  She will let us know if she has any issues or low pressures at home.  We will try to get her into cardiology soon as possible.  - TSH WITH REFLEX TO FT4; Future  - rivaroxaban (XARELTO) 20 MG Tab tablet; Take 1 Tablet by mouth with dinner.  Dispense: 30 Tablet; Refill: 2  - metoprolol SR (TOPROL XL) 25 MG TABLET SR 24 HR; Take 1 Tablet by mouth every day.  Dispense: 90 Tablet; Refill: 3  - SARS-CoV-2 PCR (24 hour In-House): Collect NP swab in [facility]; Future  - " REFERRAL TO CARDIOLOGY    5. Essential hypertension  Reduce to half a tab since we are starting metoprolol.  - amLODIPine (NORVASC) 5 MG Tab; Take 0.5 Tablets by mouth every day.  Dispense: 90 Tablet; Refill: 0            No follow-ups on file.    Please note that this dictation was created using voice recognition software. I have made every reasonable attempt to correct obvious errors, but I expect that there are errors of grammar and possibly content that I did not discover before finalizing the note.

## 2022-08-19 DIAGNOSIS — I48.19 PERSISTENT ATRIAL FIBRILLATION (HCC): ICD-10-CM

## 2022-08-19 LAB
COVID ORDER STATUS COVID19: NORMAL
SARS-COV-2 RNA RESP QL NAA+PROBE: NOTDETECTED
SPECIMEN SOURCE: NORMAL

## 2022-08-26 ENCOUNTER — OFFICE VISIT (OUTPATIENT)
Dept: CARDIOLOGY | Facility: MEDICAL CENTER | Age: 57
End: 2022-08-26
Payer: COMMERCIAL

## 2022-08-26 ENCOUNTER — HOSPITAL ENCOUNTER (OUTPATIENT)
Facility: MEDICAL CENTER | Age: 57
End: 2022-08-26
Attending: INTERNAL MEDICINE | Admitting: INTERNAL MEDICINE
Payer: COMMERCIAL

## 2022-08-26 ENCOUNTER — TELEPHONE (OUTPATIENT)
Dept: CARDIOLOGY | Facility: MEDICAL CENTER | Age: 57
End: 2022-08-26

## 2022-08-26 VITALS
WEIGHT: 224 LBS | RESPIRATION RATE: 16 BRPM | OXYGEN SATURATION: 99 % | BODY MASS INDEX: 38.24 KG/M2 | HEART RATE: 114 BPM | HEIGHT: 64 IN | DIASTOLIC BLOOD PRESSURE: 78 MMHG | SYSTOLIC BLOOD PRESSURE: 114 MMHG

## 2022-08-26 DIAGNOSIS — I34.0 MILD MITRAL REGURGITATION: ICD-10-CM

## 2022-08-26 DIAGNOSIS — I48.19 PERSISTENT ATRIAL FIBRILLATION (HCC): ICD-10-CM

## 2022-08-26 DIAGNOSIS — J34.2 DEVIATED SEPTUM: ICD-10-CM

## 2022-08-26 DIAGNOSIS — Z79.899 HIGH RISK MEDICATION USE: ICD-10-CM

## 2022-08-26 DIAGNOSIS — I10 ESSENTIAL HYPERTENSION: ICD-10-CM

## 2022-08-26 DIAGNOSIS — E78.5 HYPERLIPIDEMIA, UNSPECIFIED HYPERLIPIDEMIA TYPE: ICD-10-CM

## 2022-08-26 LAB — EKG IMPRESSION: NORMAL

## 2022-08-26 PROCEDURE — 99215 OFFICE O/P EST HI 40 MIN: CPT | Performed by: NURSE PRACTITIONER

## 2022-08-26 PROCEDURE — 93000 ELECTROCARDIOGRAM COMPLETE: CPT | Performed by: INTERNAL MEDICINE

## 2022-08-26 ASSESSMENT — ENCOUNTER SYMPTOMS
WEIGHT LOSS: 0
FEVER: 0
ABDOMINAL PAIN: 0
BLURRED VISION: 0
TINGLING: 0
PND: 0
NAUSEA: 0
PALPITATIONS: 1
SHORTNESS OF BREATH: 0
BRUISES/BLEEDS EASILY: 0
DIZZINESS: 0
VOMITING: 0
LOSS OF CONSCIOUSNESS: 0
BLOOD IN STOOL: 0
MYALGIAS: 0
FALLS: 0
CLAUDICATION: 0
ORTHOPNEA: 0
COUGH: 0

## 2022-08-26 NOTE — PROGRESS NOTES
Chief Complaint   Patient presents with    Hypertension    Hyperlipidemia    Atrial Fibrillation       Subjective     Vicky Nino is a 57 y.o. female who presents today for cardiac follow-up regarding her new A. fib at her PCP's office 8/18/2022.  Patient's medical problems include PVCs, mild MR, HLD.  Patient was last seen by Dr. Rao on 4/22/2022.    Today, patient reports that her Apple Watch alerted her last week regarding that she may begin A. fib.  She saw her primary care provider and confirmed that she was in A. fib via EKG she was initiated on OAC and beta-blocker therapy.  Patient reports in 2018 she was suggested to have her deviated septum corrected.  Patient does not know if she snores.  Patient has lost 25 pounds since last seen by Dr. Joe.  Patient reports minimal alcohol use.  Patient does note that she is more fatigued and not losing weight since starting metoprolol every evening.  We discussed continuing metoprolol for rate control in the meantime with additional dose as needed for persistent heart rate above 130.  Demonstration was provided to patient on how to monitor heart rate while in irregular rhythm.  Otherwise, Patient denies chest pain, shortness of breath, dizziness/lightheadedness, orthopnea, PND or Edema.     EKG in office personally interpreted by me as A. fib     Patient does appear euvolemic on exam.  Patient endorses compliance with his Xarelto.  We discussed the importance of continuing for another 3 weeks prior to direct-current cardioversion.     The risks, benefits, and alternatives to electrical cardioversion were discussed. Conversion of atrial fibrillation to normal rhythm, at least transiently, is successful in 90 to 95% of patients, maintaining a normal rhythm depends on a number of factors, including underlying heart disease and antiarrhythmic medications. Atrial fibrillation often recurs with time and other treatments may be necessary. Risks of  cardioversion are  low as long as anticoagulation issues are handled appropriately. There is a small (less than 1%) risk of embolic events, including stroke. Risks of electrical shock include mild muscle soreness and mild skin burning at the site of electrode placement. There is also a risk that cardioversion can stimulate more dangerous arrhythmias. The patient verbalized understanding of these potential complications and wishes to proceed with this procedure.    The risks, benefits, and alternatives to transesophageal echocardiogram with IV sedation were discussed with the patient in specific detail, including oropharyngeal and esophageal traumas including hoarseness and dysphagia after the procedure. Rare cases demonstrating serious or fatal complications associated with transesophageal echocardiogram have been reported in the adult population, including cardiac, pulmonary and bleeding complications in less than 1% of people. Patients with an identified intracardiac thrombus are at increased risk for embolic events and this appears to be reduced with anticoagulant therapy. The patient verbalized understandings about these  possible complications and wishes to proceed with this procedure    We will also further evaluate risk factors associated with new persistent A. fib with O p.o. and updated echocardiogram.  Patient verbalizes understanding and is agreeable to plan of care.    Past Medical History:   Diagnosis Date    Arrhythmia     rapid heart rate     Asthma     has not used inhaler in a year    Chest discomfort 11/18/2013    Depression     High cholesterol     History of anemia     Hypertension     Obesity     Palpitations 11/18/2013    Psychiatric disorder     anxiety    Snoring     Ventricular bigeminy 11/18/2013     Past Surgical History:   Procedure Laterality Date    KNEE ARTHROSCOPY Right 6/3/2019    Procedure: ARTHROSCOPY, KNEE;  Surgeon: Jag Gomez M.D.;  Location: SURGERY [facility];  Service: Orthopedics     MENISCECTOMY, KNEE, MEDIAL Right 6/3/2019    Procedure: MENISCECTOMY, KNEE, MEDIAL, LATERAL-  PARTIAL;  Surgeon: Jag Gomez M.D.;  Location: SURGERY [facility];  Service: Orthopedics    CHONDROPLASTY Right 6/3/2019    Procedure: CHONDROPLASTY- patella;  Surgeon: Jag Gomez M.D.;  Location: SURGERY [facility];  Service: Orthopedics    COLONOSCOPY       Family History   Problem Relation Age of Onset    Diabetes Mother     Genitourinary () Problems Mother         renal failure, kidey transplant    Hypertension Mother     Heart Disease Mother     Thyroid Sister         2 with removal due to Graves    Hypertension Brother     Cancer Brother 60        colon cancer     Cancer Paternal Grandmother         colon    Cancer Paternal Grandfather         lung    Cancer Maternal Aunt     Hypertension Father     Cancer Maternal Grandmother         breast     Social History     Socioeconomic History    Marital status:      Spouse name: Not on file    Number of children: Not on file    Years of education: Not on file    Highest education level: Not on file   Occupational History    Not on file   Tobacco Use    Smoking status: Never    Smokeless tobacco: Never   Vaping Use    Vaping Use: Never used   Substance and Sexual Activity    Alcohol use: Yes     Alcohol/week: 0.0 - 8.4 oz     Comment: 3-4x a week    Drug use: No    Sexual activity: Not Currently     Partners: Male   Other Topics Concern    Not on file   Social History Narrative    Not on file     Social Determinants of Health     Financial Resource Strain: Not on file   Food Insecurity: Not on file   Transportation Needs: Not on file   Physical Activity: Not on file   Stress: Not on file   Social Connections: Not on file   Intimate Partner Violence: Not on file   Housing Stability: Not on file     No Known Allergies  Outpatient Encounter Medications as of 8/26/2022   Medication Sig Dispense Refill    rivaroxaban (XARELTO) 20 MG Tab tablet Take 1  "Tablet by mouth with dinner. 30 Tablet 2    metoprolol SR (TOPROL XL) 25 MG TABLET SR 24 HR Take 1 Tablet by mouth every day. 90 Tablet 3    amLODIPine (NORVASC) 5 MG Tab Take 0.5 Tablets by mouth every day. 90 Tablet 0    acyclovir (ZOVIRAX) 800 MG Tab Take 1 Tablet by mouth 3 times a day. TAKE 1 TABLET ORALLY 3 TIMES A DAY 30 Tablet 3    albuterol 108 (90 Base) MCG/ACT Aero Soln inhalation aerosol INHALE 2 PUFFS BY MOUTH EVERY 6 HOURS AS NEEDED FOR SHORTNESS OF BREATH 8.5 Inhaler 1    fluticasone (FLONASE) 50 MCG/ACT nasal spray Spray 2 Sprays in nose every day. 1 Bottle 11    multivitamin (THERAGRAN) Tab Take 1 Tab by mouth every day.      COLLAGEN PO Take  by mouth every day. 1/2 scoop      Cholecalciferol (VITAMIN D) 1000 UNIT CAPS Take 1 Cap by mouth every day.       No facility-administered encounter medications on file as of 8/26/2022.     Review of Systems   Constitutional:  Negative for fever, malaise/fatigue and weight loss.   Eyes:  Negative for blurred vision.   Respiratory:  Negative for cough and shortness of breath.    Cardiovascular:  Positive for palpitations. Negative for chest pain, orthopnea, claudication, leg swelling and PND.   Gastrointestinal:  Negative for abdominal pain, blood in stool, nausea and vomiting.   Genitourinary:  Negative for dysuria, frequency and hematuria.   Musculoskeletal:  Negative for falls and myalgias.   Neurological:  Negative for dizziness, tingling and loss of consciousness.   Endo/Heme/Allergies:  Does not bruise/bleed easily.            Objective     /78 (BP Location: Left arm, Patient Position: Sitting, BP Cuff Size: Adult)   Pulse (!) 114   Resp 16   Ht 1.626 m (5' 4\")   Wt 102 kg (224 lb)   LMP 09/18/2013   SpO2 99%   BMI 38.45 kg/m²     Physical Exam  Vitals reviewed.   Constitutional:       Appearance: She is well-developed.   HENT:      Head: Normocephalic and atraumatic.   Eyes:      Pupils: Pupils are equal, round, and reactive to light. "   Neck:      Vascular: No JVD.   Cardiovascular:      Rate and Rhythm: Tachycardia present. Rhythm irregular.      Heart sounds: Normal heart sounds. No murmur heard.    No friction rub. No gallop.   Pulmonary:      Effort: Pulmonary effort is normal. No respiratory distress.      Breath sounds: Normal breath sounds.   Abdominal:      General: Bowel sounds are normal. There is no distension.      Palpations: Abdomen is soft.   Musculoskeletal:      Right lower leg: No edema.      Left lower leg: No edema.   Skin:     General: Skin is warm and dry.      Findings: No erythema.   Neurological:      General: No focal deficit present.      Mental Status: She is alert and oriented to person, place, and time.   Psychiatric:         Behavior: Behavior normal.          EKG dated 1/13/2022: My personal interpretation is sinus rhythm, low voltage in precordial leads     Echo dated 1/17/2022  CONCLUSIONS  Normal left ventricular systolic function. The left ventricular   ejection fraction is visually estimated to be 55%.   Normal right ventricular size and systolic function.  No significant valvular abnormalities.  No recent echocardiogram for comparison.     Echo dated 11/21/2013:   CONCLUSIONS   Normal left ventricular size, thickness, systolic function, and   diastolic function.   Mild mitral regurgitation.   Trace tricuspid regurgitation.   Otherwise normal study.       Nuclear Perfusion Imaging (11/22/2013):   Impression    1.  Left ventricular ejection fraction 64%.   2.  Matched mildly decreased activity inferior wall left ventricle   consistent with attenuation artifact. No reversible ischemia identified.     Treadmill EKG -from [facility] 3/3/2022  Jono protocol  Exercise time: 6:01; 7.0 METS  Peak HR = 164 = 99% of MPHR for age  Peak BP = 186/110 = RPP = 28,674  -Test termination indication not reported.  No reported chest pain     Conclusion:  -Negative maximal exercise treadmill test for ischemia; occasional PVCs  and 1X4 beat episode of NSVT.  -Functional class I  -Borderline abnormal diastolic blood pressure response    Assessment & Plan     1. Essential hypertension  EKG    OVERNIGHT PULSE OXIMETRY    CL-CARDIOVERSION    EC-KEM W/O CONT    EC-ECHOCARDIOGRAM COMPLETE W/O CONT      2. Hyperlipidemia, unspecified hyperlipidemia type  OVERNIGHT PULSE OXIMETRY    CL-CARDIOVERSION    EC-KEM W/O CONT    EC-ECHOCARDIOGRAM COMPLETE W/O CONT      3. High risk medication use  OVERNIGHT PULSE OXIMETRY    CL-CARDIOVERSION    EC-KEM W/O CONT    EC-ECHOCARDIOGRAM COMPLETE W/O CONT      4. Persistent atrial fibrillation (HCC)  OVERNIGHT PULSE OXIMETRY    CL-CARDIOVERSION    EC-KEM W/O CONT    EC-ECHOCARDIOGRAM COMPLETE W/O CONT          Medical Decision Making: Today's Assessment/Status/Plan:        New onset persistent atrial fibrillation; deviated septum; possible snoring  -Rhythm and Rate: A. fib with ventricular rate 111  -Symptoms: Positional palpitations  -[id number] Score (CHF/CM, HTN, Age, DM, CVA, Vascular disease, Gender):  2 at this time.  Agreeable to continue Xarelto prior to cardioversion.  -Risk Factors: Normal thyroid function, no family history, history of excessive EtOH for short period of time 4 years ago.  Deviated septum currently uncorrected  -Further Testing Recommended: O p.o., echocardiogram  -Medications: Continue metoprolol XL 25 mg every evening.  Discussed taking additional dose as needed for persistent heart rate above 130  -Anticoagulation: Xarelto started last week  -Rate v. Rhythm Control: Rate control with planned DCCV after 4 weeks of OAC    Hypertension  BP well controlled this visit  -Continue amlodipine 2.5 mg daily     Mild mitral regurgitation  Euvolemic on exam.       Dyslipidemia  The 10-year ASCVD risk score (Petersonalexy GRACIA Jr., et al., 2013) is: 2.1%    Values used to calculate the score:      Age: 57 years      Sex: Female      Is Non- : No      Diabetic: No      Tobacco  smoker: No      Systolic Blood Pressure: 114 mmHg      Is BP treated: Yes      HDL Cholesterol: 54 mg/dL      Total Cholesterol: 172 mg/dL  -LDL 93  -Congratulated on positive lifestyle changes    FU in clinic in 6 weeks.  Sooner if needed.    Patient verbalizes understanding and agrees with the plan of care.     I personally spent a total of 43 minutes which includes face-to-face time and non-face-to-face time spent on preparing to see the patient, reviewing prior notes and tests, obtaining history from the patient, performing a medically appropriate exam, counseling and educating the patient, ordering medications/tests/procedures/referrals as clinically indicated, and documenting information in the electronic medical record.    HIREN GarciaRSAYRA.   [facility]  [phone]    PLEASE NOTE: This Note was created using voice recognition Software. I have made every reasonable attempt to correct obvious errors, but I expect that there are errors of grammar and possibly content that I did not discover before finalizing the note

## 2022-08-26 NOTE — TELEPHONE ENCOUNTER
Patient is scheduled on 9-22-22 for a KEM/CV w/anesthesia with Dr. Toth. Jocelyn meds to stop and patient to check in at 9:00 for an 11:00 procedure. H&P was done on 8-26-22 by Loly Pradhan. Pre admit to call patient.

## 2022-08-26 NOTE — TELEPHONE ENCOUNTER
GRETA Pradhan order OPO. OPO has been fax to [facility] .    Fax Number: [phone]  Phone Number [phone]    Confirmation of fax has been received and scan to [facility].

## 2022-08-26 NOTE — TELEPHONE ENCOUNTER
----- Message from GREGORY Garcia sent at 8/26/2022 10:51 AM PDT -----  Regarding: KEM/DCCV  Patient of Dr. Rao.  Agreeable to KEM with possible cardioversion in 3 weeks.  It with Dr. Rao is available that is great.  Otherwise, any provider.

## 2022-08-30 ENCOUNTER — HOSPITAL ENCOUNTER (OUTPATIENT)
Dept: CARDIOLOGY | Facility: MEDICAL CENTER | Age: 57
End: 2022-08-30
Attending: NURSE PRACTITIONER
Payer: COMMERCIAL

## 2022-08-30 DIAGNOSIS — Z79.899 HIGH RISK MEDICATION USE: ICD-10-CM

## 2022-08-30 DIAGNOSIS — I48.19 PERSISTENT ATRIAL FIBRILLATION (HCC): ICD-10-CM

## 2022-08-30 DIAGNOSIS — E78.5 HYPERLIPIDEMIA, UNSPECIFIED HYPERLIPIDEMIA TYPE: ICD-10-CM

## 2022-08-30 DIAGNOSIS — I10 ESSENTIAL HYPERTENSION: ICD-10-CM

## 2022-08-30 LAB
LV EJECT FRACT  99904: 60
LV EJECT FRACT MOD 4C 99902: 61.82

## 2022-08-30 PROCEDURE — 93306 TTE W/DOPPLER COMPLETE: CPT | Mod: 26 | Performed by: INTERNAL MEDICINE

## 2022-08-30 PROCEDURE — 93306 TTE W/DOPPLER COMPLETE: CPT

## 2022-09-06 ENCOUNTER — PATIENT MESSAGE (OUTPATIENT)
Dept: MEDICAL GROUP | Facility: LAB | Age: 57
End: 2022-09-06
Payer: COMMERCIAL

## 2022-09-06 DIAGNOSIS — J34.2 DEVIATED SEPTUM: ICD-10-CM

## 2022-09-12 ENCOUNTER — NON-PROVIDER VISIT (OUTPATIENT)
Dept: CARDIOLOGY | Facility: MEDICAL CENTER | Age: 57
End: 2022-09-12
Payer: COMMERCIAL

## 2022-09-12 DIAGNOSIS — J34.2 DEVIATED SEPTUM: ICD-10-CM

## 2022-09-12 DIAGNOSIS — I48.19 PERSISTENT ATRIAL FIBRILLATION (HCC): ICD-10-CM

## 2022-09-12 DIAGNOSIS — Z79.899 HIGH RISK MEDICATION USE: ICD-10-CM

## 2022-09-12 LAB — EKG IMPRESSION: NORMAL

## 2022-09-12 PROCEDURE — 93000 ELECTROCARDIOGRAM COMPLETE: CPT | Performed by: INTERNAL MEDICINE

## 2022-09-12 PROCEDURE — 99999 PR NO CHARGE: CPT | Performed by: INTERNAL MEDICINE

## 2022-09-12 NOTE — Clinical Note
Sinus rhythm on recent EKG.  Patient may cancel KEM and cardioversion for 9/22.  LVM for patient to call office back with updates. Please have scheduling reach out to patient for cardiac event monitor placement.

## 2022-09-12 NOTE — PROGRESS NOTES
Patient was here today for EKG per JG. EKG performed and transferred into patients chart. Paper copy of EKG given to JUDY Dodd for JG. Patient has been informed that Yousif KIM, will be in shortly to speak about EKG.

## 2022-09-12 NOTE — NON-PROVIDER
"Went over EKG and AFib. Pt is currently in SR. Cancelling cardioversion.     Pt did note that she has had paroxysmal Afib- occurs mostly when exercising.     Does of feeling of \"hot flash\", sweating, HR was up to 190 last night per her apple watch.     Discussed out increased stress on her heart can lead to episodes but nothing has sustained and once she rests her Heart rate goes below 100 or self converts.     Went over instruction of when to take extra metoprolol vs ER precautions.     Printed generalized information about Afib.     Discussed referral to EP for management. Discussed possible Holter monitor to assess Afib burden pending  approval. Also gave number to [facility] to complete OPO.   "

## 2022-09-19 ENCOUNTER — TELEPHONE (OUTPATIENT)
Dept: CARDIOLOGY | Facility: MEDICAL CENTER | Age: 57
End: 2022-09-19
Payer: COMMERCIAL

## 2022-09-19 ENCOUNTER — PATIENT MESSAGE (OUTPATIENT)
Dept: CARDIOLOGY | Facility: MEDICAL CENTER | Age: 57
End: 2022-09-19
Payer: COMMERCIAL

## 2022-09-19 NOTE — PROGRESS NOTES
Reviewed EKG.  Patient self converted to sinus rhythm.  Patient may cancel KEM and cardioversion.    Will order 2-week cardiac event monitor    No changes to medications at this time.

## 2022-09-20 ENCOUNTER — PATIENT MESSAGE (OUTPATIENT)
Dept: CARDIOLOGY | Facility: MEDICAL CENTER | Age: 57
End: 2022-09-20
Payer: COMMERCIAL

## 2022-09-20 ENCOUNTER — TELEPHONE (OUTPATIENT)
Dept: CARDIOLOGY | Facility: MEDICAL CENTER | Age: 57
End: 2022-09-20
Payer: COMMERCIAL

## 2022-09-20 DIAGNOSIS — J34.2 DEVIATED SEPTUM: ICD-10-CM

## 2022-09-20 DIAGNOSIS — I48.19 PERSISTENT ATRIAL FIBRILLATION (HCC): ICD-10-CM

## 2022-09-20 NOTE — TELEPHONE ENCOUNTER
----- Message from GREGORY Garcia sent at 9/19/2022 11:50 AM PDT -----  Sinus rhythm on recent EKG.  Patient may cancel KEM and cardioversion for 9/22.  LVM for patient to call office back with updates.  Please have scheduling reach out to patient for cardiac event monitor placement.

## 2022-09-20 NOTE — TELEPHONE ENCOUNTER
----- Message from GREGORY Garcia sent at 9/19/2022 12:18 PM PDT -----  Please refer to sleep med. TY  ----- Message -----  From: Yousif Nelson R.N.  Sent: 9/16/2022   8:51 AM PDT  To: GREGORY Garcia    To , refer to sleep medicine?

## 2022-09-20 NOTE — TELEPHONE ENCOUNTER
Upon chart review cardioversion cancelled and monitor placement not scheduled at this time.    Task deferred to schedulers to schedule for monitor placement via staff message.

## 2022-09-22 ENCOUNTER — TELEPHONE (OUTPATIENT)
Dept: CARDIOLOGY | Facility: MEDICAL CENTER | Age: 57
End: 2022-09-22
Payer: COMMERCIAL

## 2022-09-22 ENCOUNTER — APPOINTMENT (OUTPATIENT)
Dept: CARDIOLOGY | Facility: MEDICAL CENTER | Age: 57
End: 2022-09-22
Attending: NURSE PRACTITIONER
Payer: COMMERCIAL

## 2022-09-22 NOTE — TELEPHONE ENCOUNTER
Monitor is being mailed to PT, PT specified date as it will arrive when she has returned from a trip. =)   SLC

## 2022-09-22 NOTE — TELEPHONE ENCOUNTER
----- Message from Kayla Covington R.N. sent at 9/20/2022 10:59 AM PDT -----  Please schedule pt for monitor placement, thank you!

## 2022-09-30 ENCOUNTER — NON-PROVIDER VISIT (OUTPATIENT)
Dept: CARDIOLOGY | Facility: MEDICAL CENTER | Age: 57
End: 2022-09-30
Attending: NURSE PRACTITIONER
Payer: COMMERCIAL

## 2022-09-30 DIAGNOSIS — Z79.899 HIGH RISK MEDICATION USE: ICD-10-CM

## 2022-09-30 DIAGNOSIS — I49.1 APC (ATRIAL PREMATURE CONTRACTIONS): ICD-10-CM

## 2022-09-30 DIAGNOSIS — J34.2 DEVIATED SEPTUM: ICD-10-CM

## 2022-09-30 DIAGNOSIS — I48.19 PERSISTENT ATRIAL FIBRILLATION (HCC): ICD-10-CM

## 2022-09-30 DIAGNOSIS — I49.3 PVC'S (PREMATURE VENTRICULAR CONTRACTIONS): ICD-10-CM

## 2022-09-30 NOTE — PROGRESS NOTES
Home enrollment completed in the 14 day [employer] XT Holter monitoring program, per DOMITILA Caal.  Monitor will be shipped to patient via [employer].  >Pending EOS.

## 2022-10-17 ENCOUNTER — PATIENT MESSAGE (OUTPATIENT)
Dept: CARDIOLOGY | Facility: MEDICAL CENTER | Age: 57
End: 2022-10-17
Payer: COMMERCIAL

## 2022-10-17 ENCOUNTER — APPOINTMENT (RX ONLY)
Dept: URBAN - METROPOLITAN AREA CLINIC 20 | Facility: CLINIC | Age: 57
Setting detail: DERMATOLOGY
End: 2022-10-17

## 2022-10-17 DIAGNOSIS — L82.0 INFLAMED SEBORRHEIC KERATOSIS: ICD-10-CM

## 2022-10-17 DIAGNOSIS — L73.8 OTHER SPECIFIED FOLLICULAR DISORDERS: ICD-10-CM

## 2022-10-17 PROCEDURE — ? COUNSELING

## 2022-10-17 PROCEDURE — 17110 DESTRUCTION B9 LES UP TO 14: CPT

## 2022-10-17 PROCEDURE — ? ELECTRODESICCATION (COSMETIC)

## 2022-10-17 PROCEDURE — ? LIQUID NITROGEN

## 2022-10-17 ASSESSMENT — LOCATION SIMPLE DESCRIPTION DERM
LOCATION SIMPLE: LEFT THIGH
LOCATION SIMPLE: RIGHT FOREHEAD
LOCATION SIMPLE: RIGHT CHEEK

## 2022-10-17 ASSESSMENT — LOCATION ZONE DERM
LOCATION ZONE: LEG
LOCATION ZONE: FACE

## 2022-10-17 ASSESSMENT — LOCATION DETAILED DESCRIPTION DERM
LOCATION DETAILED: RIGHT FOREHEAD
LOCATION DETAILED: LEFT ANTERIOR PROXIMAL THIGH
LOCATION DETAILED: RIGHT SUPERIOR NASAL CHEEK

## 2022-10-17 NOTE — PROCEDURE: LIQUID NITROGEN
Duration Of Freeze Thaw-Cycle (Seconds): 5-10
Number Of Freeze-Thaw Cycles: 2 freeze-thaw cycles
Post-Care Instructions: I reviewed with the patient in detail post-care instructions. Patient is to wear sunprotection, and avoid picking at any of the treated lesions. Pt may apply Vaseline to crusted or scabbing areas.
Render Post-Care Instructions In Note?: no
Show Applicator Variable?: Yes
Medical Necessity Information: It is in your best interest to select a reason for this procedure from the list below. All of these items fulfill various CMS LCD requirements except the new and changing color options.
Detail Level: Detailed
Consent: The patient's consent was obtained including but not limited to risks of crusting, scabbing, blistering, scarring, darker or lighter pigmentary change, recurrence, incomplete removal and infection.
Medical Necessity Clause: This procedure was medically necessary because the lesions that were treated were:
Spray Paint Text: The liquid nitrogen was applied to the skin utilizing a spray paint frosting technique.

## 2022-10-17 NOTE — PROCEDURE: ELECTRODESICCATION (COSMETIC)
Post-Care Instructions: I reviewed with the patient in detail post-care instructions. Patient is to wear sunprotection, and avoid picking at any of the treated lesions. Pt may apply Vaseline to crusted or scabbing areas
Consent: The patient's consent was obtained including but not limited to risks of crusting, scabbing, blistering, scarring, darker or lighter pigmentary change, recurrence, incomplete removal and infection.
Hdez: 1.1
Detail Level: Detailed

## 2022-10-19 NOTE — PATIENT COMMUNICATION
Replied to pt via Tunespeak requesting more information regarding concerns, awaiting pt response.

## 2022-10-25 ENCOUNTER — OFFICE VISIT (OUTPATIENT)
Dept: CARDIOLOGY | Facility: MEDICAL CENTER | Age: 57
End: 2022-10-25
Payer: COMMERCIAL

## 2022-10-25 VITALS
RESPIRATION RATE: 14 BRPM | SYSTOLIC BLOOD PRESSURE: 122 MMHG | HEART RATE: 60 BPM | HEIGHT: 65 IN | BODY MASS INDEX: 35.16 KG/M2 | DIASTOLIC BLOOD PRESSURE: 84 MMHG | OXYGEN SATURATION: 96 % | WEIGHT: 211 LBS

## 2022-10-25 DIAGNOSIS — I48.19 PERSISTENT ATRIAL FIBRILLATION (HCC): ICD-10-CM

## 2022-10-25 PROCEDURE — 93000 ELECTROCARDIOGRAM COMPLETE: CPT | Performed by: INTERNAL MEDICINE

## 2022-10-25 PROCEDURE — 99204 OFFICE O/P NEW MOD 45 MIN: CPT | Mod: 25 | Performed by: INTERNAL MEDICINE

## 2022-10-25 NOTE — PROGRESS NOTES
Arrhythmia Clinic Note (New patient)     DOS: 10/25/2022    Referring physician: SUJATHA Pradhan    Chief complaint/Reason for consult: Afib    HPI: 58 y/o F with new onset afib after episode of binge drinking. Lasted about a week then spontaneously resolved. Was started on OAC and referred to me. Pt also had abnormal OPO and referred to sleep medicine. Drinking less now.     ROS (+ highlighted in bold):  Constitutional: Fevers/chills/fatigue/weightloss  HEENT: Blurry vision/eye pain/sore throat/hearing loss  Respiratory: Shortness of breath/cough  Cardiovascular: Chest pain/palpitations/edema/orthopnea/syncope  GI: Nausea/vomitting/diarrhea  MSK: Arthralgias/myagias/muscle weakness  Skin: Rash/sores  Neurological: Numbness/tremors/vertigo  Endocrine: Excessive thirst/polyuria/cold intolerance/heat intolerance  Psych: Depression/anxiety    Past Medical History:   Diagnosis Date    Arrhythmia     rapid heart rate     Asthma     has not used inhaler in a year    Chest discomfort 11/18/2013    Depression     High cholesterol     History of anemia     Hypertension     Obesity     Palpitations 11/18/2013    Psychiatric disorder     anxiety    Snoring     Ventricular bigeminy 11/18/2013       Past Surgical History:   Procedure Laterality Date    KNEE ARTHROSCOPY Right 6/3/2019    Procedure: ARTHROSCOPY, KNEE;  Surgeon: Jag Gomez M.D.;  Location: [facility];  Service: Orthopedics    MENISCECTOMY, KNEE, MEDIAL Right 6/3/2019    Procedure: MENISCECTOMY, KNEE, MEDIAL, LATERAL-  PARTIAL;  Surgeon: Jag Gomez M.D.;  Location: SURGERY [facility];  Service: Orthopedics    CHONDROPLASTY Right 6/3/2019    Procedure: CHONDROPLASTY- patella;  Surgeon: Jag Gomez M.D.;  Location: [facility];  Service: Orthopedics    COLONOSCOPY         Social History     Socioeconomic History    Marital status:      Spouse name: Not on file    Number of children: Not on file    Years of education: Not  on file    Highest education level: Not on file   Occupational History    Not on file   Tobacco Use    Smoking status: Never    Smokeless tobacco: Never   Vaping Use    Vaping Use: Never used   Substance and Sexual Activity    Alcohol use: Yes     Alcohol/week: 0.0 - 8.4 oz     Comment: 3-4x a week    Drug use: No    Sexual activity: Not Currently     Partners: Male   Other Topics Concern    Not on file   Social History Narrative    Not on file     Social Determinants of Health     Financial Resource Strain: Not on file   Food Insecurity: Not on file   Transportation Needs: Not on file   Physical Activity: Not on file   Stress: Not on file   Social Connections: Not on file   Intimate Partner Violence: Not on file   Housing Stability: Not on file       Family History   Problem Relation Age of Onset    Diabetes Mother     Genitourinary () Problems Mother         renal failure, kidey transplant    Hypertension Mother     Heart Disease Mother     Thyroid Sister         2 with removal due to Graves    Hypertension Brother     Cancer Brother 60        colon cancer     Cancer Paternal Grandmother         colon    Cancer Paternal Grandfather         lung    Cancer Maternal Aunt     Hypertension Father     Cancer Maternal Grandmother         breast       No Known Allergies    Current Outpatient Medications   Medication Sig Dispense Refill    rivaroxaban (XARELTO) 20 MG Tab tablet Take 1 Tablet by mouth with dinner. 30 Tablet 2    metoprolol SR (TOPROL XL) 25 MG TABLET SR 24 HR Take 1 Tablet by mouth every day. 90 Tablet 3    amLODIPine (NORVASC) 5 MG Tab Take 0.5 Tablets by mouth every day. 90 Tablet 0    acyclovir (ZOVIRAX) 800 MG Tab Take 1 Tablet by mouth 3 times a day. TAKE 1 TABLET ORALLY 3 TIMES A DAY 30 Tablet 3    albuterol 108 (90 Base) MCG/ACT Aero Soln inhalation aerosol INHALE 2 PUFFS BY MOUTH EVERY 6 HOURS AS NEEDED FOR SHORTNESS OF BREATH 8.5 Inhaler 1    fluticasone (FLONASE) 50 MCG/ACT nasal spray Spray 2  "Sprays in nose every day. 1 Bottle 11    multivitamin (THERAGRAN) Tab Take 1 Tab by mouth every day.      COLLAGEN PO Take  by mouth every day. 1/2 scoop      Cholecalciferol (VITAMIN D) 1000 UNIT CAPS Take 1 Cap by mouth every day.       No current facility-administered medications for this visit.       Physical Exam:  Vitals:    10/25/22 1041   BP: 122/84   BP Location: Left arm   Patient Position: Sitting   BP Cuff Size: Adult   Pulse: 60   Resp: 14   SpO2: 96%   Weight: 95.7 kg (211 lb)   Height: 1.651 m (5' 5\")     General appearance: NAD, conversant   Eyes: anicteric sclerae, moist conjunctivae; no lid-lag; PERRLA  HENT: Atraumatic; oropharynx clear with moist mucous membranes and no mucosal ulcerations; normal hard and soft palate  Neck: Trachea midline; FROM, supple, no thyromegaly or lymphadenopathy  Lungs: CTA, with normal respiratory effort and no intercostal retractions  CV: RRR, no MRGs, no JVD   Abdomen: Soft, non-tender; no masses or HSM  Extremities: No peripheral edema or extremity lymphadenopathy  Skin: Normal temperature, turgor and texture; no rash, ulcers or subcutaneous nodules  Psych: Appropriate affect, alert and oriented to person, place and time    Data:  Lipids:   Lab Results   Component Value Date/Time    CHOLSTRLTOT 172 08/18/2022 10:33 AM    TRIGLYCERIDE 127 08/18/2022 10:33 AM    HDL 54 08/18/2022 10:33 AM    LDL 93 08/18/2022 10:33 AM        BMP:  Lab Results   Component Value Date/Time    SODIUM 144 08/18/2022 1033    POTASSIUM 4.4 08/18/2022 1033    CHLORIDE 107 08/18/2022 1033    CO2 25 08/18/2022 1033    GLUCOSE 126 (H) 08/18/2022 1033    BUN 10 08/18/2022 1033    CREATININE 0.77 08/18/2022 1033    CALCIUM 10.0 08/18/2022 1033    ANION 12.0 08/18/2022 1033        TSH:   Lab Results   Component Value Date/Time    TSHULTRASEN 1.010 08/18/2022 1033        THYROXINE (T4):   No results found for: KATE     CBC:   Lab Results   Component Value Date/Time    WBC 6.2 05/29/2019 04:10 PM "    RBC 4.60 05/29/2019 04:10 PM    HEMOGLOBIN 13.5 05/29/2019 04:10 PM    HEMATOCRIT 42.1 05/29/2019 04:10 PM    MCV 91.5 05/29/2019 04:10 PM    MCH 29.3 05/29/2019 04:10 PM    MCHC 32.1 (L) 05/29/2019 04:10 PM    RDW 39.5 05/29/2019 04:10 PM    PLATELETCT 300 05/29/2019 04:10 PM    MPV 9.5 05/29/2019 04:10 PM    NEUTSPOLYS 59.00 03/27/2018 08:28 AM    LYMPHOCYTES 30.90 03/27/2018 08:28 AM    MONOCYTES 7.20 03/27/2018 08:28 AM    EOSINOPHILS 1.80 03/27/2018 08:28 AM    BASOPHILS 0.90 03/27/2018 08:28 AM    IMMGRAN 0.20 03/27/2018 08:28 AM    NRBC 0.00 03/27/2018 08:28 AM    NEUTS 2.64 03/27/2018 08:28 AM    LYMPHS 1.38 03/27/2018 08:28 AM    MONOS 0.32 03/27/2018 08:28 AM    EOS 0.08 03/27/2018 08:28 AM    BASO 0.04 03/27/2018 08:28 AM    IMMGRANAB 0.01 03/27/2018 08:28 AM    NRBCAB 0.00 03/27/2018 08:28 AM        CBC w/o DIFF  Lab Results   Component Value Date/Time    WBC 6.2 05/29/2019 04:10 PM    RBC 4.60 05/29/2019 04:10 PM    HEMOGLOBIN 13.5 05/29/2019 04:10 PM    MCV 91.5 05/29/2019 04:10 PM    MCH 29.3 05/29/2019 04:10 PM    MCHC 32.1 (L) 05/29/2019 04:10 PM    RDW 39.5 05/29/2019 04:10 PM    MPV 9.5 05/29/2019 04:10 PM       Prior echo/stress results reviewed: Normal EF    EKG interpreted by me: NSR    Impression/Plan:  1. Persistent atrial fibrillation (HCC)  EKG        pAF    - We discussed her diagnosis, given low [id number] reasonable to stop OAC at this time  - Discussed stopping binge drinking, also follow up with sleep medicine  - If recurrence of afib despite above interventions consider ablation    A total of 50 minutes of time was spent on day of encounter reviewing medical record, performing history and examination, counseling, ordering medication/test/consults, collaborating with referring service, and documentation.    Follow up with JACKELYN in 3 months, with me 1 year      Juni Galvan MD  Cardiac Electrophysiology

## 2022-10-31 ENCOUNTER — TELEPHONE (OUTPATIENT)
Dept: CARDIOLOGY | Facility: MEDICAL CENTER | Age: 57
End: 2022-10-31
Payer: COMMERCIAL

## 2022-11-01 PROCEDURE — 93248 EXT ECG>7D<15D REV&INTERPJ: CPT | Performed by: INTERNAL MEDICINE

## 2022-11-12 LAB — EKG IMPRESSION: NORMAL

## 2022-11-24 DIAGNOSIS — I10 ESSENTIAL HYPERTENSION: ICD-10-CM

## 2022-11-28 RX ORDER — AMLODIPINE BESYLATE 5 MG/1
TABLET ORAL
Qty: 90 TABLET | Refills: 0 | Status: SHIPPED | OUTPATIENT
Start: 2022-11-28 | End: 2023-04-24

## 2022-12-20 ENCOUNTER — OFFICE VISIT (OUTPATIENT)
Dept: MEDICAL GROUP | Facility: LAB | Age: 57
End: 2022-12-20
Payer: COMMERCIAL

## 2022-12-20 VITALS
RESPIRATION RATE: 16 BRPM | SYSTOLIC BLOOD PRESSURE: 124 MMHG | HEART RATE: 68 BPM | BODY MASS INDEX: 34.49 KG/M2 | OXYGEN SATURATION: 99 % | TEMPERATURE: 97.7 F | WEIGHT: 207 LBS | DIASTOLIC BLOOD PRESSURE: 80 MMHG | HEIGHT: 65 IN

## 2022-12-20 DIAGNOSIS — U09.9 POST COVID-19 CONDITION, UNSPECIFIED: ICD-10-CM

## 2022-12-20 DIAGNOSIS — R06.02 SHORTNESS OF BREATH: ICD-10-CM

## 2022-12-20 PROCEDURE — 99214 OFFICE O/P EST MOD 30 MIN: CPT | Performed by: FAMILY MEDICINE

## 2022-12-20 RX ORDER — FLUTICASONE PROPIONATE AND SALMETEROL 250; 50 UG/1; UG/1
1 POWDER RESPIRATORY (INHALATION) EVERY 12 HOURS
Qty: 60 EACH | Refills: 2 | Status: SHIPPED | OUTPATIENT
Start: 2022-12-20 | End: 2024-01-09

## 2022-12-21 NOTE — PROGRESS NOTES
Chief Complaint:   Chief Complaint   Patient presents with    Other     S/P Covid- still having lasting side effects        HPI: Established patient, new to me  Vicky Nino is a 57 y.o. female who presents for evaluation of shortness of breath, patient past medical history is significant for history of palpitations and arrhythmia that required short-term use of blood thinners that was stopped by her cardiologist recently.  History of hypertension    discussed the following today:    1. Shortness of breath  This is a new concern  Started after recent infection with COVID around 6 weeks ago.  Patient feels that she did not recover completely and she did not go back to her baseline of health.  She feels shortness of breath and breathing discomfort along with her chronic cough.  Reports no chest pain or legs or calf pain.  Patient is not taking blood thinners at this time it was stopped by her cardiologist she used to be on Xarelto for treatment of atrial fibrillation.    Vital signs and oxygen saturation at the office today within normal limits.  Saturating 99% room air    2. Post covid-19 condition, unspecified  Had COVID around 6 weeks ago, not recovering completely as described above, denies fever or other symptoms other than chronic cough and persistence of shortness of breath          Past medical history, family history, social history and medications reviewed and updated in the record.  Today  Current medications, problem list and allergies reviewed in EPIC today  Health maintenance topics are reviewed and updated.    Patient Active Problem List    Diagnosis Date Noted    Deviated septum 08/26/2022    Metabolic syndrome 11/19/2019    Chronic fatigue 11/19/2019    History of meniscal tear 05/21/2019    Morbid obesity with BMI of 40.0-44.9, adult (HCC) 05/04/2018    Dense breast tissue on mammogram 05/04/2018    Depression 03/26/2018    Abnormal CBC 10/20/2016    HSV-1 infection 10/20/2016    Elevated  fasting glucose 10/06/2016    Hyperlipidemia 10/06/2016    HSV infection 10/06/2016    History of cold sores 10/06/2016    Family history of diabetes mellitus in mother 10/06/2016    Family history of colon cancer 10/06/2016    Asthma in adult 04/24/2015    Environmental allergies 04/24/2015    Essential hypertension 07/01/2014     Family History   Problem Relation Age of Onset    Diabetes Mother     Genitourinary () Problems Mother         renal failure, kidey transplant    Hypertension Mother     Heart Disease Mother     Thyroid Sister         2 with removal due to Graves    Hypertension Brother     Cancer Brother 60        colon cancer     Cancer Paternal Grandmother         colon    Cancer Paternal Grandfather         lung    Cancer Maternal Aunt     Hypertension Father     Cancer Maternal Grandmother         breast     Social History     Socioeconomic History    Marital status:      Spouse name: Not on file    Number of children: Not on file    Years of education: Not on file    Highest education level: Not on file   Occupational History    Not on file   Tobacco Use    Smoking status: Never    Smokeless tobacco: Never   Vaping Use    Vaping Use: Never used   Substance and Sexual Activity    Alcohol use: Yes     Alcohol/week: 0.0 - 8.4 oz     Comment: 3-4x a week    Drug use: No    Sexual activity: Not Currently     Partners: Male   Other Topics Concern    Not on file   Social History Narrative    Not on file     Social Determinants of Health     Financial Resource Strain: Not on file   Food Insecurity: Not on file   Transportation Needs: Not on file   Physical Activity: Not on file   Stress: Not on file   Social Connections: Not on file   Intimate Partner Violence: Not on file   Housing Stability: Not on file     Current Outpatient Medications   Medication Sig Dispense Refill    fluticasone-salmeterol (ADVAIR) 250-50 MCG/ACT AEROSOL POWDER, BREATH ACTIVATED Inhale 1 Puff every 12 hours. 60 Each 2     "amLODIPine (NORVASC) 5 MG Tab TAKE 1 TABLET BY MOUTH EVERY DAY 90 Tablet 0    metoprolol SR (TOPROL XL) 25 MG TABLET SR 24 HR Take 1 Tablet by mouth every day. 90 Tablet 3    acyclovir (ZOVIRAX) 800 MG Tab Take 1 Tablet by mouth 3 times a day. TAKE 1 TABLET ORALLY 3 TIMES A DAY 30 Tablet 3    albuterol 108 (90 Base) MCG/ACT Aero Soln inhalation aerosol INHALE 2 PUFFS BY MOUTH EVERY 6 HOURS AS NEEDED FOR SHORTNESS OF BREATH 8.5 Inhaler 1    fluticasone (FLONASE) 50 MCG/ACT nasal spray Spray 2 Sprays in nose every day. 1 Bottle 11    multivitamin (THERAGRAN) Tab Take 1 Tab by mouth every day.      COLLAGEN PO Take  by mouth every day. 1/2 scoop      Cholecalciferol (VITAMIN D) 1000 UNIT CAPS Take 1 Cap by mouth every day.       No current facility-administered medications for this visit.           Review Of Systems  As documented in HPI above  PHYSICAL EXAMINATION:    /80 (BP Location: Right arm, Patient Position: Sitting, BP Cuff Size: Adult)   Pulse 68   Temp 36.5 °C (97.7 °F) (Temporal)   Resp 16   Ht 1.651 m (5' 5\")   Wt 93.9 kg (207 lb)   LMP 09/18/2013   SpO2 99%   BMI 34.45 kg/m²   Gen.: Well-developed, well-nourished, no apparent distress, pleasant and cooperative with the examination  HEENT: Normocephalic/atraumatic, sinuses nontender with palpation, TMs clear, nares patent with pink mucosa and clear rhinorrhea, oropharynx clear  Neck: No JVD or bruits, no adenopathy  Cor: Regular rate and rhythm without murmur gallop or rub  Lungs: Clear to auscultation with equal breath sounds bilaterally. No wheezes, rhonchi.  Abdomen: Soft nontender without hepatosplenomegaly or masses appreciated, normoactive bowel sounds  Extremities: No cyanosis, clubbing or edema       ASSESSMENT/Plan:  1. Shortness of breath  New concern, unresolved shortness of breath after recent COVID infection.  Possibly post-COVID effect, yet I will rule out other causes like blood clot.  Patient is clinically stable with very " benign clinical exam and saturating at 99% on room air.  Advised to do a chest x-ray and a CT, I will add steroid inhaler along with albuterol use as directed, follow-up as directed.  If any worsening of symptoms to report to ER  DX-CHEST-2 VIEWS    CT-CHEST (THORAX) WITH    fluticasone-salmeterol (ADVAIR) 250-50 MCG/ACT AEROSOL POWDER, BREATH ACTIVATED    CT-CHEST (THORAX) WITH      2. Post covid-19 condition, unspecified  DX-CHEST-2 VIEWS    CT-CHEST (THORAX) WITH    CT-CHEST (THORAX) WITH         Please note that this dictation was created using voice recognition software. I have made every reasonable attempt to correct obvious errors but there may be errors of grammar and content that I may have overlooked prior to finalization of this note.

## 2023-01-04 ENCOUNTER — APPOINTMENT (OUTPATIENT)
Dept: RADIOLOGY | Facility: MEDICAL CENTER | Age: 58
End: 2023-01-04
Attending: FAMILY MEDICINE
Payer: COMMERCIAL

## 2023-01-16 ENCOUNTER — TELEPHONE (OUTPATIENT)
Dept: SLEEP MEDICINE | Facility: MEDICAL CENTER | Age: 58
End: 2023-01-16
Payer: COMMERCIAL

## 2023-01-16 NOTE — TELEPHONE ENCOUNTER
SLEEP - NEW PATIENT CHART PREP COMPLETED ON 01/16/2023    SCHEDULED FOLLOW UP 01/19/2023    Ref by  GRCAE HERNANDES Dx: Nocturnal oxygen desaturation , Deviated septum , Suspected sleep apnea , Persistent atrial fibrillation     OPO 09/13/2022-09/14/2022 , NO REF NOTES    Referring OV Notes Reviewed : NO    Is Pt currently on PAP? NO .If yes, contact patient as a remind to bring device with them.     Nocturnal Oxygen :NO . If yes, Liter Flow? N/A    Any prior Sleep Studies on file? NO. If yes, when? N/A    Previously seen with [facility]? NO If yes, when? N/A    Previous PMA Patient? NO

## 2023-01-19 ENCOUNTER — OFFICE VISIT (OUTPATIENT)
Dept: SLEEP MEDICINE | Facility: MEDICAL CENTER | Age: 58
End: 2023-01-19
Payer: COMMERCIAL

## 2023-01-19 VITALS
SYSTOLIC BLOOD PRESSURE: 110 MMHG | DIASTOLIC BLOOD PRESSURE: 70 MMHG | OXYGEN SATURATION: 95 % | RESPIRATION RATE: 16 BRPM | HEIGHT: 65 IN | HEART RATE: 66 BPM | WEIGHT: 205 LBS | BODY MASS INDEX: 34.16 KG/M2

## 2023-01-19 DIAGNOSIS — G47.30 BREATHING-RELATED SLEEP DISORDER: ICD-10-CM

## 2023-01-19 DIAGNOSIS — R06.83 SNORING: ICD-10-CM

## 2023-01-19 PROCEDURE — 99204 OFFICE O/P NEW MOD 45 MIN: CPT | Performed by: PREVENTIVE MEDICINE

## 2023-01-19 RX ORDER — OMEPRAZOLE 20 MG/1
20 CAPSULE, DELAYED RELEASE ORAL DAILY
COMMUNITY

## 2023-01-19 NOTE — PROGRESS NOTES
"CHIEF COMPLIANT: \"I guess I need a sleep study.\"  HISTORY OF PRESENT ILLNESS:  Vicky Nino is a 57 y.o. female kindly referred by Yolanda Murphy M.D. and  is here for a sleep medicine consultation.     Sleep History: She was referred by her cardiologist and developed A fit in 08/2022. She had an echo that showed normal left ventricular function and her afib resolved without treatment. She reports that she sleeps but has difficulty staying asleep. She does not feel refreshed in the am. She snores and periodically wake herself up snorting. Her bedtime is 10pm and wake up 530am. She has lost 45lbs since 04/2022. She is interested in getting a new study. She is also considering getting her deviated septum repaired by Dr. Farias. She has never used tobacco, she will use alcohol once a month, and cannabis once a year. She drinks coffee daily 1 cup.       EPWORTH SCORE:    7-9  /24, this is NOT consistent with EDS    TYPE: OPO   DATE: 09/123/22  7hrs 14mins, Done on RA   Oxygen saturation:18% of time under 90%, but 0% of time under 80%. Oxygen isael 79%      Significant comorbidities and modifying factors: see below    PAST MEDICAL HISTORY:  Past Medical History:   Diagnosis Date    Arrhythmia     rapid heart rate     Asthma     has not used inhaler in a year    Atrial fibrillation (HCC)     Chest discomfort 11/18/2013    Chickenpox     Cough     Depression     High cholesterol     History of anemia     Hypertension     Obesity     Palpitations 11/18/2013    Psychiatric disorder     anxiety    Rhinitis     Snoring     Ventricular bigeminy 11/18/2013    Wears glasses     Weight loss       PROBLEM LIST:  Patient Active Problem List    Diagnosis Date Noted    Deviated septum 08/26/2022    Metabolic syndrome 11/19/2019    Chronic fatigue 11/19/2019    History of meniscal tear 05/21/2019    Morbid obesity with BMI of 40.0-44.9, adult (HCC) 05/04/2018    Dense breast tissue on mammogram 05/04/2018    Depression " 03/26/2018    Abnormal CBC 10/20/2016    HSV-1 infection 10/20/2016    Elevated fasting glucose 10/06/2016    Hyperlipidemia 10/06/2016    HSV infection 10/06/2016    History of cold sores 10/06/2016    Family history of diabetes mellitus in mother 10/06/2016    Family history of colon cancer 10/06/2016    Asthma in adult 04/24/2015    Environmental allergies 04/24/2015    Essential hypertension 07/01/2014     PAST SOCIAL HISTORY:  Past Surgical History:   Procedure Laterality Date    KNEE ARTHROSCOPY Right 06/03/2019    Procedure: ARTHROSCOPY, KNEE;  Surgeon: Jag Gomez M.D.;  Location: [facility];  Service: Orthopedics    MENISCECTOMY, KNEE, MEDIAL Right 06/03/2019    Procedure: MENISCECTOMY, KNEE, MEDIAL, LATERAL-  PARTIAL;  Surgeon: Jag Gomez M.D.;  Location: [facility];  Service: Orthopedics    CHONDROPLASTY Right 06/03/2019    Procedure: CHONDROPLASTY- patella;  Surgeon: Jag Gomez M.D.;  Location: [facility];  Service: Orthopedics    ARTHROSCOPY, KNEE      COLONOSCOPY       PAST FAMILY HISTORY:  Family History   Problem Relation Age of Onset    Diabetes Mother     Genitourinary () Problems Mother         renal failure, kidey transplant    Hypertension Mother     Heart Disease Mother     Kidney Disease Mother     Thyroid Sister         2 with removal due to Graves    Hypertension Brother     Cancer Brother 60        colon cancer     Colon Cancer Brother     Cancer Paternal Grandmother         Breast cancer    Cancer Paternal Grandfather         lung    Cancer Maternal Aunt     Hypertension Father     Cancer Maternal Grandmother         Skin Cancer    Cancer Maternal Aunt         Breast cancer    Sleep Apnea Sister      SOCIAL HISTORY:  Social History     Socioeconomic History    Marital status:      Spouse name: Not on file    Number of children: Not on file    Years of education: Not on file    Highest education level: Not on file  "  Occupational History    Not on file   Tobacco Use    Smoking status: Never    Smokeless tobacco: Never   Vaping Use    Vaping Use: Never used   Substance and Sexual Activity    Alcohol use: Not Currently     Comment: rarely drink    Drug use: Never    Sexual activity: Not Currently     Partners: Male   Other Topics Concern    Not on file   Social History Narrative    Not on file     Social Determinants of Health     Financial Resource Strain: Not on file   Food Insecurity: Not on file   Transportation Needs: Not on file   Physical Activity: Not on file   Stress: Not on file   Social Connections: Not on file   Intimate Partner Violence: Not on file   Housing Stability: Not on file     ALLERGIES: Patient has no known allergies.  MEDICATIONS:  Current Outpatient Medications   Medication Sig Dispense Refill    omeprazole (PRILOSEC) 20 MG delayed-release capsule Take 20 mg by mouth every day.      fluticasone-salmeterol (ADVAIR) 250-50 MCG/ACT AEROSOL POWDER, BREATH ACTIVATED Inhale 1 Puff every 12 hours. 60 Each 2    amLODIPine (NORVASC) 5 MG Tab TAKE 1 TABLET BY MOUTH EVERY DAY 90 Tablet 0    metoprolol SR (TOPROL XL) 25 MG TABLET SR 24 HR Take 1 Tablet by mouth every day. 90 Tablet 3    acyclovir (ZOVIRAX) 800 MG Tab Take 1 Tablet by mouth 3 times a day. TAKE 1 TABLET ORALLY 3 TIMES A DAY 30 Tablet 3    albuterol 108 (90 Base) MCG/ACT Aero Soln inhalation aerosol INHALE 2 PUFFS BY MOUTH EVERY 6 HOURS AS NEEDED FOR SHORTNESS OF BREATH 8.5 Inhaler 1    fluticasone (FLONASE) 50 MCG/ACT nasal spray Spray 2 Sprays in nose every day. 1 Bottle 11    multivitamin (THERAGRAN) Tab Take 1 Tab by mouth every day.      COLLAGEN PO Take  by mouth every day. 1/2 scoop      Cholecalciferol (VITAMIN D) 1000 UNIT CAPS Take 1 Cap by mouth every day.       No current facility-administered medications for this visit.    \"CURRENT RX\"    REVIEW OF SYSTEMS:  Constitutional: Denies weight loss, endorses chronic daytime fatigue  Eyes: Denies " "vision changes  Ears/Nose/Mouth/Throat: Denies rhinitis/nasal congestion, injury, decayed teeth/toothaches.  Cardiovascular: Denies chest pain, tightness, palpitations, swelling in legs/feet, difficulty breathing when lying down but gets better when sitting up.   Respiratory: Denies shortness of breath while awake,  Sleep: per HPI  Gastrointestinal: Denies  difficulty swallowing,  heartburn.  Genitourinary: REPORTS nocturia  Musculoskeletal: Denies painful joints, sore muscles, back pain.   Neurological: Denies frequent headaches,weakness, dizziness.    PHYSICAL EXAM/VITALS:  /70 (BP Location: Left arm, Patient Position: Sitting, BP Cuff Size: Large adult)   Pulse 66   Resp 16   Ht 1.651 m (5' 5\")   Wt 93 kg (205 lb)   LMP 09/18/2013   SpO2 95%   BMI 34.11 kg/m²   Neck circumference (inches): 15.5  Appearance: Well-nourished, well-developed,  looks stated age, no acute distress  Eyes:   EOMI  ENMT: MASKED   Neck: Supple, trachea midline  Respiratory effort:  No intercostal retractions or use of accessory muscles  Lung auscultation:  No wheezes rhonchi rubs or rales  Cardiac: No murmurs, rubs, or gallops; regular rhythm, normal rate; no edema  Musculoskeletal:  Grossly normal; gait and station normal  Neurologic:  oriented to person, time, place, and purpose; judgement intact  Psychiatric:  No depression, anxiety, agitation    MEDICAL DECISION MAKING:  The medical record was reviewed as it pertains to this referral. This includes records from primary care,consultants notes, referral request, hospital records, labs and imaging. Any available diagnostic and titration nocturnal polysomnograms, home sleep apnea tests, continuous nocturnal oximetry results, multiple sleep latency tests, and recent compliance reports were reviewed with the patient.    ASSESSMENT/PLAN:  Vicky Nino is a 57 y.o. female who has a high pretest probability of having obstructive sleep apnea.     1. Breathing-related sleep " disorder  - Overnight Home Sleep Study; Future    2. Snoring  - Overnight Home Sleep Study; Future    Other orders  - omeprazole (PRILOSEC) 20 MG delayed-release capsule; Take 20 mg by mouth every day.    The patient has signs and symptoms consistent with obstructive sleep apnea hypopnea syndrome. Will schedule a nocturnal polysomnogram or a home sleep apnea test (please see plan).  The risks of untreated sleep apnea were discussed with the patient at length. Patients with PHIL are at increased risk of cardiovascular disease including coronary artery disease, systemic arterial hypertension, pulmonary arterial hypertension, cardiac arrhythmias, and stroke. PHIL patients have an increased risk of motor vehicle accidents, type 2 diabetes, chronic kidney disease, and non-alcoholic liver disease. The patient was advised to avoid driving a motor vehicle when drowsy.  Have advised the patient to follow up with the appropriate healthcare practitioners for all other medical problems and issues.    RETURN TO CLINIC: Return for 3 weeks after ss to discuss results -DR. Leblanc.    My total time spent caring for the patient on the day of the encounter was 40 minutes. This includes time spent on a thorough chart review including other physician notes, all sleep studies, as well as critical labs and pulmonary and cardiac studies.  Additionally, it includes a thorough discussion of good sleep hygiene and stimulus control, as well as  the need for consistency in terms of sleep preparation and practice.    Please note that this dictation was created using voice recognition software.  I have made every reasonable attempt to correct obvious errors, I expect that there are errors of grammar and possibly content that I did not discover before finalizing this note.                      Answers submitted by the patient for this visit:  Sleep Center Questionnaire (Submitted on 1/18/2023)  Year of your last physical exam: 2022  Results of exam:  "A fib - resolved on its own -  everything else was normal  Occupation : teacher  Height: 5'5\"  Current weight: 204  6 months ago: 220  At age 21: 125  What is the reason for your visit today?: checking for sleep issues  Name of person referring you to the Sleep Center: Cardiology Jayson Salcido  Have you ever been hospitalized?: Yes  Reason, year, and hospital in which you were hospitalized:: Heart rate elevated - 2014? [facility]  Have you ever had problems with anesthesia?: No  Have you experienced post-operative delirium?: No  Any complications with surgery?: No  What year did you receive your last Flu shot?: 2022  What year did you receive you last Pneumonia shot?: 2021  Have you had a TB skin test? If so, please list the year and result:: not sure of last test  Have you had Allergy skin testing? If so, please list the year and result:: 2018 - outside allergens and dust mites  Please briefly describe your sleep problem and how old you were when it began.: no issue - but I had Afib in August which could be from sleep issues  How does this affect your daily life and activities? Please also rate how serious of a problem this is (1 = Not at all, 10 = Very Serious).: 2 not sure  Have you had any previous evaluations, examinations, or treatment for this sleep problem or any other problems with your sleep? If so, please describe the evaluation, treatment, and results.: no - just a wrist monitor that I slept in  Have you used any medications (prescribed or otherwise) to help your sleep problem? If yes, include name, amount, frequency, and the prescribing physician.: no  If employed, what time do you usually start and end work?: 8-4  Do you ever change work shifts? If yes, describe how often (never, infrequently, regularly).: no  What time do you usually go to bed and wake up on: Weekdays? Weekends?: to bed by 10 wake up around 5:30  Do you have a regular bed partner?: No  How many minutes does it usually take to fall asleep at " night after turning off the lights?: depends... maybe 5 or 10 unless stressed  What do you ordinarily do just prior to turning out the lights and attempting to go to sleep (e.g., reading, TV, baths, etc.)?: reading a book or reading on my phone  On average, how many times do you wake up during the night?: 2-3  On average, how many times do you wake up to use the bathroom?: 1  Do you often wake up too early in the morning and are unable to return to sleep?: sometimes  On average, how many hours of sleep do you get per night?: 7  How do you usually awaken?  Alarm, spontaneously, or other?: both on my own or alarm  Is it difficult for you to awaken and get out of bed after sleeping? (Not at all, Sometimes, Very): sometimes  Do you nap or return to bed after arising?: sometimes  Are you bothered by sleepiness during the day?: sometimes  Do you feel that you get too much sleep at night?: no  Do you feel that you get too little sleep at night?: sometimes  Do you usually feel tired during the day? If so, what do you attribute this to?: sometimes due to workload  Do you find yourself falling asleep when you don't mean to? : no  If yes, how long does your sleep episode last?: n/a  Have you ever suddenly fallen?: no  Have you ever experienced sudden body weakness?: no  Have you ever experienced weakness or paralysis upon going to sleep?: no  Have you ever experienced weakness or paralysis upon awakening from sleep?: no - numbness in my right thigh sometimes  If yes for either of the above two questions, please indicate how many times per week does this occur?: 1-3  Have you ever experienced seeing things or hearing voices/noises: That weren't real? On going to sleep? During the night? On awakening from sleep? During the day?: never  Do you have difficulty breathing at night? If yes, briefly describe.: sometimes I feel congested or coughing  How many times per week?: 1-4  How did you become aware of this, at what age did this  "first occur, and how many years has this occurred?: 8 years worsening over the years but lately is less  Have you been told you snore while asleep? If so, does it disturb a bed partner (or someone in the same room), or someone in the next room?: yes -  said I snored on occasion but he passed away in 2018  Have you ever experienced doing something without being aware of the action? If yes, please describe.: no  Have you ever experienced upon lying in bed before sleep or on awakening from sleep: Restlessness of legs, \"nervous legs\", \"creeping crawling\" sensation of legs, or twitching of legs?: sensation of legs  How many times per week does this occur, and how many minutes does the sensation last?: not every week - usually gone when I fall asleep  Does anything relieve the sensations (e.g., getting out of bed, medication, massage)?: Yes  At what age did this first occur, and how many years has this occurred?: 55 - recent - might be when I'm on my feet more often  Have you ever been told that your arms or legs jerk or twitch while you are asleep? If yes, how many times per night does this occur?: no  Do you know, or have you ever been told that you do any of the following while sleeping: talk, walk, grit teeth, wet the bed, wake up screaming or seemingly afraid, have disturbing dreams, have unusual movements, wake up with headaches, (males) have erections? If yes to any of these, please indicate how many times per week, age started, last occurrence, treatment received.: I might grind teeth - not treated at this time  Has anyone in your family been known to have any sleep problems? If yes, please list the type of problem (e.g., trouble getting to sleep, too sleepy, bed wetting, etc.), the relationship of this person to you, and the treatment received.: no    "

## 2023-01-23 ENCOUNTER — HOSPITAL ENCOUNTER (OUTPATIENT)
Dept: RADIOLOGY | Facility: MEDICAL CENTER | Age: 58
End: 2023-01-23
Attending: FAMILY MEDICINE
Payer: COMMERCIAL

## 2023-01-23 DIAGNOSIS — R06.02 SHORTNESS OF BREATH: ICD-10-CM

## 2023-01-23 DIAGNOSIS — U09.9 POST COVID-19 CONDITION, UNSPECIFIED: ICD-10-CM

## 2023-01-23 PROCEDURE — 700117 HCHG RX CONTRAST REV CODE 255: Performed by: FAMILY MEDICINE

## 2023-01-23 PROCEDURE — 71046 X-RAY EXAM CHEST 2 VIEWS: CPT

## 2023-01-23 PROCEDURE — 71275 CT ANGIOGRAPHY CHEST: CPT

## 2023-01-23 RX ADMIN — IOHEXOL 33 ML: 350 INJECTION, SOLUTION INTRAVENOUS at 10:56

## 2023-01-23 RX ADMIN — IOHEXOL 100 ML: 350 INJECTION, SOLUTION INTRAVENOUS at 10:56

## 2023-01-30 ENCOUNTER — APPOINTMENT (RX ONLY)
Dept: URBAN - METROPOLITAN AREA CLINIC 20 | Facility: CLINIC | Age: 58
Setting detail: DERMATOLOGY
End: 2023-01-30

## 2023-01-30 DIAGNOSIS — D22 MELANOCYTIC NEVI: ICD-10-CM | Status: STABLE

## 2023-01-30 DIAGNOSIS — Z71.89 OTHER SPECIFIED COUNSELING: ICD-10-CM

## 2023-01-30 DIAGNOSIS — D18.0 HEMANGIOMA: ICD-10-CM

## 2023-01-30 DIAGNOSIS — L81.4 OTHER MELANIN HYPERPIGMENTATION: ICD-10-CM

## 2023-01-30 DIAGNOSIS — L82.1 OTHER SEBORRHEIC KERATOSIS: ICD-10-CM

## 2023-01-30 PROBLEM — D18.01 HEMANGIOMA OF SKIN AND SUBCUTANEOUS TISSUE: Status: ACTIVE | Noted: 2023-01-30

## 2023-01-30 PROBLEM — D22.61 MELANOCYTIC NEVI OF RIGHT UPPER LIMB, INCLUDING SHOULDER: Status: ACTIVE | Noted: 2023-01-30

## 2023-01-30 PROBLEM — D22.39 MELANOCYTIC NEVI OF OTHER PARTS OF FACE: Status: ACTIVE | Noted: 2023-01-30

## 2023-01-30 PROBLEM — D22.5 MELANOCYTIC NEVI OF TRUNK: Status: ACTIVE | Noted: 2023-01-30

## 2023-01-30 PROBLEM — D22.72 MELANOCYTIC NEVI OF LEFT LOWER LIMB, INCLUDING HIP: Status: ACTIVE | Noted: 2023-01-30

## 2023-01-30 PROBLEM — D22.62 MELANOCYTIC NEVI OF LEFT UPPER LIMB, INCLUDING SHOULDER: Status: ACTIVE | Noted: 2023-01-30

## 2023-01-30 PROBLEM — D22.71 MELANOCYTIC NEVI OF RIGHT LOWER LIMB, INCLUDING HIP: Status: ACTIVE | Noted: 2023-01-30

## 2023-01-30 PROCEDURE — ? SUNSCREEN RECOMMENDATIONS

## 2023-01-30 PROCEDURE — ? DEFER

## 2023-01-30 PROCEDURE — ? COUNSELING

## 2023-01-30 PROCEDURE — ? ADDITIONAL NOTES

## 2023-01-30 PROCEDURE — 99213 OFFICE O/P EST LOW 20 MIN: CPT

## 2023-01-30 PROCEDURE — ? OBSERVATION AND MEASURE

## 2023-01-30 ASSESSMENT — LOCATION SIMPLE DESCRIPTION DERM
LOCATION SIMPLE: RIGHT POSTERIOR THIGH
LOCATION SIMPLE: LEFT FOREARM
LOCATION SIMPLE: RIGHT POSTERIOR UPPER ARM
LOCATION SIMPLE: LEFT PRETIBIAL REGION
LOCATION SIMPLE: LEFT POSTERIOR THIGH
LOCATION SIMPLE: RIGHT CHEEK
LOCATION SIMPLE: LEFT POSTERIOR UPPER ARM
LOCATION SIMPLE: RIGHT PRETIBIAL REGION
LOCATION SIMPLE: RIGHT UPPER BACK
LOCATION SIMPLE: RIGHT FOREARM
LOCATION SIMPLE: RIGHT FOREHEAD
LOCATION SIMPLE: RIGHT LOWER BACK
LOCATION SIMPLE: UPPER BACK

## 2023-01-30 ASSESSMENT — LOCATION DETAILED DESCRIPTION DERM
LOCATION DETAILED: INFERIOR THORACIC SPINE
LOCATION DETAILED: RIGHT CENTRAL BUCCAL CHEEK
LOCATION DETAILED: LEFT PROXIMAL POSTERIOR UPPER ARM
LOCATION DETAILED: LEFT DISTAL POSTERIOR THIGH
LOCATION DETAILED: RIGHT INFERIOR MEDIAL UPPER BACK
LOCATION DETAILED: RIGHT VENTRAL PROXIMAL FOREARM
LOCATION DETAILED: RIGHT DISTAL POSTERIOR UPPER ARM
LOCATION DETAILED: RIGHT INFERIOR FOREHEAD
LOCATION DETAILED: RIGHT PROXIMAL PRETIBIAL REGION
LOCATION DETAILED: RIGHT DISTAL POSTERIOR THIGH
LOCATION DETAILED: RIGHT INFERIOR MEDIAL MIDBACK
LOCATION DETAILED: LEFT VENTRAL PROXIMAL FOREARM
LOCATION DETAILED: RIGHT INFERIOR CENTRAL MALAR CHEEK
LOCATION DETAILED: RIGHT SUPERIOR UPPER BACK
LOCATION DETAILED: LEFT LATERAL PROXIMAL PRETIBIAL REGION

## 2023-01-30 ASSESSMENT — LOCATION ZONE DERM
LOCATION ZONE: TRUNK
LOCATION ZONE: ARM
LOCATION ZONE: LEG
LOCATION ZONE: FACE

## 2023-01-30 NOTE — PROCEDURE: ADDITIONAL NOTES
Additional Notes: Pt would like lesion removed as it has been recently frequently irritated. Mole has remained stable in size. Will refer to Dr. Raudel San for evaluation/management.
Detail Level: Detailed
Render Risk Assessment In Note?: no

## 2023-01-30 NOTE — PROCEDURE: DEFER
Introduction Text (Please End With A Colon): The following procedure was deferred: PT opts to  have bx/treatment done at [facility]

## 2023-02-10 ENCOUNTER — APPOINTMENT (RX ONLY)
Dept: URBAN - METROPOLITAN AREA CLINIC 20 | Facility: CLINIC | Age: 58
Setting detail: DERMATOLOGY
End: 2023-02-10

## 2023-02-10 DIAGNOSIS — D18.0 HEMANGIOMA: ICD-10-CM

## 2023-02-10 DIAGNOSIS — L82.1 OTHER SEBORRHEIC KERATOSIS: ICD-10-CM

## 2023-02-10 PROBLEM — D18.01 HEMANGIOMA OF SKIN AND SUBCUTANEOUS TISSUE: Status: ACTIVE | Noted: 2023-02-10

## 2023-02-10 PROCEDURE — ? ELECTRODESICCATION (COSMETIC)

## 2023-02-10 PROCEDURE — ? LIQUID NITROGEN (COSMETIC)

## 2023-02-10 ASSESSMENT — LOCATION SIMPLE DESCRIPTION DERM
LOCATION SIMPLE: RIGHT LOWER BACK
LOCATION SIMPLE: CHEST
LOCATION SIMPLE: RIGHT UPPER BACK
LOCATION SIMPLE: RIGHT SHOULDER
LOCATION SIMPLE: ABDOMEN
LOCATION SIMPLE: LEFT UPPER ARM
LOCATION SIMPLE: RIGHT BREAST
LOCATION SIMPLE: LEFT UPPER BACK
LOCATION SIMPLE: LEFT LOWER BACK

## 2023-02-10 ASSESSMENT — LOCATION DETAILED DESCRIPTION DERM
LOCATION DETAILED: RIGHT ANTERIOR SHOULDER
LOCATION DETAILED: LEFT SUPERIOR LATERAL UPPER BACK
LOCATION DETAILED: RIGHT MID-UPPER BACK
LOCATION DETAILED: LEFT MID-UPPER BACK
LOCATION DETAILED: RIGHT SUPERIOR UPPER BACK
LOCATION DETAILED: LEFT SUPERIOR UPPER BACK
LOCATION DETAILED: EPIGASTRIC SKIN
LOCATION DETAILED: RIGHT POSTERIOR SHOULDER
LOCATION DETAILED: LEFT SUPERIOR MEDIAL UPPER BACK
LOCATION DETAILED: LEFT SUPERIOR MEDIAL LOWER BACK
LOCATION DETAILED: RIGHT INFERIOR LATERAL MIDBACK
LOCATION DETAILED: LEFT LATERAL SUPERIOR CHEST
LOCATION DETAILED: RIGHT LATERAL UPPER BACK
LOCATION DETAILED: LEFT ANTERIOR PROXIMAL UPPER ARM
LOCATION DETAILED: LEFT SUPERIOR MEDIAL MIDBACK
LOCATION DETAILED: PERIUMBILICAL SKIN
LOCATION DETAILED: LEFT INFERIOR UPPER BACK
LOCATION DETAILED: RIGHT LATERAL ABDOMEN
LOCATION DETAILED: RIGHT MEDIAL BREAST 2-3:00 REGION
LOCATION DETAILED: LEFT RIB CAGE
LOCATION DETAILED: RIGHT SUPERIOR LATERAL MIDBACK
LOCATION DETAILED: RIGHT RIB CAGE

## 2023-02-10 ASSESSMENT — LOCATION ZONE DERM
LOCATION ZONE: ARM
LOCATION ZONE: TRUNK

## 2023-02-10 NOTE — PROCEDURE: LIQUID NITROGEN (COSMETIC)
Show Spray Paint Technique Variable?: Yes
Detail Level: Simple
Post-Care Instructions: I reviewed with the patient in detail post-care instructions. Patient is to wear sunprotection, and avoid picking at any of the treated lesions. Pt may apply Vaseline to crusted or scabbing areas.
Consent: The patient's consent was obtained including but not limited to risks of crusting, scabbing, blistering, scarring, darker or lighter pigmentary change, recurrence, incomplete removal and infection. The patient understands that the procedure is cosmetic in nature and is not covered by insurance.
Spray Paint Technique: No
Price (Use Numbers Only, No Special Characters Or $): 0
Billing Information: Bill by Static Price
Spray Paint Text: The liquid nitrogen was applied to the skin utilizing a spray paint frosting technique.

## 2023-02-10 NOTE — PROCEDURE: ELECTRODESICCATION (COSMETIC)
Consent: The patient's consent was obtained including but not limited to risks of crusting, scabbing, blistering, scarring, darker or lighter pigmentary change, recurrence, incomplete removal and infection.
Anesthesia Volume In Cc: 0.5
Detail Level: Zone
Post-Care Instructions: I reviewed with the patient in detail post-care instructions. Patient is to wear sunprotection, and avoid picking at any of the treated lesions. Pt may apply Vaseline to crusted or scabbing areas
Price (Use Numbers Only, No Special Characters Or $): 210
Anesthesia Type: 0.05% lidocaine without epinephrine

## 2023-02-24 ENCOUNTER — HOME STUDY (OUTPATIENT)
Dept: SLEEP MEDICINE | Facility: MEDICAL CENTER | Age: 58
End: 2023-02-24
Attending: PREVENTIVE MEDICINE
Payer: COMMERCIAL

## 2023-02-24 DIAGNOSIS — R06.83 SNORING: ICD-10-CM

## 2023-02-24 DIAGNOSIS — G47.30 BREATHING-RELATED SLEEP DISORDER: ICD-10-CM

## 2023-02-26 PROCEDURE — 95800 SLP STDY UNATTENDED: CPT | Performed by: STUDENT IN AN ORGANIZED HEALTH CARE EDUCATION/TRAINING PROGRAM

## 2023-03-10 NOTE — PROCEDURES
DIAGNOSTIC HOME SLEEP TEST (HST) REPORT WatchPAT      PATIENT ID:  NAME:  Vicky Nino  MRN:               [id number]  YOB: 1965  DATE OF STUDY: 02/26/2023      Impression:     This study shows evidence of:      1. Moderate obstructive sleep apnea with PAT apnea hyponea index(pAHI) of 21.1 per hour.  PAT respiratory disturbance index (pRDI) was 28 per hour. These findings are based on 7 channels recording of PAT signal with sleep staging, heart rate, pulse oximetry, actigraphy, body position, snoring and respiratory movement.     2. Oxygenation O2 Sat. mean O2 sat was 91%,  isael was 83%,  and maximum O2 at 95 %. O2 sat was at or  below 88% for 17.1 min of evaluation time. Oxygen Desaturation (>=4%) Index was elevated at 11.1/hr. AVG HR was 56 BPM.      TECHNICAL DESCRIPTION: Patient underwent home sleep apnea testing with peripheral arterial tone signal (WatchPAT™). This is a Type IV portable monitor and device. Monitoring was done with 7 channels recording of PAT signal with sleep staging, heart rate, pulse oximetry, actigraphy, body position, snoring and respiratory movement. Prior to using the device, the patient received verbal and written instructions for its application and was provided with the help desk phone number for additional telephonic instruction with 24-hour availability of qualified personnel to answer questions.    Respiratory events:    pRDI: Total 179 (REM index 51.2/hr. NREM index 24.2/hr, All Night 28/hr)    3% pAHI: Total 135 (REM index 44.5/hr. NREM index 17.3/hr, All Night 21.1/hr)    3% pAHIc: Total 0 ( All Night 0/hr)    4% pAHI: Total 91 (All Night 14.2/hr)    General sleep summary: . Total recording time is 8 hours and 3 minutes and total Sleep time is 6 hours and 30 minutes. The patient spent 239.5 minutes in the supine position and 145.8 minutes in the nonsupine position.      Recommendations:    1. CPAP titration study vs Auto CPAP trial .   2.   In  general patients with sleep apnea are advised to avoid alcohol and sedatives and to not operate a motor vehicle while drowsy. In some cases alternative treatment options may prove effective in resolving sleep apnea in these options include upper airway surgery, the use of a dental orthotic or weight loss and positional therapy. Clinical correlation is required.         Brian Box MD

## 2023-04-11 ENCOUNTER — OFFICE VISIT (OUTPATIENT)
Dept: SLEEP MEDICINE | Facility: MEDICAL CENTER | Age: 58
End: 2023-04-11
Attending: PREVENTIVE MEDICINE
Payer: COMMERCIAL

## 2023-04-11 VITALS
HEIGHT: 65 IN | DIASTOLIC BLOOD PRESSURE: 74 MMHG | HEART RATE: 61 BPM | WEIGHT: 211 LBS | OXYGEN SATURATION: 99 % | SYSTOLIC BLOOD PRESSURE: 124 MMHG | BODY MASS INDEX: 35.16 KG/M2 | RESPIRATION RATE: 16 BRPM

## 2023-04-11 DIAGNOSIS — G47.33 OSA (OBSTRUCTIVE SLEEP APNEA): ICD-10-CM

## 2023-04-11 PROCEDURE — 99212 OFFICE O/P EST SF 10 MIN: CPT | Performed by: PREVENTIVE MEDICINE

## 2023-04-11 PROCEDURE — 99214 OFFICE O/P EST MOD 30 MIN: CPT | Performed by: PREVENTIVE MEDICINE

## 2023-04-11 NOTE — PROGRESS NOTES
"CHIEF COMPLIANT: \"How did I do on my sleep study?\"   LAST SEEN:  Dr. Leblanc on  1/19/23  HISTORY OF PRESENT ILLNESS:  Vicky Nino is a 57 y.o.female who is here for sleep study results.     Sleep study results:  TYPE: HST   DATE: 02/24/23  Diagnostic:pAHI 21.1  Diagnostic  Oxygen Ariel: 83% with 17.1mins at or under 88%     Significant comorbidities and modifying factors: see below     PAST MEDICAL HISTORY:  Past Medical History:   Diagnosis Date    Arrhythmia     rapid heart rate     Asthma     has not used inhaler in a year    Atrial fibrillation (HCC)     Chest discomfort 11/18/2013    Chickenpox     Cough     Depression     High cholesterol     History of anemia     Hypertension     Obesity     Palpitations 11/18/2013    Psychiatric disorder     anxiety    Rhinitis     Snoring     Ventricular bigeminy 11/18/2013    Wears glasses     Weight loss       PROBLEM LIST:  Patient Active Problem List    Diagnosis Date Noted    Deviated septum 08/26/2022    Metabolic syndrome 11/19/2019    Chronic fatigue 11/19/2019    History of meniscal tear 05/21/2019    Morbid obesity with BMI of 40.0-44.9, adult ([facility]) 05/04/2018    Dense breast tissue on mammogram 05/04/2018    Depression 03/26/2018    Abnormal CBC 10/20/2016    HSV-1 infection 10/20/2016    Elevated fasting glucose 10/06/2016    Hyperlipidemia 10/06/2016    HSV infection 10/06/2016    History of cold sores 10/06/2016    Family history of diabetes mellitus in mother 10/06/2016    Family history of colon cancer 10/06/2016    Asthma in adult 04/24/2015    Environmental allergies 04/24/2015    Essential hypertension 07/01/2014     PAST SOCIAL HISTORY:  Past Surgical History:   Procedure Laterality Date    KNEE ARTHROSCOPY Right 06/03/2019    Procedure: ARTHROSCOPY, KNEE;  Surgeon: Jag Gomez M.D.;  Location: SURGERY [facility];  Service: Orthopedics    MENISCECTOMY, KNEE, MEDIAL Right 06/03/2019    Procedure: MENISCECTOMY, KNEE, MEDIAL, LATERAL-  " PARTIAL;  Surgeon: Jag Gomez M.D.;  Location: SURGERY [facility];  Service: Orthopedics    CHONDROPLASTY Right 06/03/2019    Procedure: CHONDROPLASTY- patella;  Surgeon: Jag Gomez M.D.;  Location: SURGERY [facility];  Service: Orthopedics    ARTHROSCOPY, KNEE      COLONOSCOPY       PAST FAMILY HISTORY:  Family History   Problem Relation Age of Onset    Diabetes Mother     Genitourinary () Problems Mother         renal failure, kidey transplant    Hypertension Mother     Heart Disease Mother     Kidney Disease Mother     Thyroid Sister         2 with removal due to Graves    Hypertension Brother     Cancer Brother 60        colon cancer     Colon Cancer Brother     Cancer Paternal Grandmother         Breast cancer    Cancer Paternal Grandfather         lung    Cancer Maternal Aunt     Hypertension Father     Cancer Maternal Grandmother         Skin Cancer    Cancer Maternal Aunt         Breast cancer    Sleep Apnea Sister      SOCIAL HISTORY:  Social History     Socioeconomic History    Marital status:      Spouse name: Not on file    Number of children: Not on file    Years of education: Not on file    Highest education level: Not on file   Occupational History    Not on file   Tobacco Use    Smoking status: Never    Smokeless tobacco: Never   Vaping Use    Vaping Use: Never used   Substance and Sexual Activity    Alcohol use: Not Currently     Comment: rarely drink    Drug use: Never    Sexual activity: Not Currently     Partners: Male   Other Topics Concern    Not on file   Social History Narrative    Not on file     Social Determinants of Health     Financial Resource Strain: Not on file   Food Insecurity: Not on file   Transportation Needs: Not on file   Physical Activity: Not on file   Stress: Not on file   Social Connections: Not on file   Intimate Partner Violence: Not on file   Housing Stability: Not on file     ALLERGIES: Patient has no known  "allergies.  MEDICATIONS:  Current Outpatient Medications   Medication Sig Dispense Refill    omeprazole (PRILOSEC) 20 MG delayed-release capsule Take 20 mg by mouth every day.      fluticasone-salmeterol (ADVAIR) 250-50 MCG/ACT AEROSOL POWDER, BREATH ACTIVATED Inhale 1 Puff every 12 hours. 60 Each 2    amLODIPine (NORVASC) 5 MG Tab TAKE 1 TABLET BY MOUTH EVERY DAY 90 Tablet 0    metoprolol SR (TOPROL XL) 25 MG TABLET SR 24 HR Take 1 Tablet by mouth every day. 90 Tablet 3    acyclovir (ZOVIRAX) 800 MG Tab Take 1 Tablet by mouth 3 times a day. TAKE 1 TABLET ORALLY 3 TIMES A DAY 30 Tablet 3    albuterol 108 (90 Base) MCG/ACT Aero Soln inhalation aerosol INHALE 2 PUFFS BY MOUTH EVERY 6 HOURS AS NEEDED FOR SHORTNESS OF BREATH 8.5 Inhaler 1    fluticasone (FLONASE) 50 MCG/ACT nasal spray Spray 2 Sprays in nose every day. 1 Bottle 11    multivitamin (THERAGRAN) Tab Take 1 Tab by mouth every day.      COLLAGEN PO Take  by mouth every day. 1/2 scoop      Cholecalciferol (VITAMIN D) 1000 UNIT CAPS Take 1 Cap by mouth every day.       No current facility-administered medications for this visit.    \"CURRENT RX\"    REVIEW OF SYSTEMS:  Constitutional: Denies weight loss, endorses chronic daytime fatigue  Eyes: Denies vision changes  Ears/Nose/Mouth/Throat: Denies rhinitis/nasal congestion, injury, decayed teeth/toothaches.  Cardiovascular: Denies chest pain, tightness, palpitations, swelling in legs/feet, difficulty breathing when lying down but gets better when sitting up.   Respiratory: Denies shortness of breath while awake,  Sleep: per HPI  Gastrointestinal: Denies  difficulty swallowing,  heartburn.  Genitourinary: REPORTS nocturia  Musculoskeletal: Denies painful joints, sore muscles, back pain.   Neurological: Denies frequent headaches,weakness, dizziness.    PHYSICAL EXAM/VITALS:  /74 (BP Location: Left arm, Patient Position: Sitting, BP Cuff Size: Adult)   Pulse 61   Resp 16   Ht 1.651 m (5' 5\")   Wt 95.7 kg " (211 lb)   LMP 09/18/2013   SpO2 99%   BMI 35.11 kg/m²   Appearance: Well-nourished, well-developed,  looks stated age, no acute distress  Eyes:   EOMI  ENMT: MASKED  Neck: Supple, trachea midline  Respiratory effort:  No intercostal retractions or use of accessory muscles  Musculoskeletal:  Grossly normal; gait and station normal  Neurologic:  oriented to person, time, place, and purpose; judgement intact  Psychiatric:  No depression, anxiety, agitation      MEDICAL DECISION MAKING:  The medical record was reviewed as it pertains to this referral. This includes records from primary care,consultants notes, referral request, hospital records, labs and imaging. Any available diagnostic and titration nocturnal polysomnograms, home sleep apnea tests, continuous nocturnal oximetry results, multiple sleep latency tests, and recent compliance reports were reviewed with the patient.    ASSESSMENT/PLAN:  Vciky Nino is a 57 y.o.female who has moderate PHIL will do well on a ResMed APAP with initial settings from min 6 to max 16 cm H20. Careful mask fit and heated humidification are required part of this prescription. This patient will need to meet all insurance compliance requirements. Patient will need an oximetry study once compliant on PAP to confirm oxygen stability.       1. PHIL (obstructive sleep apnea)  - DME CPAP  - DME Mask Fitting; Future        MEETING INSURANCE COMPLIANCE REQUIREMENTS and MISC tips:     The patient has 90 days in order to be deemed compliant by the insurance company.  The 90-day starts the day that he receives the machine and supplies from the DME.  It is during this period of time that the patient must demonstrate a consistent use of pap (greater than 4 hours/day for a minimum of 24 days out of 30).  The patient is aware that  PAP usage ( time) will be added up--  together over a 24-hour period.  This simply means that the patient does not have to use the machine for 4 hours in a row and  he does not have to be asleep for the minutes to count towards the required 4 hours.      It is recommended to the patient that he begin Pap therapy by first just wearing the mask and headgear loosely applied to the face for 20 minutes a day for the first 2 days.  Then  the patient may begin using the mask and headgear with the machine to get a feel for using the mask with a good seal This can be accomplished by using the entire PAP set up with the machine on for 20 to 30 minutes while the patient is awake.  Try this for 2 days.  These usage exercises will  allow the face and brain to get accustomed to mask, headgear and air pressures.      Also  the patient is instructed to keep the machine located slightly ( 6-12 ins)  below the level of the mattress.  This allows for proper humidification of the air before it reaches nasal passages and prevents inhaling water droplets.      Cleaning the mask, headgear, tubing, water reservoir to be done using hot water and a gentle detergent once a week.    The mask can be removed from the headgear and rinsed under hot water daily.  There is no special machine or device that is needed to keep the machine or supplies clean.    The risks of untreated sleep apnea were discussed with the patient at length. Patients with PHIL are at increased risk of cardiovascular disease including coronary artery disease, systemic arterial hypertension, pulmonary arterial hypertension, cardiac arrhythmias, and stroke. PHIL patients have an increased risk of motor vehicle accidents, type 2 diabetes, chronic kidney disease, and non-alcoholic liver disease. The patient was advised to avoid driving a motor vehicle when drowsy.  Have advised the patient to follow up with the appropriate healthcare practitioners for all other medical problems and issues.    RETURN TO CLINIC: Return in about 8 weeks (around 6/6/2023) for Compliance check.    My total time spent caring for the patient on the day of the  encounter was 40 minutes. This includes time spent on a thorough chart review including other physician notes, all sleep studies, as well as critical labs and pulmonary and cardiac studies.  Additionally, it includes a thorough discussion of good sleep hygiene and stimulus control, as well as  the need for consistency in terms of sleep preparation and practice.    Please note that this dictation was created using voice recognition software.  I have made every reasonable attempt to correct obvious errors, I expect that there are errors of grammar and possibly content that I did not discover before finalizing this note.

## 2023-04-23 DIAGNOSIS — I10 ESSENTIAL HYPERTENSION: ICD-10-CM

## 2023-04-24 RX ORDER — AMLODIPINE BESYLATE 5 MG/1
TABLET ORAL
Qty: 90 TABLET | Refills: 0 | Status: SHIPPED | OUTPATIENT
Start: 2023-04-24 | End: 2023-08-31

## 2023-06-08 ENCOUNTER — APPOINTMENT (OUTPATIENT)
Dept: SLEEP MEDICINE | Facility: MEDICAL CENTER | Age: 58
End: 2023-06-08
Attending: PREVENTIVE MEDICINE
Payer: COMMERCIAL

## 2023-06-08 ENCOUNTER — TELEPHONE (OUTPATIENT)
Dept: MEDICAL GROUP | Facility: LAB | Age: 58
End: 2023-06-08

## 2023-06-08 ENCOUNTER — HOSPITAL ENCOUNTER (OUTPATIENT)
Dept: RADIOLOGY | Facility: MEDICAL CENTER | Age: 58
End: 2023-06-08
Attending: FAMILY MEDICINE
Payer: COMMERCIAL

## 2023-06-08 DIAGNOSIS — Z12.31 ENCOUNTER FOR SCREENING MAMMOGRAM FOR MALIGNANT NEOPLASM OF BREAST: ICD-10-CM

## 2023-06-08 DIAGNOSIS — Z12.31 VISIT FOR SCREENING MAMMOGRAM: ICD-10-CM

## 2023-06-08 NOTE — TELEPHONE ENCOUNTER
Pt lvm requesting referral to [facility] on [address]. pt has inverted nipple 3 d imaging pt was there today for appt. Pt was told to get order from primary Provider

## 2023-06-21 ENCOUNTER — HOSPITAL ENCOUNTER (OUTPATIENT)
Dept: RADIOLOGY | Facility: MEDICAL CENTER | Age: 58
End: 2023-06-21
Attending: FAMILY MEDICINE
Payer: COMMERCIAL

## 2023-06-21 DIAGNOSIS — N64.59 INVERTED NIPPLE: ICD-10-CM

## 2023-06-21 PROCEDURE — G0279 TOMOSYNTHESIS, MAMMO: HCPCS

## 2023-06-26 ENCOUNTER — OFFICE VISIT (OUTPATIENT)
Dept: MEDICAL GROUP | Facility: IMAGING CENTER | Age: 58
End: 2023-06-26
Payer: COMMERCIAL

## 2023-06-26 ENCOUNTER — HOSPITAL ENCOUNTER (OUTPATIENT)
Facility: MEDICAL CENTER | Age: 58
End: 2023-06-26
Payer: COMMERCIAL

## 2023-06-26 VITALS
BODY MASS INDEX: 35.61 KG/M2 | TEMPERATURE: 97 F | WEIGHT: 214 LBS | HEART RATE: 92 BPM | DIASTOLIC BLOOD PRESSURE: 86 MMHG | SYSTOLIC BLOOD PRESSURE: 132 MMHG | OXYGEN SATURATION: 94 %

## 2023-06-26 DIAGNOSIS — R82.998 LEUKOCYTES IN URINE: ICD-10-CM

## 2023-06-26 DIAGNOSIS — N39.0 URINARY TRACT INFECTION WITHOUT HEMATURIA, SITE UNSPECIFIED: ICD-10-CM

## 2023-06-26 LAB
APPEARANCE UR: CLEAR
BILIRUB UR STRIP-MCNC: NORMAL MG/DL
COLOR UR AUTO: YELLOW
GLUCOSE UR STRIP.AUTO-MCNC: NORMAL MG/DL
KETONES UR STRIP.AUTO-MCNC: NORMAL MG/DL
LEUKOCYTE ESTERASE UR QL STRIP.AUTO: NORMAL
NITRITE UR QL STRIP.AUTO: NORMAL
PH UR STRIP.AUTO: 7 [PH] (ref 5–8)
PROT UR QL STRIP: NORMAL MG/DL
RBC UR QL AUTO: NORMAL
SP GR UR STRIP.AUTO: 0.01
UROBILINOGEN UR STRIP-MCNC: 0.2 MG/DL

## 2023-06-26 PROCEDURE — 3079F DIAST BP 80-89 MM HG: CPT

## 2023-06-26 PROCEDURE — 87086 URINE CULTURE/COLONY COUNT: CPT

## 2023-06-26 PROCEDURE — 81002 URINALYSIS NONAUTO W/O SCOPE: CPT

## 2023-06-26 PROCEDURE — 3075F SYST BP GE 130 - 139MM HG: CPT

## 2023-06-26 PROCEDURE — 99213 OFFICE O/P EST LOW 20 MIN: CPT

## 2023-06-26 RX ORDER — PHENAZOPYRIDINE HYDROCHLORIDE 200 MG/1
200 TABLET, FILM COATED ORAL 3 TIMES DAILY PRN
Qty: 6 TABLET | Refills: 0 | Status: SHIPPED | OUTPATIENT
Start: 2023-06-26 | End: 2023-06-28

## 2023-06-26 RX ORDER — SULFAMETHOXAZOLE AND TRIMETHOPRIM 800; 160 MG/1; MG/1
1 TABLET ORAL 2 TIMES DAILY
Qty: 6 TABLET | Refills: 0 | Status: SHIPPED | OUTPATIENT
Start: 2023-06-26 | End: 2023-06-29

## 2023-06-26 ASSESSMENT — PATIENT HEALTH QUESTIONNAIRE - PHQ9: CLINICAL INTERPRETATION OF PHQ2 SCORE: 0

## 2023-06-26 NOTE — PROGRESS NOTES
Chief Complaint   Patient presents with    UTI       HISTORY OF THE PRESENT ILLNESS: Patient is a 58 y.o. female. This pleasant patient is here today to establish care and to discuss:    Patient reports developing suprapubic discomfort with urinary urgency and frequency, and hesitancy.  She is having chills without persistent high fevers.  She has increased her fluid intake.  She denies dysuria or hematuria.  Denies vaginal discharge.  She has had a UTI in the past.    Denies nausea, vomiting, flank pain.       Allergies: Patient has no known allergies.    Current Outpatient Medications Ordered in Epic   Medication Sig Dispense Refill    sulfamethoxazole-trimethoprim (BACTRIM DS) 800-160 MG tablet Take 1 Tablet by mouth 2 times a day for 3 days. 6 Tablet 0    phenazopyridine (PYRIDIUM) 200 MG Tab Take 1 Tablet by mouth 3 times a day as needed for Moderate Pain for up to 2 days. 6 Tablet 0    amLODIPine (NORVASC) 5 MG Tab TAKE 1 TABLET BY MOUTH EVERY DAY 90 Tablet 0    omeprazole (PRILOSEC) 20 MG delayed-release capsule Take 20 mg by mouth every day.      fluticasone-salmeterol (ADVAIR) 250-50 MCG/ACT AEROSOL POWDER, BREATH ACTIVATED Inhale 1 Puff every 12 hours. 60 Each 2    metoprolol SR (TOPROL XL) 25 MG TABLET SR 24 HR Take 1 Tablet by mouth every day. 90 Tablet 3    acyclovir (ZOVIRAX) 800 MG Tab Take 1 Tablet by mouth 3 times a day. TAKE 1 TABLET ORALLY 3 TIMES A DAY 30 Tablet 3    albuterol 108 (90 Base) MCG/ACT Aero Soln inhalation aerosol INHALE 2 PUFFS BY MOUTH EVERY 6 HOURS AS NEEDED FOR SHORTNESS OF BREATH 8.5 Inhaler 1    fluticasone (FLONASE) 50 MCG/ACT nasal spray Spray 2 Sprays in nose every day. 1 Bottle 11    multivitamin (THERAGRAN) Tab Take 1 Tab by mouth every day.      COLLAGEN PO Take  by mouth every day. 1/2 scoop      Cholecalciferol (VITAMIN D) 1000 UNIT CAPS Take 1 Cap by mouth every day.       No current [facility]-ordered facility-administered medications on file.       Past Medical History:    Diagnosis Date    Arrhythmia     rapid heart rate     Asthma     has not used inhaler in a year    Atrial fibrillation (HCC)     Chest discomfort 11/18/2013    Chickenpox     Cough     Depression     High cholesterol     History of anemia     Hypertension     Obesity     Palpitations 11/18/2013    Psychiatric disorder     anxiety    Rhinitis     Snoring     Ventricular bigeminy 11/18/2013    Wears glasses     Weight loss        Past Surgical History:   Procedure Laterality Date    KNEE ARTHROSCOPY Right 06/03/2019    Procedure: ARTHROSCOPY, KNEE;  Surgeon: Jag Gomez M.D.;  Location: SURGERY [facility];  Service: Orthopedics    MENISCECTOMY, KNEE, MEDIAL Right 06/03/2019    Procedure: MENISCECTOMY, KNEE, MEDIAL, LATERAL-  PARTIAL;  Surgeon: Jag Gomez M.D.;  Location: SURGERY [facility];  Service: Orthopedics    CHONDROPLASTY Right 06/03/2019    Procedure: CHONDROPLASTY- patella;  Surgeon: Jag Gomez M.D.;  Location: SURGERY [facility];  Service: Orthopedics    ARTHROSCOPY, KNEE      COLONOSCOPY         Social History     Tobacco Use    Smoking status: Never    Smokeless tobacco: Never   Vaping Use    Vaping Use: Never used   Substance Use Topics    Alcohol use: Not Currently     Comment: rarely drink    Drug use: Never       Social History     Social History Narrative    Not on file       Family History   Problem Relation Age of Onset    Diabetes Mother     Genitourinary () Problems Mother         renal failure, kidey transplant    Hypertension Mother     Heart Disease Mother     Kidney Disease Mother     Thyroid Sister         2 with removal due to Graves    Hypertension Brother     Cancer Brother 60        colon cancer     Colon Cancer Brother     Cancer Paternal Grandmother         Breast cancer    Cancer Paternal Grandfather         lung    Cancer Maternal Aunt     Hypertension Father     Cancer Maternal Grandmother         Skin Cancer    Cancer Maternal Aunt         Breast  cancer    Sleep Apnea Sister      ROS: per vincent  Gen: no fevers/chills  Pulm: no sob, no cough  CV: no chest pain, no palpitations, no edema  GI: no nausea/vomiting, no diarrhea  Skin: no rash    Exam: /86 (BP Location: Right arm, Patient Position: Sitting, BP Cuff Size: Adult long)   Pulse 92   Temp 36.1 °C (97 °F) (Temporal)   Wt 97.1 kg (214 lb)   SpO2 94%  Body mass index is 35.61 kg/m².    Physical examination done through observation  Constitutional: Alert, no distress, well-groomed.  Skin: Warm, dry, good turgor, no rashes in visible areas.  Eye: Equal, round and reactive, conjunctiva clear, lids normal.  ENMT: Lips without lesions, good dentition, moist mucous membranes.  Neck: Trachea midline, no masses, no thyromegaly.  Respiratory: Unlabored respiratory effort, no cough.  MSK: Normal gait, moves all extremities.  Neuro: Grossly non-focal.   Psych: Alert and oriented x3, normal affect and mood.     Please note that this dictation was created using voice recognition software. I have made every reasonable attempt to correct obvious errors, but I expect that there are errors of grammar and possibly content that I did not discover before finalizing the note.      Assessment/Plan    58 y.o. female with the following -    1. Urinary tract infection without hematuria, site unspecified  2. Leukocytes in urine  Acute, ongoing condition.  Patient presentation consistent with UTI.  Urinalysis is positive for leukocytes, will send for urine culture.  Discussed treatment with Bactrim for this, side effects discussed. Advised patient to take and complete prescription as prescribed. Advised patient to increase fluid intake to ensure adequate hydration.   Will treat with pyridium for suprapubic discomfort. IF symptoms persists or do not improve, advised to follow up in clinic. For worsening symptoms such as fever, chills, lethargy, malaise, confusion, nausea, vomiting, or flank pain, go to ER for immediate  evaluation and treatment.    - POCT Urinalysis  - URINE CULTURE(NEW); Future  - sulfamethoxazole-trimethoprim (BACTRIM DS) 800-160 MG tablet; Take 1 Tablet by mouth 2 times a day for 3 days.  Dispense: 6 Tablet; Refill: 0  - phenazopyridine (PYRIDIUM) 200 MG Tab; Take 1 Tablet by mouth 3 times a day as needed for Moderate Pain for up to 2 days.  Dispense: 6 Tablet; Refill: 0    Medical Decision Making/Course:  In the course of preparing for this visit with review of the pertinent past medical history, recent and past clinic visits, current medications, and performing chart, immunization, medical history and medication reconciliation, and in the further course of obtaining the current history pertinent to the clinic visit today, performing an exam and evaluation, ordering and independently evaluating labs, tests, and/or procedures, prescribing any recommended new medications as noted above, providing any pertinent counseling and education and recommending further coordination of care. This was discussed with patient in a shared-decision making conversation, and they understand and agreed with plan of care.     Return if symptoms worsen or fail to improve.    Thank you, Ashlee HERNANDES  [facility]    Please note that this dictation was created using voice recognition software. I have made every reasonable attempt to correct obvious errors, but I expect that there are errors of grammar and possibly content that I did not discover before finalizing the note.

## 2023-06-27 ENCOUNTER — APPOINTMENT (OUTPATIENT)
Dept: SLEEP MEDICINE | Facility: MEDICAL CENTER | Age: 58
End: 2023-06-27
Attending: PREVENTIVE MEDICINE
Payer: COMMERCIAL

## 2023-06-27 DIAGNOSIS — N39.0 URINARY TRACT INFECTION WITHOUT HEMATURIA, SITE UNSPECIFIED: ICD-10-CM

## 2023-06-27 DIAGNOSIS — R82.998 LEUKOCYTES IN URINE: ICD-10-CM

## 2023-06-29 LAB
BACTERIA UR CULT: NORMAL
SIGNIFICANT IND 70042: NORMAL
SITE SITE: NORMAL
SOURCE SOURCE: NORMAL

## 2023-07-06 ENCOUNTER — OFFICE VISIT (OUTPATIENT)
Dept: MEDICAL GROUP | Facility: LAB | Age: 58
End: 2023-07-06
Payer: COMMERCIAL

## 2023-07-06 VITALS
HEIGHT: 65 IN | WEIGHT: 216 LBS | SYSTOLIC BLOOD PRESSURE: 110 MMHG | DIASTOLIC BLOOD PRESSURE: 68 MMHG | HEART RATE: 68 BPM | RESPIRATION RATE: 12 BRPM | BODY MASS INDEX: 35.99 KG/M2 | OXYGEN SATURATION: 98 % | TEMPERATURE: 97.4 F

## 2023-07-06 DIAGNOSIS — E55.9 VITAMIN D DEFICIENCY: ICD-10-CM

## 2023-07-06 DIAGNOSIS — G47.33 OSA (OBSTRUCTIVE SLEEP APNEA): ICD-10-CM

## 2023-07-06 DIAGNOSIS — E66.01 CLASS 2 SEVERE OBESITY DUE TO EXCESS CALORIES WITH SERIOUS COMORBIDITY AND BODY MASS INDEX (BMI) OF 35.0 TO 35.9 IN ADULT (HCC): ICD-10-CM

## 2023-07-06 PROCEDURE — 99214 OFFICE O/P EST MOD 30 MIN: CPT | Performed by: FAMILY MEDICINE

## 2023-07-06 PROCEDURE — 3074F SYST BP LT 130 MM HG: CPT | Performed by: FAMILY MEDICINE

## 2023-07-06 PROCEDURE — 3078F DIAST BP <80 MM HG: CPT | Performed by: FAMILY MEDICINE

## 2023-07-06 NOTE — PROGRESS NOTES
"Subjective:     Chief Complaint   Patient presents with    Follow-Up     Weight         HPI:   Vicky presents today to discuss weight loss.     Diet: Has tried intermittent fasting. Reports \"clean\" eating. Fruits, yogurt, eggs, veggies, lean protein, quinoa or black or brown rice. Similar for dinner. Good protein. Light oils. Some nuts for snack, veggies, hummus.   Exercise: walking with dog a lot, pilates 4-5 times per week. No heavy lifting. Some body weight exercises.   Does feel better.     Sleep: using CPAP, but theres an error on the machine, shuts off.     Some stress of late with work.         Current Outpatient Medications Ordered in Epic   Medication Sig Dispense Refill    amLODIPine (NORVASC) 5 MG Tab TAKE 1 TABLET BY MOUTH EVERY DAY 90 Tablet 0    omeprazole (PRILOSEC) 20 MG delayed-release capsule Take 20 mg by mouth every day.      fluticasone-salmeterol (ADVAIR) 250-50 MCG/ACT AEROSOL POWDER, BREATH ACTIVATED Inhale 1 Puff every 12 hours. 60 Each 2    metoprolol SR (TOPROL XL) 25 MG TABLET SR 24 HR Take 1 Tablet by mouth every day. 90 Tablet 3    acyclovir (ZOVIRAX) 800 MG Tab Take 1 Tablet by mouth 3 times a day. TAKE 1 TABLET ORALLY 3 TIMES A DAY 30 Tablet 3    albuterol 108 (90 Base) MCG/ACT Aero Soln inhalation aerosol INHALE 2 PUFFS BY MOUTH EVERY 6 HOURS AS NEEDED FOR SHORTNESS OF BREATH 8.5 Inhaler 1    fluticasone (FLONASE) 50 MCG/ACT nasal spray Spray 2 Sprays in nose every day. 1 Bottle 11    multivitamin (THERAGRAN) Tab Take 1 Tab by mouth every day.      COLLAGEN PO Take  by mouth every day. 1/2 scoop      Cholecalciferol (VITAMIN D) 1000 UNIT CAPS Take 1 Cap by mouth every day.       No current [facility]-ordered facility-administered medications on file.         ROS:  Gen: no fevers/chills, no changes in weight  Eyes: no changes in vision  ENT: no sore throat, no hearing loss, no bloody nose  Pulm: no sob, no cough  CV: no chest pain, no palpitations  GI: no nausea/vomiting, no diarrhea  : " "no dysuria  MSk: no myalgias  Skin: no rash  Neuro: no headaches, no numbness/tingling  Heme/Lymph: no easy bruising      Objective:     Exam:  /68 (BP Location: Right arm, Patient Position: Sitting, BP Cuff Size: Adult)   Pulse 68   Temp 36.3 °C (97.4 °F)   Resp 12   Ht 1.651 m (5' 5\")   Wt 98 kg (216 lb)   LMP 09/18/2013   SpO2 98%   BMI 35.94 kg/m²  Body mass index is 35.94 kg/m².    Gen: Alert and oriented, No apparent distress.  Neck: Neck is supple without lymphadenopathy.  Lungs: Normal effort, CTA bilaterally, no wheezes, rhonchi, or rales  CV: Regular rate and rhythm. No murmurs, rubs, or gallops.  Ext: No clubbing, cyanosis, edema.      Assessment & Plan:     58 y.o. female with the following -     1. PHIL (obstructive sleep apnea)  Discussed getting into the sleep clinic to get her CPAP machine fixed.  We did discuss the changes this has on the heart and the importance this is for heart and lung function but also weight management as well.  - CBC WITH DIFFERENTIAL; Future    2. Class 2 severe obesity due to excess calories with serious comorbidity and body mass index (BMI) of 35.0 to 35.9 in adult (HCC)  Discussed diet and exercise recommendations.  I do recommend that she go ahead and try to lift a little bit heavier.  She should try to do the amount of weight it is for her to only be able to do 5-6 reps at a time.  We did discuss also increasing her pace and intensity with walking.  She does have plans to join a gym so she is congratulated for this.  We did discuss that healthy metabolic system not always matching the outside size of the package.  Labs are ordered for further risk assessment as well.  She will deftly continue eating some good protein in real food is much as possible.  She is even cut out alcohol and she is congratulated for this.  We did discuss briefly different options for bariatric referral but also medications.  She like to try to continue to work on her lifestyle " habits first.  - Comp Metabolic Panel; Future  - HEMOGLOBIN A1C; Future  - Lipid Profile; Future  - TSH WITH REFLEX TO FT4; Future    3. Vitamin D deficiency    - VITAMIN D,25 HYDROXY (DEFICIENCY); Future            No follow-ups on file.    Please note that this dictation was created using voice recognition software. I have made every reasonable attempt to correct obvious errors, but I expect that there are errors of grammar and possibly content that I did not discover before finalizing the note.

## 2023-08-16 DIAGNOSIS — I48.19 PERSISTENT ATRIAL FIBRILLATION (HCC): ICD-10-CM

## 2023-08-16 NOTE — TELEPHONE ENCOUNTER
Received request via: Pharmacy    Was the patient seen in the last year in this department? Yes  LOV : 7/6/2023   Does the patient have an active prescription (recently filled or refills available) for medication(s) requested? No    Does the patient have residential Plus and need 100 day supply (blood pressure, diabetes and cholesterol meds only)? Patient does not have SCP

## 2023-08-17 RX ORDER — METOPROLOL SUCCINATE 25 MG/1
25 TABLET, EXTENDED RELEASE ORAL DAILY
Qty: 90 TABLET | Refills: 3 | Status: SHIPPED | OUTPATIENT
Start: 2023-08-17 | End: 2023-09-02

## 2023-08-31 DIAGNOSIS — I10 ESSENTIAL HYPERTENSION: ICD-10-CM

## 2023-08-31 RX ORDER — AMLODIPINE BESYLATE 5 MG/1
TABLET ORAL
Qty: 90 TABLET | Refills: 0 | Status: SHIPPED | OUTPATIENT
Start: 2023-08-31 | End: 2023-12-07

## 2023-08-31 NOTE — TELEPHONE ENCOUNTER
Received request via: Pharmacy    Was the patient seen in the last year in this department? Yes  7/6/23  Does the patient have an active prescription (recently filled or refills available) for medication(s) requested? No    Does the patient have [facility] Plus and need 100 day supply (blood pressure, diabetes and cholesterol meds only)? Medication is not for cholesterol, blood pressure or diabetes

## 2023-09-02 ENCOUNTER — HOSPITAL ENCOUNTER (EMERGENCY)
Facility: MEDICAL CENTER | Age: 58
End: 2023-09-02
Attending: EMERGENCY MEDICINE
Payer: COMMERCIAL

## 2023-09-02 ENCOUNTER — APPOINTMENT (OUTPATIENT)
Dept: RADIOLOGY | Facility: MEDICAL CENTER | Age: 58
End: 2023-09-02
Attending: EMERGENCY MEDICINE
Payer: COMMERCIAL

## 2023-09-02 VITALS
OXYGEN SATURATION: 94 % | RESPIRATION RATE: 18 BRPM | SYSTOLIC BLOOD PRESSURE: 141 MMHG | DIASTOLIC BLOOD PRESSURE: 102 MMHG | WEIGHT: 215 LBS | HEART RATE: 112 BPM | TEMPERATURE: 98 F | BODY MASS INDEX: 35.78 KG/M2

## 2023-09-02 DIAGNOSIS — I48.0 PAROXYSMAL ATRIAL FIBRILLATION (HCC): ICD-10-CM

## 2023-09-02 DIAGNOSIS — I48.19 PERSISTENT ATRIAL FIBRILLATION (HCC): ICD-10-CM

## 2023-09-02 LAB
ALBUMIN SERPL BCP-MCNC: 4.6 G/DL (ref 3.2–4.9)
ALBUMIN/GLOB SERPL: 2 G/DL
ALP SERPL-CCNC: 77 U/L (ref 30–99)
ALT SERPL-CCNC: 17 U/L (ref 2–50)
ANION GAP SERPL CALC-SCNC: 10 MMOL/L (ref 7–16)
APTT PPP: 33.9 SEC (ref 24.7–36)
AST SERPL-CCNC: 19 U/L (ref 12–45)
BASOPHILS # BLD AUTO: 0.4 % (ref 0–1.8)
BASOPHILS # BLD: 0.03 K/UL (ref 0–0.12)
BILIRUB SERPL-MCNC: 0.4 MG/DL (ref 0.1–1.5)
BUN SERPL-MCNC: 11 MG/DL (ref 8–22)
CALCIUM ALBUM COR SERPL-MCNC: 9 MG/DL (ref 8.5–10.5)
CALCIUM SERPL-MCNC: 9.5 MG/DL (ref 8.5–10.5)
CHLORIDE SERPL-SCNC: 105 MMOL/L (ref 96–112)
CO2 SERPL-SCNC: 24 MMOL/L (ref 20–33)
CREAT SERPL-MCNC: 0.74 MG/DL (ref 0.5–1.4)
EKG IMPRESSION: NORMAL
EOSINOPHIL # BLD AUTO: 0.2 K/UL (ref 0–0.51)
EOSINOPHIL NFR BLD: 2.6 % (ref 0–6.9)
ERYTHROCYTE [DISTWIDTH] IN BLOOD BY AUTOMATED COUNT: 38.6 FL (ref 35.9–50)
GFR SERPLBLD CREATININE-BSD FMLA CKD-EPI: 94 ML/MIN/1.73 M 2
GLOBULIN SER CALC-MCNC: 2.3 G/DL (ref 1.9–3.5)
GLUCOSE SERPL-MCNC: 119 MG/DL (ref 65–99)
HCT VFR BLD AUTO: 43.6 % (ref 37–47)
HGB BLD-MCNC: 14.4 G/DL (ref 12–16)
IMM GRANULOCYTES # BLD AUTO: 0.03 K/UL (ref 0–0.11)
IMM GRANULOCYTES NFR BLD AUTO: 0.4 % (ref 0–0.9)
INR PPP: 0.99 (ref 0.87–1.13)
LYMPHOCYTES # BLD AUTO: 0.83 K/UL (ref 1–4.8)
LYMPHOCYTES NFR BLD: 10.9 % (ref 22–41)
MAGNESIUM SERPL-MCNC: 2 MG/DL (ref 1.5–2.5)
MCH RBC QN AUTO: 29.2 PG (ref 27–33)
MCHC RBC AUTO-ENTMCNC: 33 G/DL (ref 32.2–35.5)
MCV RBC AUTO: 88.4 FL (ref 81.4–97.8)
MONOCYTES # BLD AUTO: 0.37 K/UL (ref 0–0.85)
MONOCYTES NFR BLD AUTO: 4.9 % (ref 0–13.4)
NEUTROPHILS # BLD AUTO: 6.13 K/UL (ref 1.82–7.42)
NEUTROPHILS NFR BLD: 80.8 % (ref 44–72)
NRBC # BLD AUTO: 0 K/UL
NRBC BLD-RTO: 0 /100 WBC (ref 0–0.2)
NT-PROBNP SERPL IA-MCNC: 406 PG/ML (ref 0–125)
PHOSPHATE SERPL-MCNC: 2.8 MG/DL (ref 2.5–4.5)
PLATELET # BLD AUTO: 285 K/UL (ref 164–446)
PMV BLD AUTO: 9.4 FL (ref 9–12.9)
POTASSIUM SERPL-SCNC: 3.9 MMOL/L (ref 3.6–5.5)
PROT SERPL-MCNC: 6.9 G/DL (ref 6–8.2)
PROTHROMBIN TIME: 13.2 SEC (ref 12–14.6)
RBC # BLD AUTO: 4.93 M/UL (ref 4.2–5.4)
SODIUM SERPL-SCNC: 139 MMOL/L (ref 135–145)
TROPONIN T SERPL-MCNC: <6 NG/L (ref 6–19)
TSH SERPL DL<=0.005 MIU/L-ACNC: 1.1 UIU/ML (ref 0.38–5.33)
WBC # BLD AUTO: 7.6 K/UL (ref 4.8–10.8)

## 2023-09-02 PROCEDURE — 83880 ASSAY OF NATRIURETIC PEPTIDE: CPT

## 2023-09-02 PROCEDURE — 83735 ASSAY OF MAGNESIUM: CPT

## 2023-09-02 PROCEDURE — 700101 HCHG RX REV CODE 250: Performed by: EMERGENCY MEDICINE

## 2023-09-02 PROCEDURE — 80053 COMPREHEN METABOLIC PANEL: CPT

## 2023-09-02 PROCEDURE — 84100 ASSAY OF PHOSPHORUS: CPT

## 2023-09-02 PROCEDURE — 85025 COMPLETE CBC W/AUTO DIFF WBC: CPT

## 2023-09-02 PROCEDURE — 71045 X-RAY EXAM CHEST 1 VIEW: CPT

## 2023-09-02 PROCEDURE — 700102 HCHG RX REV CODE 250 W/ 637 OVERRIDE(OP): Performed by: EMERGENCY MEDICINE

## 2023-09-02 PROCEDURE — 93005 ELECTROCARDIOGRAM TRACING: CPT | Performed by: EMERGENCY MEDICINE

## 2023-09-02 PROCEDURE — 85730 THROMBOPLASTIN TIME PARTIAL: CPT

## 2023-09-02 PROCEDURE — 93005 ELECTROCARDIOGRAM TRACING: CPT

## 2023-09-02 PROCEDURE — 84443 ASSAY THYROID STIM HORMONE: CPT

## 2023-09-02 PROCEDURE — 84484 ASSAY OF TROPONIN QUANT: CPT

## 2023-09-02 PROCEDURE — 99285 EMERGENCY DEPT VISIT HI MDM: CPT

## 2023-09-02 PROCEDURE — 85610 PROTHROMBIN TIME: CPT

## 2023-09-02 PROCEDURE — 94760 N-INVAS EAR/PLS OXIMETRY 1: CPT

## 2023-09-02 PROCEDURE — A9270 NON-COVERED ITEM OR SERVICE: HCPCS | Performed by: EMERGENCY MEDICINE

## 2023-09-02 PROCEDURE — 96374 THER/PROPH/DIAG INJ IV PUSH: CPT

## 2023-09-02 PROCEDURE — 36415 COLL VENOUS BLD VENIPUNCTURE: CPT

## 2023-09-02 RX ORDER — METOPROLOL TARTRATE 1 MG/ML
5 INJECTION, SOLUTION INTRAVENOUS
Status: COMPLETED | OUTPATIENT
Start: 2023-09-02 | End: 2023-09-02

## 2023-09-02 RX ORDER — METOPROLOL SUCCINATE 25 MG/1
25 TABLET, EXTENDED RELEASE ORAL ONCE
Status: COMPLETED | OUTPATIENT
Start: 2023-09-02 | End: 2023-09-02

## 2023-09-02 RX ORDER — METOPROLOL SUCCINATE 50 MG/1
50 TABLET, EXTENDED RELEASE ORAL DAILY
Qty: 30 TABLET | Refills: 0 | Status: SHIPPED | OUTPATIENT
Start: 2023-09-02 | End: 2023-09-07

## 2023-09-02 RX ADMIN — METOPROLOL TARTRATE 5 MG: 5 INJECTION INTRAVENOUS at 10:42

## 2023-09-02 RX ADMIN — RIVAROXABAN 20 MG: 20 TABLET, FILM COATED ORAL at 11:51

## 2023-09-02 RX ADMIN — METOPROLOL SUCCINATE 25 MG: 25 TABLET, EXTENDED RELEASE ORAL at 11:51

## 2023-09-02 RX ADMIN — METOPROLOL TARTRATE 5 MG: 5 INJECTION INTRAVENOUS at 11:28

## 2023-09-02 RX ADMIN — METOPROLOL TARTRATE 5 MG: 5 INJECTION INTRAVENOUS at 10:58

## 2023-09-02 ASSESSMENT — LIFESTYLE VARIABLES
EVER FELT BAD OR GUILTY ABOUT YOUR DRINKING: NO
CONSUMPTION TOTAL: INCOMPLETE
DO YOU DRINK ALCOHOL: YES
TOTAL SCORE: 0
TOTAL SCORE: 0
EVER HAD A DRINK FIRST THING IN THE MORNING TO STEADY YOUR NERVES TO GET RID OF A HANGOVER: NO
HAVE YOU EVER FELT YOU SHOULD CUT DOWN ON YOUR DRINKING: NO
HAVE PEOPLE ANNOYED YOU BY CRITICIZING YOUR DRINKING: NO
TOTAL SCORE: 0

## 2023-09-02 NOTE — ED PROVIDER NOTES
"ED Provider Note    CHIEF COMPLAINT  Chief Complaint   Patient presents with    Shortness of Breath    Palpitations     Woke up with irregular heartbeat.    Irregular Heart Beat       EXTERNAL RECORDS REVIEWED  Outpatient Notes on August 30, 2022 the patient had an echocardiogram that showed a normal EF of 60%    HPI/ROS  LIMITATION TO HISTORY   Select: : None  OUTSIDE HISTORIAN(S):  None    Vicky Nino is a 58 y.o. female who presents with past medical history significant for paroxysmal A-fib, hypertension, deviated septum, she reports that she has not been feeling great, she describes it as feeling \"wonky\" for the last week.  She denies any chest pain but says that today she noticed that her heart rate was 120.  She is not sure if she checked her heart rate yesterday.  She also says she feels short of breath but she thinks that partially due to her deviated septum.  She reports that last year she was found to be in A-fib but then on follow-up she was not and initially was started on Xarelto but was told to stop her Xarelto.      PAST MEDICAL HISTORY   has a past medical history of Arrhythmia, Asthma, Atrial fibrillation (HCC), Chest discomfort (11/18/2013), Chickenpox, Cough, Depression, High cholesterol, History of anemia, Hypertension, Obesity, Palpitations (11/18/2013), Psychiatric disorder, Rhinitis, Snoring, Ventricular bigeminy (11/18/2013), Wears glasses, and Weight loss.    SURGICAL HISTORY   has a past surgical history that includes colonoscopy; knee arthroscopy (Right, 06/03/2019); meniscectomy, knee, medial (Right, 06/03/2019); chondroplasty (Right, 06/03/2019); and arthroscopy, knee.    FAMILY HISTORY  Family History   Problem Relation Age of Onset    Diabetes Mother     Genitourinary () Problems Mother         renal failure, kidey transplant    Hypertension Mother     Heart Disease Mother     Kidney Disease Mother     Thyroid Sister         2 with removal due to Graves    Hypertension Brother  "    Cancer Brother 60        colon cancer     Colon Cancer Brother     Cancer Paternal Grandmother         Breast cancer    Cancer Paternal Grandfather         lung    Cancer Maternal Aunt     Hypertension Father     Cancer Maternal Grandmother         Skin Cancer    Cancer Maternal Aunt         Breast cancer    Sleep Apnea Sister        SOCIAL HISTORY  Social History     Tobacco Use    Smoking status: Never    Smokeless tobacco: Never   Vaping Use    Vaping Use: Never used   Substance and Sexual Activity    Alcohol use: Not Currently     Comment: rarely drink    Drug use: Never    Sexual activity: Not Currently     Partners: Male       CURRENT MEDICATIONS  Home Medications       Reviewed by Brittney Bolton R.N. (Registered Nurse) on 09/02/23 at 0931  Med List Status: Partial     Medication Last Dose Status   acyclovir (ZOVIRAX) 800 MG Tab  Active   albuterol 108 (90 Base) MCG/ACT Aero Soln inhalation aerosol  Active   amLODIPine (NORVASC) 5 MG Tab  Active   Cholecalciferol (VITAMIN D) 1000 UNIT CAPS  Active   COLLAGEN PO  Active   fluticasone (FLONASE) 50 MCG/ACT nasal spray  Active   fluticasone-salmeterol (ADVAIR) 250-50 MCG/ACT AEROSOL POWDER, BREATH ACTIVATED  Active   metoprolol SR (TOPROL XL) 25 MG TABLET SR 24 HR  Active   multivitamin (THERAGRAN) Tab  Active   omeprazole (PRILOSEC) 20 MG delayed-release capsule  Active                    ALLERGIES  No Known Allergies    PHYSICAL EXAM  VITAL SIGNS: BP (!) 147/83   Pulse (!) 121   Temp 36.4 °C (97.6 °F) (Temporal)   Resp (!) 21   Wt 97.5 kg (215 lb)   LMP 09/18/2013   SpO2 94%   BMI 35.78 kg/m²    Constitutional: Alert.  HENT: No signs of trauma, Bilateral external ears normal, Nose normal.   Eyes: Pupils are equal and reactive, Conjunctiva normal, Non-icteric.   Neck: Normal range of motion, No tenderness, Supple, No stridor.   Lymphatic: No lymphadenopathy noted.   Cardiovascular: Irregularly irregular with no murmurs.  Tachycardic.  Thorax &  Lungs: Normal breath sounds, No respiratory distress, No wheezing, No chest tenderness.   Abdomen: Bowel sounds normal, Soft, No tenderness, No peritoneal signs, No masses, No pulsatile masses.   Skin: Warm, Dry, No erythema, No rash.   Extremities: Intact distal pulses, No edema, No tenderness, No cyanosis.  Musculoskeletal: Good range of motion in all major joints. No tenderness to palpation or major deformities noted.   Neurologic: Alert , Normal motor function, Normal sensory function, No focal deficits noted.   Psychiatric: Affect normal, Judgment normal, Mood normal.         DIAGNOSTIC STUDIES / PROCEDURES  EKG  I have independently interpreted this EKG  Atrial fibrillation with RVR rate 125  Inferior infarct, old  Axis LAD  QRS and QT intervals normal  Compared to ECG 10/25/2022 10:46:25  Myocardial infarct finding now present  Sinus rhythm no longer present  My impression of this EKG Afib with RVR doees not meet STEMI criteria at this time    LABS  Labs Reviewed   CBC WITH DIFFERENTIAL - Abnormal; Notable for the following components:       Result Value    Neutrophils-Polys 80.80 (*)     Lymphocytes 10.90 (*)     Lymphs (Absolute) 0.83 (*)     All other components within normal limits    Narrative:     Biotin intake of greater than 5 mg per day may interfere with  troponin levels, causing false low values.  Indicate which anticoagulants the patient is on:->UNKNOWN   COMP METABOLIC PANEL - Abnormal; Notable for the following components:    Glucose 119 (*)     All other components within normal limits    Narrative:     Biotin intake of greater than 5 mg per day may interfere with  troponin levels, causing false low values.  Indicate which anticoagulants the patient is on:->UNKNOWN   PROBRAIN NATRIURETIC PEPTIDE, NT - Abnormal; Notable for the following components:    NT-proBNP 406 (*)     All other components within normal limits    Narrative:     Biotin intake of greater than 5 mg per day may interfere  with  troponin levels, causing false low values.  Indicate which anticoagulants the patient is on:->UNKNOWN   TROPONIN    Narrative:     Biotin intake of greater than 5 mg per day may interfere with  troponin levels, causing false low values.  Indicate which anticoagulants the patient is on:->UNKNOWN   MAGNESIUM    Narrative:     Biotin intake of greater than 5 mg per day may interfere with  troponin levels, causing false low values.  Indicate which anticoagulants the patient is on:->UNKNOWN   PHOSPHORUS    Narrative:     Biotin intake of greater than 5 mg per day may interfere with  troponin levels, causing false low values.  Indicate which anticoagulants the patient is on:->UNKNOWN   PROTHROMBIN TIME    Narrative:     Biotin intake of greater than 5 mg per day may interfere with  troponin levels, causing false low values.  Indicate which anticoagulants the patient is on:->UNKNOWN   APTT    Narrative:     Biotin intake of greater than 5 mg per day may interfere with  troponin levels, causing false low values.  Indicate which anticoagulants the patient is on:->UNKNOWN   TSH    Narrative:     Biotin intake of greater than 5 mg per day may interfere with  troponin levels, causing false low values.  Indicate which anticoagulants the patient is on:->UNKNOWN   ESTIMATED GFR    Narrative:     Biotin intake of greater than 5 mg per day may interfere with  troponin levels, causing false low values.  Indicate which anticoagulants the patient is on:->UNKNOWN         RADIOLOGY  I have independently interpreted the diagnostic imaging associated with this visit and am waiting the final reading from the radiologist.   My preliminary interpretation is as follows: Negative chest x-ray  Radiologist interpretation:     DX-CHEST-PORTABLE (1 VIEW)   Final Result      No acute process.            COURSE & MEDICAL DECISION MAKING    ED Observation Status? Yes; I am placing the patient in to an observation status due to a diagnostic  uncertainty as well as therapeutic intensity. Patient placed in observation status at 10:02 AM, 9/2/2023.     Observation plan is as follows: The patient presents with A-fib, it is not clear to me when the symptoms started.  I have ordered labs to evaluate for electrolyte abnormality, chest x-ray, I have ordered beta-blocker for rate control.    Upon Reevaluation, the patient's condition has: Improved; and will be discharged.    Patient discharged from ED Observation status at 11:44 AM (Time) September 2, 2023 (Date).     INITIAL ASSESSMENT, COURSE AND PLAN  Care Narrative:     The patient's labs are unremarkable, chest x-ray is negative.  She has received a total of 15 mg IV Lopressor with improvement in her ventricular rate.          ADDITIONAL PROBLEM LIST  Anxiety, hypertension, paroxysmal A-fib  DISPOSITION AND DISCUSSIONS  I have discussed management of the patient with the following physicians and JACKELYN's: I spoke with Dr. Castellanos the on-call cardiologist, we agree with the plan to have the patient double her metoprolol XL to 50 mg p.o. daily, the patient has had A-fib previously and has Xarelto 20 mg tablets at home that she will begin taking today.  Here she was given an extra 25 mg p.o. metoprolol XL and Xarelto 20 mg p.o.  She will follow-up with the department of cardiology.  She will return if she has worsening symptoms or is too uncomfortable.    Discussion of management with other [facility] or appropriate source(s): Pharmacy confirm dose of Xarelto for A-fib      Escalation of care considered, and ultimately not performed:acute inpatient care management, however at this time, the patient is most appropriate for outpatient management    Barriers to care at this time, including but not limited to:  None .     Decision tools and prescription drugs considered including, but not limited to:  Medication modification, increase, doubling of her beta-blocker and having the patient's start Xarelto .      CRITICAL  CARE  The very real possibilty of a deterioration of this patient's condition required the highest level of my preparedness for sudden, emergent intervention.  I provided critical care services, which included medication orders, frequent reevaluations of the patient's condition and response to treatment, ordering and reviewing test results, and discussing the case with various consultants.  The critical care time associated with the care of the patient was 40 minutes. Review chart for interventions. This time is exclusive of any other billable procedures.      The patient will return for new or worsening symptoms and is stable at the time of discharge.    The patient is referred to a primary physician for blood pressure management, diabetic screening, and for all other preventative health concerns.        DISPOSITION:  Patient will be discharged home in stable condition.    FOLLOW UP:  [facility] Emergency Dept  [address]  [place] [address] [phone]  Follow up  If symptoms worsen    [facility]  [address]  [place] [address] [phone]  Follow up  call for followup      OUTPATIENT MEDICATIONS:  New Prescriptions    METOPROLOL SR (TOPROL XL) 50 MG TABLET SR 24 HR    Take 1 Tablet by mouth every day.         FINAL DIAGNOSIS  1. Paroxysmal atrial fibrillation (HCC)    2. Persistent atrial fibrillation (HCC)          Total critical care time 40 minutes as outlined above       Electronically signed by: Tru Ceballos M.D., 9/2/2023 10:02 AM

## 2023-09-02 NOTE — ED TRIAGE NOTES
.  Chief Complaint   Patient presents with    Shortness of Breath    Palpitations     Woke up with irregular heartbeat.    Irregular Heart Beat     Ambulated to triage with above cc. Pt taken back for EKG

## 2023-09-07 ENCOUNTER — OFFICE VISIT (OUTPATIENT)
Dept: CARDIOLOGY | Facility: MEDICAL CENTER | Age: 58
End: 2023-09-07
Payer: COMMERCIAL

## 2023-09-07 VITALS
HEIGHT: 65 IN | RESPIRATION RATE: 16 BRPM | HEART RATE: 58 BPM | OXYGEN SATURATION: 99 % | DIASTOLIC BLOOD PRESSURE: 82 MMHG | SYSTOLIC BLOOD PRESSURE: 138 MMHG | BODY MASS INDEX: 36.65 KG/M2 | WEIGHT: 220 LBS

## 2023-09-07 DIAGNOSIS — I48.0 PAROXYSMAL ATRIAL FIBRILLATION (HCC): ICD-10-CM

## 2023-09-07 DIAGNOSIS — I10 ESSENTIAL HYPERTENSION: ICD-10-CM

## 2023-09-07 DIAGNOSIS — E78.5 HYPERLIPIDEMIA, UNSPECIFIED HYPERLIPIDEMIA TYPE: ICD-10-CM

## 2023-09-07 DIAGNOSIS — I48.19 PERSISTENT ATRIAL FIBRILLATION (HCC): ICD-10-CM

## 2023-09-07 PROCEDURE — 99212 OFFICE O/P EST SF 10 MIN: CPT

## 2023-09-07 PROCEDURE — 99214 OFFICE O/P EST MOD 30 MIN: CPT

## 2023-09-07 PROCEDURE — 3079F DIAST BP 80-89 MM HG: CPT

## 2023-09-07 PROCEDURE — 3075F SYST BP GE 130 - 139MM HG: CPT

## 2023-09-07 RX ORDER — METOPROLOL SUCCINATE 25 MG/1
25 TABLET, EXTENDED RELEASE ORAL DAILY
Qty: 100 TABLET | Refills: 3 | Status: SHIPPED | OUTPATIENT
Start: 2023-09-07 | End: 2024-02-20 | Stop reason: SDUPTHER

## 2023-09-07 ASSESSMENT — ENCOUNTER SYMPTOMS
NEUROLOGICAL NEGATIVE: 1
PALPITATIONS: 0
MUSCULOSKELETAL NEGATIVE: 1
GASTROINTESTINAL NEGATIVE: 1
CONSTITUTIONAL NEGATIVE: 1
SHORTNESS OF BREATH: 1
NERVOUS/ANXIOUS: 0
DEPRESSION: 0
PND: 0
EYES NEGATIVE: 1
ORTHOPNEA: 0

## 2023-09-07 ASSESSMENT — FIBROSIS 4 INDEX: FIB4 SCORE: 0.92

## 2023-09-07 NOTE — PROGRESS NOTES
Chief Complaint   Patient presents with    Atrial Fibrillation    Hypertension    Hyperlipidemia       Subjective     Vicky Nino is a 58 y.o. female who presents today for follow up of her A-fib.    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 Galvan on 10/25/2022 given that she had a low [id number] at that visit she was recommended to stop DOAC, also recommended follow-up with sleep medicine for positive OPO, the possibility of ablation was discussed with her if recurrence of A-fib.    She presented to the ER on 9/2/2023 for palpitations and shortness of breath, was found to be in A-fib, had her metoprolol dose increased and was restarted on Xarelto    Today 9/7/2023 she has had some shortness of breath which was improved with her inhaler and allergy medicine, she believes it was due to allergy/asthma    Activity: she goes to [employer], has not gone since ER visit, also has been lifting weights    No symptoms of chest pain, palpitations, or lower extremity edema.      Past Medical History:   Diagnosis Date    Arrhythmia     rapid heart rate     Asthma     has not used inhaler in a year    Atrial fibrillation (HCC)     Chest discomfort 11/18/2013    Chickenpox     Cough     Depression     High cholesterol     History of anemia     Hypertension     Obesity     Palpitations 11/18/2013    Psychiatric disorder     anxiety    Rhinitis     Snoring     Ventricular bigeminy 11/18/2013    Wears glasses     Weight loss      Past Surgical History:   Procedure Laterality Date    KNEE ARTHROSCOPY Right 06/03/2019    Procedure: ARTHROSCOPY, KNEE;  Surgeon: Jag Gomez M.D.;  Location: SURGERY [facility];  Service: Orthopedics    MENISCECTOMY, KNEE, MEDIAL Right 06/03/2019    Procedure: MENISCECTOMY, KNEE, MEDIAL, LATERAL-  PARTIAL;  Surgeon: Jag Gomez M.D.;  [facility];  Service: Orthopedics    CHONDROPLASTY Right 06/03/2019    Procedure: CHONDROPLASTY-  patella;  Surgeon: Jag Gomez M.D.;  Location: SURGERY [facility];  Service: Orthopedics    ARTHROSCOPY, KNEE      COLONOSCOPY       Family History   Problem Relation Age of Onset    Diabetes Mother     Genitourinary () Problems Mother         renal failure, kidey transplant    Hypertension Mother     Heart Disease Mother     Kidney Disease Mother     Thyroid Sister         2 with removal due to Graves    Hypertension Brother     Cancer Brother 60        colon cancer     Colon Cancer Brother     Cancer Paternal Grandmother         Breast cancer    Cancer Paternal Grandfather         lung    Cancer Maternal Aunt     Hypertension Father     Cancer Maternal Grandmother         Skin Cancer    Cancer Maternal Aunt         Breast cancer    Sleep Apnea Sister      Social History     Socioeconomic History    Marital status:      Spouse name: Not on file    Number of children: Not on file    Years of education: Not on file    Highest education level: Not on file   Occupational History    Not on file   Tobacco Use    Smoking status: Never    Smokeless tobacco: Never   Vaping Use    Vaping Use: Never used   Substance and Sexual Activity    Alcohol use: Not Currently     Comment: rarely drink    Drug use: Never    Sexual activity: Not Currently     Partners: Male   Other Topics Concern    Not on file   Social History Narrative    Not on file     Social Determinants of Health     Financial Resource Strain: Not on file   Food Insecurity: Not on file   Transportation Needs: Not on file   Physical Activity: Not on file   Stress: Not on file   Social Connections: Not on file   Intimate Partner Violence: Not on file   Housing Stability: Not on file     No Known Allergies  Outpatient Encounter Medications as of 9/7/2023   Medication Sig Dispense Refill    metoprolol SR (TOPROL XL) 50 MG TABLET SR 24 HR Take 1 Tablet by mouth every day. 30 Tablet 0    amLODIPine (NORVASC) 5 MG Tab TAKE 1 TABLET BY MOUTH EVERY DAY 90  "Tablet 0    omeprazole (PRILOSEC) 20 MG delayed-release capsule Take 20 mg by mouth every day.      fluticasone-salmeterol (ADVAIR) 250-50 MCG/ACT AEROSOL POWDER, BREATH ACTIVATED Inhale 1 Puff every 12 hours. 60 Each 2    acyclovir (ZOVIRAX) 800 MG Tab Take 1 Tablet by mouth 3 times a day. TAKE 1 TABLET ORALLY 3 TIMES A DAY 30 Tablet 3    albuterol 108 (90 Base) MCG/ACT Aero Soln inhalation aerosol INHALE 2 PUFFS BY MOUTH EVERY 6 HOURS AS NEEDED FOR SHORTNESS OF BREATH 8.5 Inhaler 1    fluticasone (FLONASE) 50 MCG/ACT nasal spray Spray 2 Sprays in nose every day. 1 Bottle 11    multivitamin (THERAGRAN) Tab Take 1 Tab by mouth every day.      COLLAGEN PO Take  by mouth every day. 1/2 scoop      Cholecalciferol (VITAMIN D) 1000 UNIT CAPS Take 1 Cap by mouth every day.       No facility-administered encounter medications on file as of 9/7/2023.     Review of Systems   Constitutional: Negative.    HENT: Negative.     Eyes: Negative.    Respiratory:  Positive for shortness of breath.    Cardiovascular:  Negative for chest pain, palpitations, orthopnea, leg swelling and PND.   Gastrointestinal: Negative.    Genitourinary: Negative.    Musculoskeletal: Negative.    Skin: Negative.    Neurological: Negative.    Endo/Heme/Allergies: Negative.    Psychiatric/Behavioral:  Negative for depression. The patient is not nervous/anxious.               Objective     /82 (BP Location: Left arm, Patient Position: Sitting, BP Cuff Size: Adult)   Pulse (!) 58   Resp 16   Ht 1.651 m (5' 5\")   Wt 99.8 kg (220 lb)   LMP 09/18/2013   SpO2 99%   BMI 36.61 kg/m²     Physical Exam  Constitutional:       Appearance: Normal appearance. She is obese.   HENT:      Head: Normocephalic and atraumatic.   Neck:      Vascular: No JVD.   Cardiovascular:      Rate and Rhythm: Normal rate and regular rhythm.      Pulses: Normal pulses.      Heart sounds: Normal heart sounds. No murmur heard.     No friction rub.   Pulmonary:      Effort: " Pulmonary effort is normal. No respiratory distress.      Breath sounds: Normal breath sounds.   Abdominal:      General: There is no distension.      Palpations: Abdomen is soft.   Musculoskeletal:      Right lower leg: No edema.      Left lower leg: No edema.   Skin:     General: Skin is warm and dry.   Neurological:      General: No focal deficit present.      Mental Status: She is alert and oriented to person, place, and time.   Psychiatric:         Mood and Affect: Mood normal.         Behavior: Behavior normal.            Lab Results   Component Value Date/Time    CHOLSTRLTOT 172 08/18/2022 10:33 AM    LDL 93 08/18/2022 10:33 AM    HDL 54 08/18/2022 10:33 AM    TRIGLYCERIDE 127 08/18/2022 10:33 AM       Lab Results   Component Value Date/Time    SODIUM 139 09/02/2023 10:08 AM    POTASSIUM 3.9 09/02/2023 10:08 AM    CHLORIDE 105 09/02/2023 10:08 AM    CO2 24 09/02/2023 10:08 AM    GLUCOSE 119 (H) 09/02/2023 10:08 AM    BUN 11 09/02/2023 10:08 AM    CREATININE 0.74 09/02/2023 10:08 AM    CREATININE 0.69 04/12/2010 10:45 AM    BUNCREATRAT 13 04/12/2010 10:45 AM    GLOMRATE >59 04/12/2010 10:45 AM     Lab Results   Component Value Date/Time    ALKPHOSPHAT 77 09/02/2023 10:08 AM    ASTSGOT 19 09/02/2023 10:08 AM    ALTSGPT 17 09/02/2023 10:08 AM    TBILIRUBIN 0.4 09/02/2023 10:08 AM          Assessment & Plan     1. Paroxysmal atrial fibrillation (HCC)        2. Essential hypertension        3. Hyperlipidemia, unspecified hyperlipidemia type            Medical Decision Making: Today's Assessment/Status/Plan:        Atrial fibrillation  -Type: paroxysmal  -Bradycardic in the office today  -[id number] Score (CHF/CM, HTN, Age, DM, CVA, Vascular disease, Gender): 2 for gender and hypertension  -Medications: Reduce dose of metoprolol to 25 mg daily, she can take additional 25 mg tablet if heart rate sustained greater than 110  -Anticoagulation: Xarelto  -Referral to EP for consideration of  ablation    Hypertension  -Elevated in the office today  -Letter control at home  - continue metoprolol and amlodipine  - consider up titration of amlodipine at next visit if BP not at goal  - goal < 130/80  - check BP daily 2 hours after taking medication and keep log of measurements   - check in in 2 weeks with home blood pressure readings    Hyperlipidemia  -Most recent LDL 93  -On no medication, check lipid panel annually    Follow-up with EP, Dr. Galvan    This note was dictated using Dragon speech recognition software.

## 2023-09-18 ENCOUNTER — PATIENT MESSAGE (OUTPATIENT)
Dept: CARDIOLOGY | Facility: MEDICAL CENTER | Age: 58
End: 2023-09-18
Payer: COMMERCIAL

## 2023-09-25 ENCOUNTER — TELEPHONE (OUTPATIENT)
Dept: CARDIOLOGY | Facility: MEDICAL CENTER | Age: 58
End: 2023-09-25
Payer: COMMERCIAL

## 2023-09-25 NOTE — TELEPHONE ENCOUNTER
Juni Galvan M.D.  You 7 minutes ago (4:05 PM)     Last time I saw her I recommended stopping it, not sure why she is back on it, I'm fine either way but can discuss further in clinic re risks and benefits.      Phone Number Called: [phone]    Call outcome: Spoke to patient regarding message below.    Message: Called to inform patient about MC recommendations. At this time, patient would like to stop Xarelto and discuss further at appointment with MC.

## 2023-09-25 NOTE — TELEPHONE ENCOUNTER
----- Message from GREGORY Calloway sent at 9/25/2023 12:42 PM PDT -----  Can you please check on her home BP? she hasnt responded to my message     none

## 2023-09-25 NOTE — TELEPHONE ENCOUNTER
Phone Number Called: [phone]    Call outcome: Spoke to patient regarding message below.    Message: Patient reports that she has not been taking her blood pressure, but that she will start taking it and report back with findings. Advised to take for one week and provide readings following the week. Patient wondering if she should continue to take her Xarelto at this time or if she can stop taking it. LH advised to continue taking until her appointment with Dr. Galvan, but patient would like to hear what Dr. Galvan advises. Per patient, she does not feel that she is still in afib at this time.     To MC: please advise on Xarelto as patient would like your opinion; thank you!

## 2023-10-23 ENCOUNTER — OFFICE VISIT (OUTPATIENT)
Dept: CARDIOLOGY | Facility: MEDICAL CENTER | Age: 58
End: 2023-10-23
Payer: COMMERCIAL

## 2023-10-23 VITALS
BODY MASS INDEX: 37.49 KG/M2 | OXYGEN SATURATION: 99 % | DIASTOLIC BLOOD PRESSURE: 82 MMHG | HEART RATE: 67 BPM | RESPIRATION RATE: 16 BRPM | WEIGHT: 225 LBS | HEIGHT: 65 IN | SYSTOLIC BLOOD PRESSURE: 120 MMHG

## 2023-10-23 DIAGNOSIS — I48.0 PAROXYSMAL ATRIAL FIBRILLATION (HCC): ICD-10-CM

## 2023-10-23 PROCEDURE — 93010 ELECTROCARDIOGRAM REPORT: CPT | Performed by: INTERNAL MEDICINE

## 2023-10-23 PROCEDURE — 3074F SYST BP LT 130 MM HG: CPT | Performed by: INTERNAL MEDICINE

## 2023-10-23 PROCEDURE — 93005 ELECTROCARDIOGRAM TRACING: CPT | Performed by: INTERNAL MEDICINE

## 2023-10-23 PROCEDURE — 99212 OFFICE O/P EST SF 10 MIN: CPT | Performed by: INTERNAL MEDICINE

## 2023-10-23 PROCEDURE — 99215 OFFICE O/P EST HI 40 MIN: CPT | Mod: 25 | Performed by: INTERNAL MEDICINE

## 2023-10-23 PROCEDURE — 3079F DIAST BP 80-89 MM HG: CPT | Performed by: INTERNAL MEDICINE

## 2023-10-23 ASSESSMENT — FIBROSIS 4 INDEX: FIB4 SCORE: 0.92

## 2023-10-23 NOTE — PROGRESS NOTES
Arrhythmia Clinic Note (Established patient)    DOS: 10/23/2023    Chief complaint/Reason for consult: Afib    Interval History: 57 y/o F with isolated episode of pAF last year. Had another episode last month due to high stress levels. Went to ER, had more metoprolol given, Afib resolved after discharge without DCCV.    ROS (+ highlighted in bold):  Constitutional: Fevers/chills/fatigue/weightloss  HEENT: Blurry vision/eye pain/sore throat/hearing loss  Respiratory: Shortness of breath/cough  Cardiovascular: Chest pain/palpitations/edema/orthopnea/syncope  GI: Nausea/vomitting/diarrhea  MSK: Arthralgias/myagias/muscle weakness  Skin: Rash/sores  Neurological: Numbness/tremors/vertigo  Endocrine: Excessive thirst/polyuria/cold intolerance/heat intolerance  Psych: Depression/anxiety    Past Medical History:   Diagnosis Date    Arrhythmia     rapid heart rate     Asthma     has not used inhaler in a year    Atrial fibrillation (HCC)     Chest discomfort 11/18/2013    Chickenpox     Cough     Depression     High cholesterol     History of anemia     Hypertension     Obesity     Palpitations 11/18/2013    Psychiatric disorder     anxiety    Rhinitis     Snoring     Ventricular bigeminy 11/18/2013    Wears glasses     Weight loss        Past Surgical History:   Procedure Laterality Date    KNEE ARTHROSCOPY Right 06/03/2019    Procedure: ARTHROSCOPY, KNEE;  Surgeon: Jag Gomez M.D.;  Location: [facility];  Service: Orthopedics    MENISCECTOMY, KNEE, MEDIAL Right 06/03/2019    Procedure: MENISCECTOMY, KNEE, MEDIAL, LATERAL-  PARTIAL;  Surgeon: Jag Gomez M.D.;  Location: [facility];  Service: Orthopedics    CHONDROPLASTY Right 06/03/2019    Procedure: CHONDROPLASTY- patella;  Surgeon: Jag Gomez M.D.;  Location: [facility];  Service: Orthopedics    ARTHROSCOPY, KNEE      COLONOSCOPY         Social History     Socioeconomic History    Marital status:      Spouse  name: Not on file    Number of children: Not on file    Years of education: Not on file    Highest education level: Not on file   Occupational History    Not on file   Tobacco Use    Smoking status: Never    Smokeless tobacco: Never   Vaping Use    Vaping Use: Never used   Substance and Sexual Activity    Alcohol use: Not Currently     Comment: rarely drink    Drug use: Never    Sexual activity: Not Currently     Partners: Male   Other Topics Concern    Not on file   Social History Narrative    Not on file     Social Determinants of Health     Financial Resource Strain: Not on file   Food Insecurity: Not on file   Transportation Needs: Not on file   Physical Activity: Not on file   Stress: Not on file   Social Connections: Not on file   Intimate Partner Violence: Not on file   Housing Stability: Not on file       Family History   Problem Relation Age of Onset    Diabetes Mother     Genitourinary () Problems Mother         renal failure, kidey transplant    Hypertension Mother     Heart Disease Mother     Kidney Disease Mother     Thyroid Sister         2 with removal due to Graves    Hypertension Brother     Cancer Brother 60        colon cancer     Colon Cancer Brother     Cancer Paternal Grandmother         Breast cancer    Cancer Paternal Grandfather         lung    Cancer Maternal Aunt     Hypertension Father     Cancer Maternal Grandmother         Skin Cancer    Cancer Maternal Aunt         Breast cancer    Sleep Apnea Sister        No Known Allergies    Current Outpatient Medications   Medication Sig Dispense Refill    metoprolol SR (TOPROL XL) 25 MG TABLET SR 24 HR Take 1 Tablet by mouth every day. 100 Tablet 3    amLODIPine (NORVASC) 5 MG Tab TAKE 1 TABLET BY MOUTH EVERY DAY 90 Tablet 0    omeprazole (PRILOSEC) 20 MG delayed-release capsule Take 20 mg by mouth every day.      acyclovir (ZOVIRAX) 800 MG Tab Take 1 Tablet by mouth 3 times a day. TAKE 1 TABLET ORALLY 3 TIMES A DAY 30 Tablet 3    albuterol 108  "(90 Base) MCG/ACT Aero Soln inhalation aerosol INHALE 2 PUFFS BY MOUTH EVERY 6 HOURS AS NEEDED FOR SHORTNESS OF BREATH 8.5 Inhaler 1    fluticasone (FLONASE) 50 MCG/ACT nasal spray Spray 2 Sprays in nose every day. 1 Bottle 11    multivitamin (THERAGRAN) Tab Take 1 Tab by mouth every day.      COLLAGEN PO Take  by mouth every day. 1/2 scoop      Cholecalciferol (VITAMIN D) 1000 UNIT CAPS Take 1 Cap by mouth every day.      fluticasone-salmeterol (ADVAIR) 250-50 MCG/ACT AEROSOL POWDER, BREATH ACTIVATED Inhale 1 Puff every 12 hours. (Patient not taking: Reported on 10/23/2023) 60 Each 2     No current facility-administered medications for this visit.       Physical Exam:  Vitals:    10/23/23 1500   BP: 120/82   BP Location: Left arm   Patient Position: Sitting   BP Cuff Size: Adult   Pulse: 67   Resp: 16   SpO2: 99%   Weight: 102 kg (225 lb)   Height: 1.651 m (5' 5\")     General appearance: NAD, conversant   Eyes: anicteric sclerae, moist conjunctivae; no lid-lag; PERRLA  HENT: Atraumatic; oropharynx clear with moist mucous membranes and no mucosal ulcerations; normal hard and soft palate  Neck: Trachea midline; FROM, supple, no thyromegaly or lymphadenopathy  Lungs: CTA, with normal respiratory effort and no intercostal retractions  CV: RRR, no MRGs, no JVD  Abdomen: Soft, non-tender; no masses or HSM  Extremities: No peripheral edema or extremity lymphadenopathy  Skin: Normal temperature, turgor and texture; no rash, ulcers or subcutaneous nodules  Psych: Appropriate affect, alert and oriented to person, place and time    Data:  Lipids:   Lab Results   Component Value Date/Time    CHOLSTRLTOT 172 08/18/2022 10:33 AM    TRIGLYCERIDE 127 08/18/2022 10:33 AM    HDL 54 08/18/2022 10:33 AM    LDL 93 08/18/2022 10:33 AM        BMP:  Lab Results   Component Value Date/Time    SODIUM 139 09/02/2023 1008    POTASSIUM 3.9 09/02/2023 1008    CHLORIDE 105 09/02/2023 1008    CO2 24 09/02/2023 1008    GLUCOSE 119 (H) 09/02/2023 " "1008    BUN 11 09/02/2023 1008    CREATININE 0.74 09/02/2023 1008    CALCIUM 9.5 09/02/2023 1008    ANION 10.0 09/02/2023 1008        TSH:   Lab Results   Component Value Date/Time    TSHULTRASEN 1.100 09/02/2023 1008        THYROXINE (T4):   No results found for: \"FREEDIR\"     CBC:   Lab Results   Component Value Date/Time    WBC 7.6 09/02/2023 10:08 AM    RBC 4.93 09/02/2023 10:08 AM    HEMOGLOBIN 14.4 09/02/2023 10:08 AM    HEMATOCRIT 43.6 09/02/2023 10:08 AM    MCV 88.4 09/02/2023 10:08 AM    MCH 29.2 09/02/2023 10:08 AM    MCHC 33.0 09/02/2023 10:08 AM    RDW 38.6 09/02/2023 10:08 AM    PLATELETCT 285 09/02/2023 10:08 AM    MPV 9.4 09/02/2023 10:08 AM    NEUTSPOLYS 80.80 (H) 09/02/2023 10:08 AM    LYMPHOCYTES 10.90 (L) 09/02/2023 10:08 AM    MONOCYTES 4.90 09/02/2023 10:08 AM    EOSINOPHILS 2.60 09/02/2023 10:08 AM    BASOPHILS 0.40 09/02/2023 10:08 AM    IMMGRAN 0.40 09/02/2023 10:08 AM    NRBC 0.00 09/02/2023 10:08 AM    NEUTS 6.13 09/02/2023 10:08 AM    LYMPHS 0.83 (L) 09/02/2023 10:08 AM    MONOS 0.37 09/02/2023 10:08 AM    EOS 0.20 09/02/2023 10:08 AM    BASO 0.03 09/02/2023 10:08 AM    IMMGRANAB 0.03 09/02/2023 10:08 AM    NRBCAB 0.00 09/02/2023 10:08 AM        CBC w/o DIFF  Lab Results   Component Value Date/Time    WBC 7.6 09/02/2023 10:08 AM    RBC 4.93 09/02/2023 10:08 AM    HEMOGLOBIN 14.4 09/02/2023 10:08 AM    MCV 88.4 09/02/2023 10:08 AM    MCH 29.2 09/02/2023 10:08 AM    MCHC 33.0 09/02/2023 10:08 AM    RDW 38.6 09/02/2023 10:08 AM    MPV 9.4 09/02/2023 10:08 AM       Prior echo/stress reviewed: Normal EF      EKG interpreted by me: NSR    Impression/Plan:  1. Paroxysmal atrial fibrillation (HCC)  EKG        pAF    - Discussed at length. AAD and ablation discussed however potential reversible causes including high stress, weight, and untreated PHIL. Encouraged lifestyle modification.  - If progressive afib, reasonable to proceed to ablation, for now will wait  - Low risk for surgery    Annual " FV    A total of 42 minutes of time was spent on day of encounter reviewing medical record, performing history and examination, counseling, ordering medication/test/consults, collaborating with referring service, and documentation.      Juni Galvan MD  Cardiac Electrophysiology

## 2023-11-16 LAB — EKG IMPRESSION: NORMAL

## 2023-12-07 DIAGNOSIS — I10 ESSENTIAL HYPERTENSION: ICD-10-CM

## 2023-12-07 RX ORDER — AMLODIPINE BESYLATE 5 MG/1
TABLET ORAL
Qty: 90 TABLET | Refills: 0 | Status: SHIPPED | OUTPATIENT
Start: 2023-12-07 | End: 2024-01-09 | Stop reason: SDUPTHER

## 2023-12-07 NOTE — TELEPHONE ENCOUNTER
Received request via: Pharmacy  7/6/23lov  Was the patient seen in the last year in this department? Yes    Does the patient have an active prescription (recently filled or refills available) for medication(s) requested? No    Does the patient have skilled nursing Plus and need 100 day supply (blood pressure, diabetes and cholesterol meds only)? Patient does not have SCP

## 2023-12-14 DIAGNOSIS — B00.9 HSV INFECTION: ICD-10-CM

## 2023-12-14 RX ORDER — ACYCLOVIR 800 MG/1
800 TABLET ORAL 3 TIMES DAILY
Qty: 30 TABLET | Refills: 3 | Status: SHIPPED | OUTPATIENT
Start: 2023-12-14 | End: 2024-01-09

## 2023-12-14 NOTE — TELEPHONE ENCOUNTER
Received request via: Pharmacy    Was the patient seen in the last year in this department? Yes  7/6/23  Does the patient have an active prescription (recently filled or refills available) for medication(s) requested? No    Does the patient have retirement Plus and need 100 day supply (blood pressure, diabetes and cholesterol meds only)? Medication is not for cholesterol, blood pressure or diabetes

## 2023-12-15 ENCOUNTER — PATIENT MESSAGE (OUTPATIENT)
Dept: CARDIOLOGY | Facility: MEDICAL CENTER | Age: 58
End: 2023-12-15
Payer: COMMERCIAL

## 2023-12-15 DIAGNOSIS — I48.0 PAROXYSMAL ATRIAL FIBRILLATION (HCC): ICD-10-CM

## 2023-12-18 NOTE — TELEPHONE ENCOUNTER
BROOKE- Please advise, patient reporting she is back in Afib, she restarting her Xarelto.  Okay to add back to medication list and send in new RX?

## 2023-12-19 ENCOUNTER — OFFICE VISIT (OUTPATIENT)
Dept: MEDICAL GROUP | Facility: LAB | Age: 58
End: 2023-12-19
Payer: COMMERCIAL

## 2023-12-19 VITALS
DIASTOLIC BLOOD PRESSURE: 76 MMHG | HEART RATE: 73 BPM | SYSTOLIC BLOOD PRESSURE: 118 MMHG | RESPIRATION RATE: 16 BRPM | BODY MASS INDEX: 36.65 KG/M2 | HEIGHT: 65 IN | OXYGEN SATURATION: 97 % | WEIGHT: 220 LBS | TEMPERATURE: 97.4 F

## 2023-12-19 DIAGNOSIS — J01.10 ACUTE NON-RECURRENT FRONTAL SINUSITIS: ICD-10-CM

## 2023-12-19 DIAGNOSIS — I48.0 PAROXYSMAL ATRIAL FIBRILLATION (HCC): ICD-10-CM

## 2023-12-19 PROCEDURE — 3078F DIAST BP <80 MM HG: CPT | Performed by: FAMILY MEDICINE

## 2023-12-19 PROCEDURE — 99214 OFFICE O/P EST MOD 30 MIN: CPT | Performed by: FAMILY MEDICINE

## 2023-12-19 PROCEDURE — 3074F SYST BP LT 130 MM HG: CPT | Performed by: FAMILY MEDICINE

## 2023-12-19 RX ORDER — AMOXICILLIN AND CLAVULANATE POTASSIUM 875; 125 MG/1; MG/1
1 TABLET, FILM COATED ORAL 2 TIMES DAILY
Qty: 10 TABLET | Refills: 0 | Status: SHIPPED
Start: 2023-12-19 | End: 2023-12-24

## 2023-12-19 ASSESSMENT — FIBROSIS 4 INDEX: FIB4 SCORE: 0.92

## 2023-12-19 NOTE — PROGRESS NOTES
"Subjective:     CC: Head cold    HPI:   Vicky presents today with concern for persistent cold symptoms    Symptoms began 10 days ago .Fatigue x 1 week then congestion, sore throat. Mild cough and congestion continues. No fevers, some lightheadedness this morning. Felt better yesterday but then worse again this morning.  Did have negative home COVID testing.  Has had a recurrence of paroxysmal A-fib, discussed with cardiology and restarted on Xarelto.    Medications, past medical history, allergies, and social history have been reviewed and updated.      Objective:       Exam:  /76   Pulse 73   Temp 36.3 °C (97.4 °F) (Temporal)   Resp 16   Ht 1.651 m (5' 5\")   Wt 99.8 kg (220 lb)   LMP 09/18/2013   SpO2 97%   BMI 36.61 kg/m²  Body mass index is 36.61 kg/m².    Constitutional: Alert. Well appearing. No distress.  Skin: Warm, dry, good turgor, no visible rashes.  ENMT: Moist mucous membranes. Normal dentition.  Oropharynx is clear.  Tympanic membranes clear.  Mild anterior cervical LAD.  Mild frontal sinus tenderness.  Respiratory: Normal effort. Lungs are clear to auscultation bilaterally.  Cardiovascular: Irregularly irregular rate and rhythm. Normal S1/S2. No murmurs, rubs or gallops.   Neuro: Moves all four extremities. No facial droop.  Psych: Answers questions appropriately. Normal affect and mood.    Assessment & Plan:     58 y.o. female with the following -     1. Acute non-recurrent frontal sinusitis  With 10 days of upper respiratory symptoms with improvement but then worsening again this morning.  Suspect viral URI but given persistent symptoms and second worsening will provide paper Rx of Augmentin to cover bacterial sinusitis to start in 2 to 3 days if she is not significantly improving.  For now continue with supportive care including rest, fluids.  - amoxicillin-clavulanate (AUGMENTIN) 875-125 MG Tab; Take 1 Tablet by mouth 2 times a day for 5 days.  Dispense: 10 Tablet; Refill: 0    2. " Paroxysmal atrial fibrillation (HCC)  Recent recurrence, she has discussed with cardiology and restarted on Xarelto.      Please note that this note was created using voice recognition software.

## 2023-12-21 ENCOUNTER — APPOINTMENT (OUTPATIENT)
Dept: MEDICAL GROUP | Facility: LAB | Age: 58
End: 2023-12-21
Payer: COMMERCIAL

## 2024-01-09 ENCOUNTER — OFFICE VISIT (OUTPATIENT)
Dept: CARDIOLOGY | Facility: MEDICAL CENTER | Age: 59
End: 2024-01-09
Attending: NURSE PRACTITIONER
Payer: COMMERCIAL

## 2024-01-09 ENCOUNTER — TELEPHONE (OUTPATIENT)
Dept: CARDIOLOGY | Facility: MEDICAL CENTER | Age: 59
End: 2024-01-09

## 2024-01-09 VITALS
SYSTOLIC BLOOD PRESSURE: 94 MMHG | OXYGEN SATURATION: 98 % | BODY MASS INDEX: 37.45 KG/M2 | HEART RATE: 68 BPM | DIASTOLIC BLOOD PRESSURE: 62 MMHG | WEIGHT: 224.8 LBS | HEIGHT: 65 IN

## 2024-01-09 DIAGNOSIS — I48.19 PERSISTENT ATRIAL FIBRILLATION WITH RVR (HCC): ICD-10-CM

## 2024-01-09 DIAGNOSIS — E78.2 MIXED HYPERLIPIDEMIA: ICD-10-CM

## 2024-01-09 DIAGNOSIS — I10 HYPERTENSION, UNSPECIFIED TYPE: ICD-10-CM

## 2024-01-09 DIAGNOSIS — I48.19 PERSISTENT ATRIAL FIBRILLATION (HCC): ICD-10-CM

## 2024-01-09 DIAGNOSIS — I10 ESSENTIAL HYPERTENSION: ICD-10-CM

## 2024-01-09 DIAGNOSIS — Z79.899 HIGH RISK MEDICATION USE: ICD-10-CM

## 2024-01-09 DIAGNOSIS — I48.19 HYPERCOAGULABLE STATE DUE TO PERSISTENT ATRIAL FIBRILLATION (HCC): ICD-10-CM

## 2024-01-09 DIAGNOSIS — D68.69 HYPERCOAGULABLE STATE DUE TO PERSISTENT ATRIAL FIBRILLATION (HCC): ICD-10-CM

## 2024-01-09 DIAGNOSIS — G47.33 OSA (OBSTRUCTIVE SLEEP APNEA): ICD-10-CM

## 2024-01-09 PROCEDURE — 99214 OFFICE O/P EST MOD 30 MIN: CPT | Performed by: NURSE PRACTITIONER

## 2024-01-09 PROCEDURE — 3074F SYST BP LT 130 MM HG: CPT | Performed by: NURSE PRACTITIONER

## 2024-01-09 PROCEDURE — 93005 ELECTROCARDIOGRAM TRACING: CPT | Performed by: NURSE PRACTITIONER

## 2024-01-09 PROCEDURE — 3078F DIAST BP <80 MM HG: CPT | Performed by: NURSE PRACTITIONER

## 2024-01-09 PROCEDURE — 99212 OFFICE O/P EST SF 10 MIN: CPT | Performed by: NURSE PRACTITIONER

## 2024-01-09 RX ORDER — FLECAINIDE ACETATE 50 MG/1
50 TABLET ORAL 2 TIMES DAILY
Qty: 60 TABLET | Refills: 3 | Status: SHIPPED | OUTPATIENT
Start: 2024-01-09 | End: 2024-03-25

## 2024-01-09 RX ORDER — AMLODIPINE BESYLATE 2.5 MG/1
2.5 TABLET ORAL
Qty: 90 TABLET | Refills: 3 | Status: SHIPPED | OUTPATIENT
Start: 2024-01-09 | End: 2024-01-11

## 2024-01-09 ASSESSMENT — FIBROSIS 4 INDEX: FIB4 SCORE: 0.92

## 2024-01-09 NOTE — PROGRESS NOTES
Chief Complaint   Patient presents with    Hypertension    Atrial Fibrillation       Subjective     Vicky Nino is a 58 y.o. female who presents today for follow up of her A-fib.    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 Galvan on 10/23/2023, who recommended for patient to continue to optimize risk factors for A-fib.  Patient unable to tolerate CPAP mask due to her deviated septum.    Today, patient reports she has been in persistent atrial fibrillation via her Apple Watch since 12/15/2023.  Patient has in the past, self converted to sinus rhythm prior to DCCV.  Patient reports she only knows that she is in A-fib via her watch which she reviews at least daily.  Patient concerned regarding her family history of heart failure and her mother needing a pacemaker.  Patient denies chest pain, shortness of breath, dizziness/lightheadedness, orthopnea, PND or Edema.     EKG in office today personally reviewed by myself as atrial fibrillation with RVR.    We discussed proceeding with DCCV.  In the interim, patient agreeable to initiate anti-rhythmic therapy in an attempt to assist with cardioversion prior to DCCV as patient has been compliant with OAC since her watch alerted her that she was in atrial fibrillation in August.  We will also obtain echocardiogram.  We will decrease her amiodarone therapy as she is her blood pressure has been low.  Patient to follow-up with Dr. Galvan in electrophysiology clinic soon as available.  Patient verbalizes understanding and agreeable to plan of care.    Past Medical History:   Diagnosis Date    Arrhythmia     rapid heart rate     Asthma     has not used inhaler in a year    Atrial fibrillation (HCC)     Chest discomfort 11/18/2013    Chickenpox     Cough     Depression     High cholesterol     History of anemia     Hypertension     Obesity     Palpitations 11/18/2013    Psychiatric disorder     anxiety    Rhinitis     Snoring     Ventricular  bebetohaja 11/18/2013    Wears glasses     Weight loss      Past Surgical History:   Procedure Laterality Date    KNEE ARTHROSCOPY Right 06/03/2019    Procedure: ARTHROSCOPY, KNEE;  Surgeon: Jag Gomez M.D.;  Location: [facility];  Service: Orthopedics    MENISCECTOMY, KNEE, MEDIAL Right 06/03/2019    Procedure: MENISCECTOMY, KNEE, MEDIAL, LATERAL-  PARTIAL;  Surgeon: Jag Gomez M.D.;  Location: [facility];  Service: Orthopedics    CHONDROPLASTY Right 06/03/2019    Procedure: CHONDROPLASTY- patella;  Surgeon: Jag Gomez M.D.;  Location: [facility];  Service: Orthopedics    ARTHROSCOPY, KNEE      COLONOSCOPY       Family History   Problem Relation Age of Onset    Diabetes Mother     Genitourinary () Problems Mother         renal failure, kidey transplant    Hypertension Mother     Heart Disease Mother     Kidney Disease Mother     Thyroid Sister         2 with removal due to Graves    Hypertension Brother     Cancer Brother 60        colon cancer     Colon Cancer Brother     Cancer Paternal Grandmother         Breast cancer    Cancer Paternal Grandfather         lung    Cancer Maternal Aunt     Hypertension Father     Cancer Maternal Grandmother         Skin Cancer    Cancer Maternal Aunt         Breast cancer    Sleep Apnea Sister      Social History     Socioeconomic History    Marital status:      Spouse name: Not on file    Number of children: Not on file    Years of education: Not on file    Highest education level: Not on file   Occupational History    Not on file   Tobacco Use    Smoking status: Never    Smokeless tobacco: Never   Vaping Use    Vaping Use: Never used   Substance and Sexual Activity    Alcohol use: Not Currently     Comment: rarely drink    Drug use: Never    Sexual activity: Not Currently     Partners: Male   Other Topics Concern    Not on file   Social History Narrative    Not on file     Social Determinants of Health      Financial Resource Strain: Not on file   Food Insecurity: Not on file   Transportation Needs: Not on file   Physical Activity: Not on file   Stress: Not on file   Social Connections: Not on file   Intimate Partner Violence: Not on file   Housing Stability: Not on file     No Known Allergies  Outpatient Encounter Medications as of 1/9/2024   Medication Sig Dispense Refill    flecainide (TAMBOCOR) 50 MG tablet Take 1 Tablet by mouth 2 times a day. (Patient not taking: Reported on 1/11/2024) 60 Tablet 3    [DISCONTINUED] amLODIPine (NORVASC) 2.5 MG Tab Take 1 Tablet by mouth every day. (Patient taking differently: Take 5 mg by mouth every day.) 90 Tablet 3    rivaroxaban (XARELTO) 20 MG Tab tablet Take 1 Tablet by mouth at bedtime. 90 Tablet 3    metoprolol SR (TOPROL XL) 25 MG TABLET SR 24 HR Take 1 Tablet by mouth every day. 100 Tablet 3    omeprazole (PRILOSEC) 20 MG delayed-release capsule Take 20 mg by mouth every day.      albuterol 108 (90 Base) MCG/ACT Aero Soln inhalation aerosol INHALE 2 PUFFS BY MOUTH EVERY 6 HOURS AS NEEDED FOR SHORTNESS OF BREATH 8.5 Inhaler 1    fluticasone (FLONASE) 50 MCG/ACT nasal spray Spray 2 Sprays in nose every day. 1 Bottle 11    multivitamin (THERAGRAN) Tab Take 1 Tab by mouth every day.      COLLAGEN PO Take  by mouth every day. 1/2 scoop      Cholecalciferol (VITAMIN D) 1000 UNIT CAPS Take 1 Cap by mouth every day.      [DISCONTINUED] acyclovir (ZOVIRAX) 800 MG Tab TAKE 1 TABLET BY MOUTH THREE TIMES A DAY 30 Tablet 3    [DISCONTINUED] amLODIPine (NORVASC) 5 MG Tab TAKE 1 TABLET BY MOUTH EVERY DAY 90 Tablet 0    [DISCONTINUED] fluticasone-salmeterol (ADVAIR) 250-50 MCG/ACT AEROSOL POWDER, BREATH ACTIVATED Inhale 1 Puff every 12 hours. (Patient not taking: Reported on 10/23/2023) 60 Each 2     No facility-administered encounter medications on file as of 1/9/2024.     ROS Complete review of systems negative except as noted in HPI/subjective           Objective     BP 94/62 (BP  "Location: Left arm, Patient Position: Sitting, BP Cuff Size: Adult)   Pulse 68   Ht 1.651 m (5' 5\")   Wt 102 kg (224 lb 12.8 oz)   LMP 09/18/2013   SpO2 98%   BMI 37.41 kg/m²     Physical Exam  Constitutional:       Appearance: Normal appearance. She is obese.   HENT:      Head: Normocephalic and atraumatic.   Neck:      Vascular: No JVD.   Cardiovascular:      Rate and Rhythm: Normal rate and regular rhythm.      Pulses: Normal pulses.      Heart sounds: Normal heart sounds. No murmur heard.     No friction rub.   Pulmonary:      Effort: Pulmonary effort is normal. No respiratory distress.      Breath sounds: Normal breath sounds.   Abdominal:      General: There is no distension.      Palpations: Abdomen is soft.   Musculoskeletal:      Right lower leg: No edema.      Left lower leg: No edema.   Skin:     General: Skin is warm and dry.   Neurological:      General: No focal deficit present.      Mental Status: She is alert and oriented to person, place, and time.   Psychiatric:         Mood and Affect: Mood normal.         Behavior: Behavior normal.            Lab Results   Component Value Date/Time    CHOLSTRLTOT 172 08/18/2022 10:33 AM    LDL 93 08/18/2022 10:33 AM    HDL 54 08/18/2022 10:33 AM    TRIGLYCERIDE 127 08/18/2022 10:33 AM       Lab Results   Component Value Date/Time    SODIUM 139 09/02/2023 10:08 AM    POTASSIUM 3.9 09/02/2023 10:08 AM    CHLORIDE 105 09/02/2023 10:08 AM    CO2 24 09/02/2023 10:08 AM    GLUCOSE 119 (H) 09/02/2023 10:08 AM    BUN 11 09/02/2023 10:08 AM    CREATININE 0.74 09/02/2023 10:08 AM    CREATININE 0.69 04/12/2010 10:45 AM    BUNCREATRAT 13 04/12/2010 10:45 AM    GLOMRATE >59 04/12/2010 10:45 AM     Lab Results   Component Value Date/Time    ALKPHOSPHAT 77 09/02/2023 10:08 AM    ASTSGOT 19 09/02/2023 10:08 AM    ALTSGPT 17 09/02/2023 10:08 AM    TBILIRUBIN 0.4 09/02/2023 10:08 AM          Assessment & Plan     1. Hypertension, unspecified type  EKG    flecainide " (TAMBOCOR) 50 MG tablet    CL-CARDIOVERSION    EC-KEM W/O CONT    EC-ECHOCARDIOGRAM COMPLETE W/O CONT      2. Essential hypertension  flecainide (TAMBOCOR) 50 MG tablet    CL-CARDIOVERSION    EC-KEM W/O CONT    EC-ECHOCARDIOGRAM COMPLETE W/O CONT    DISCONTINUED: amLODIPine (NORVASC) 2.5 MG Tab      3. Persistent atrial fibrillation (HCC)  EKG    flecainide (TAMBOCOR) 50 MG tablet    CL-CARDIOVERSION    EC-KEM W/O CONT    EC-ECHOCARDIOGRAM COMPLETE W/O CONT      4. PHIL (obstructive sleep apnea)  EKG    EC-ECHOCARDIOGRAM COMPLETE W/O CONT      5. Hypercoagulable state due to persistent atrial fibrillation (HCC)  EKG    flecainide (TAMBOCOR) 50 MG tablet    CL-CARDIOVERSION    EC-KEM W/O CONT    EC-ECHOCARDIOGRAM COMPLETE W/O CONT      6. High risk medication use  EKG    flecainide (TAMBOCOR) 50 MG tablet    CL-CARDIOVERSION    EC-KEM W/O CONT    EC-ECHOCARDIOGRAM COMPLETE W/O CONT      7. Persistent atrial fibrillation with RVR (HCC)  EKG    flecainide (TAMBOCOR) 50 MG tablet    CL-CARDIOVERSION    EC-KEM W/O CONT    EC-ECHOCARDIOGRAM COMPLETE W/O CONT      8. Mixed hyperlipidemia            Medical Decision Making: Today's Assessment/Status/Plan:          Previously paroxysmal, now persistent atrial fibrillation  -A-fib with RVR on EKG in office today  -[id number] Score (CHF/CM, HTN, Age, DM, CVA, Vascular disease, Gender): 2 for gender and hypertension  -Medications: Continue metoprolol to 25 mg daily  -Add flecainide 50 mg twice daily.  +  -Anticoagulation: Xarelto.  Patient endorses strict compliance since August  -EKG in office prior to KEM/DCCV, if remains in atrial fibrillation proceed with cardioversion  -Follow-up with Dr. Galvan at next available    Hypertension  -Decrease amlodipine 5 to 2.5 mg daily  - continue metoprolol   -Blood pressure well-controlled at home.  Patient concerned regarding over control    PHIL  -Unable to tolerate CPAP therapy  -We discussed importance of treating nocturnal hypoxia due  to causing increased risk for cardiac disease; patient verbalizes understanding.  -Per sleep medicine    Hyperlipidemia; BMI 37  -Most recent LDL 93  -On no medication, check lipid panel annually  -Patient frustrated lack of weight loss due to exercise routine of Pilates at least 3 times a week    FU in clinic in 4 weeks with electrophysiology clinic. Sooner if needed.    Patient verbalizes understanding and agrees with the plan of care.     DOMITILA Garcia, HF-Cert   [facility]  [phone]    PLEASE NOTE: This Note was created using voice recognition Software. I have made every reasonable attempt to correct obvious errors, but I expect that there are errors of grammar and possibly content that I did not discover before finalizing the note

## 2024-01-09 NOTE — TELEPHONE ENCOUNTER
Loly,    For this KEM/CV you ordered, do you want anesthesia or conscious sedation? Any time frame you want this scheduled in?

## 2024-01-10 LAB — EKG IMPRESSION: NORMAL

## 2024-01-10 PROCEDURE — 93010 ELECTROCARDIOGRAM REPORT: CPT | Performed by: INTERNAL MEDICINE

## 2024-01-10 NOTE — PROGRESS NOTES
No chief complaint on file.      Subjective     Vicky Nino is a 58 y.o. female who presents today for follow up paroxysmal Afib.    She was seen in consultation with Dr. Juni Galvan Oct 2023.  Last seen 1/9/24 with Loly HERNANDES for recurrent paroxysmal Afib with RVR.  She was scheduled for KEM guided dccv.     She is additionally followed by Adelia HERNANDES for cardiology.     Past Medical History:   Diagnosis Date    Arrhythmia     rapid heart rate     Asthma     has not used inhaler in a year    Atrial fibrillation (HCC)     Chest discomfort 11/18/2013    Chickenpox     Cough     Depression     High cholesterol     History of anemia     Hypertension     Obesity     Palpitations 11/18/2013    Psychiatric disorder     anxiety    Rhinitis     Snoring     Ventricular bigeminy 11/18/2013    Wears glasses     Weight loss      Past Surgical History:   Procedure Laterality Date    KNEE ARTHROSCOPY Right 06/03/2019    Procedure: ARTHROSCOPY, KNEE;  Surgeon: Jag Gomez M.D.;  Location: [facility];  Service: Orthopedics    MENISCECTOMY, KNEE, MEDIAL Right 06/03/2019    Procedure: MENISCECTOMY, KNEE, MEDIAL, LATERAL-  PARTIAL;  Surgeon: Jag Gomez M.D.;  Location: [facility];  Service: Orthopedics    CHONDROPLASTY Right 06/03/2019    Procedure: CHONDROPLASTY- patella;  Surgeon: Jag Gomez M.D.;  Location: [facility];  Service: Orthopedics    ARTHROSCOPY, KNEE      COLONOSCOPY       Family History   Problem Relation Age of Onset    Diabetes Mother     Genitourinary () Problems Mother         renal failure, kidey transplant    Hypertension Mother     Heart Disease Mother     Kidney Disease Mother     Thyroid Sister         2 with removal due to Graves    Hypertension Brother     Cancer Brother 60        colon cancer     Colon Cancer Brother     Cancer Paternal Grandmother         Breast cancer    Cancer Paternal Grandfather         lung    Cancer  Maternal Aunt     Hypertension Father     Cancer Maternal Grandmother         Skin Cancer    Cancer Maternal Aunt         Breast cancer    Sleep Apnea Sister      Social History     Socioeconomic History    Marital status:      Spouse name: Not on file    Number of children: Not on file    Years of education: Not on file    Highest education level: Not on file   Occupational History    Not on file   Tobacco Use    Smoking status: Never    Smokeless tobacco: Never   Vaping Use    Vaping Use: Never used   Substance and Sexual Activity    Alcohol use: Not Currently     Comment: rarely drink    Drug use: Never    Sexual activity: Not Currently     Partners: Male   Other Topics Concern    Not on file   Social History Narrative    Not on file     Social Determinants of Health     Financial Resource Strain: Not on file   Food Insecurity: Not on file   Transportation Needs: Not on file   Physical Activity: Not on file   Stress: Not on file   Social Connections: Not on file   Intimate Partner Violence: Not on file   Housing Stability: Not on file     No Known Allergies  Outpatient Encounter Medications as of 1/11/2024   Medication Sig Dispense Refill    amLODIPine (NORVASC) 2.5 MG Tab Take 1 Tablet by mouth every day. 90 Tablet 3    flecainide (TAMBOCOR) 50 MG tablet Take 1 Tablet by mouth 2 times a day. 60 Tablet 3    rivaroxaban (XARELTO) 20 MG Tab tablet Take 1 Tablet by mouth at bedtime. 90 Tablet 3    metoprolol SR (TOPROL XL) 25 MG TABLET SR 24 HR Take 1 Tablet by mouth every day. 100 Tablet 3    omeprazole (PRILOSEC) 20 MG delayed-release capsule Take 20 mg by mouth every day.      albuterol 108 (90 Base) MCG/ACT Aero Soln inhalation aerosol INHALE 2 PUFFS BY MOUTH EVERY 6 HOURS AS NEEDED FOR SHORTNESS OF BREATH 8.5 Inhaler 1    fluticasone (FLONASE) 50 MCG/ACT nasal spray Spray 2 Sprays in nose every day. 1 Bottle 11    multivitamin (THERAGRAN) Tab Take 1 Tab by mouth every day.      COLLAGEN PO Take  by mouth  every day. 1/2 scoop      Cholecalciferol (VITAMIN D) 1000 UNIT CAPS Take 1 Cap by mouth every day.       No facility-administered encounter medications on file as of 1/11/2024.     ROS           Objective     LMP 09/18/2013     Physical Exam           Assessment & Plan     No diagnosis found.    Medical Decision Making: Today's Assessment/Status/Plan:

## 2024-01-10 NOTE — TELEPHONE ENCOUNTER
Patient is scheduled for a KEM/Cardioversion with anesthesia on 01- with Dr. Phillips. Patient has been instructed to check in at 6:00 am for 8:00 procedure. No meds to hold. Message sent to authorizations.  No device. FYI sent to Joelle.

## 2024-01-11 ENCOUNTER — OFFICE VISIT (OUTPATIENT)
Dept: CARDIOLOGY | Facility: MEDICAL CENTER | Age: 59
End: 2024-01-11
Attending: NURSE PRACTITIONER
Payer: COMMERCIAL

## 2024-01-11 VITALS
SYSTOLIC BLOOD PRESSURE: 118 MMHG | WEIGHT: 224 LBS | RESPIRATION RATE: 16 BRPM | HEIGHT: 65 IN | HEART RATE: 60 BPM | BODY MASS INDEX: 37.32 KG/M2 | DIASTOLIC BLOOD PRESSURE: 76 MMHG | OXYGEN SATURATION: 96 %

## 2024-01-11 DIAGNOSIS — I48.0 PAROXYSMAL ATRIAL FIBRILLATION (HCC): ICD-10-CM

## 2024-01-11 DIAGNOSIS — I10 ESSENTIAL HYPERTENSION: ICD-10-CM

## 2024-01-11 DIAGNOSIS — G47.33 OSA (OBSTRUCTIVE SLEEP APNEA): ICD-10-CM

## 2024-01-11 DIAGNOSIS — D68.318 CIRCULATING ANTICOAGULANTS (HCC): ICD-10-CM

## 2024-01-11 PROCEDURE — 99214 OFFICE O/P EST MOD 30 MIN: CPT | Performed by: NURSE PRACTITIONER

## 2024-01-11 PROCEDURE — 99212 OFFICE O/P EST SF 10 MIN: CPT | Performed by: NURSE PRACTITIONER

## 2024-01-11 PROCEDURE — 3074F SYST BP LT 130 MM HG: CPT | Performed by: NURSE PRACTITIONER

## 2024-01-11 PROCEDURE — 93005 ELECTROCARDIOGRAM TRACING: CPT | Performed by: NURSE PRACTITIONER

## 2024-01-11 PROCEDURE — 3078F DIAST BP <80 MM HG: CPT | Performed by: NURSE PRACTITIONER

## 2024-01-11 RX ORDER — AMLODIPINE BESYLATE 2.5 MG/1
2.5 TABLET ORAL
Qty: 90 TABLET | Refills: 3 | Status: SHIPPED | OUTPATIENT
Start: 2024-01-11

## 2024-01-11 ASSESSMENT — ENCOUNTER SYMPTOMS
LOSS OF CONSCIOUSNESS: 0
DIZZINESS: 0
WEIGHT LOSS: 0
FOCAL WEAKNESS: 0
HEADACHES: 0
PND: 0
SPEECH CHANGE: 0
SPUTUM PRODUCTION: 0
PALPITATIONS: 0
VOMITING: 0
CHILLS: 0
FEVER: 0
ORTHOPNEA: 0
COUGH: 0
NAUSEA: 0
HEARTBURN: 0
TREMORS: 0
TINGLING: 0
WHEEZING: 0
SENSORY CHANGE: 0
HEMOPTYSIS: 0
SHORTNESS OF BREATH: 0

## 2024-01-11 ASSESSMENT — FIBROSIS 4 INDEX: FIB4 SCORE: 0.92

## 2024-01-11 NOTE — TELEPHONE ENCOUNTER
Received phone call from Winnie Sexton. Asked if we could move patient up sooner. Ok with conscious sedation. Patient is now scheduled for a KEM/Cardioversion without anesthesia on 01- with Dr. Najera. Patient has been instructed to check in at 7:30 am for 9:30 procedure. No meds to hold. Message sent to authorizations. Sent [facility] message to pt with instructions. No device. FYI sent to Loly Cancino Erica.

## 2024-01-11 NOTE — PROGRESS NOTES
Chief Complaint   Patient presents with    Atrial Fibrillation     F/V Dx: Paroxysmal atrial fibrillation (HCC)       Subjective     Vicky Nino is a 58 y.o. female who presents today for follow-up paroxysmal atrial fibrillation.    She was seen in consultation with Dr. Cole back in October.  Recently saw Loly Pradhan for concerns of recurrent atrial fibrillation.  Has additionally seen Adelia HERNANDES.    Last office visit was prescribed low-dose flecainide and plan for cardioversion.  Recommended to follow-up with EP.    Patient medical history significant for hypertension, hyperlipidemia, obstructive sleep apnea.    Today in follow-up she tells me that she received notification from her iWatch on December 15 that she was back in A-fib.  Reports that she has had daily notifications of A-fib since then.  She started back on her Xarelto December 15, reports that she has not missed a dose since then.  Reports that she can feel the irregularity of the atrial arrhythmia and probably more fatigued than usual.  No overt shortness of breath.  She does state that she has a deviated septum.  No chest pain, no activity intolerance, no peripheral edema.        Past Medical History:   Diagnosis Date    Arrhythmia     rapid heart rate     Asthma     has not used inhaler in a year    Atrial fibrillation (HCC)     Chest discomfort 11/18/2013    Chickenpox     Cough     Depression     High cholesterol     History of anemia     Hypertension     Obesity     Palpitations 11/18/2013    Psychiatric disorder     anxiety    Rhinitis     Snoring     Ventricular bigeminy 11/18/2013    Wears glasses     Weight loss      Past Surgical History:   Procedure Laterality Date    KNEE ARTHROSCOPY Right 06/03/2019    Procedure: ARTHROSCOPY, KNEE;  Surgeon: Jag Gomez M.D.;  Location: SUR[facility]ERY [facility];  Service: Orthopedics    MENISCECTOMY, KNEE, MEDIAL Right 06/03/2019    Procedure: MENISCECTOMY, KNEE, MEDIAL, LATERAL-   PARTIAL;  Surgeon: Jag Gomez M.D.;  Location: SURGERY [facility];  Service: Orthopedics    CHONDROPLASTY Right 06/03/2019    Procedure: CHONDROPLASTY- patella;  Surgeon: Jag Gomez M.D.;  Location: SURGERY [facility];  Service: Orthopedics    ARTHROSCOPY, KNEE      COLONOSCOPY       Family History   Problem Relation Age of Onset    Diabetes Mother     Genitourinary () Problems Mother         renal failure, kidey transplant    Hypertension Mother     Heart Disease Mother     Kidney Disease Mother     Thyroid Sister         2 with removal due to Graves    Hypertension Brother     Cancer Brother 60        colon cancer     Colon Cancer Brother     Cancer Paternal Grandmother         Breast cancer    Cancer Paternal Grandfather         lung    Cancer Maternal Aunt     Hypertension Father     Cancer Maternal Grandmother         Skin Cancer    Cancer Maternal Aunt         Breast cancer    Sleep Apnea Sister      Social History     Socioeconomic History    Marital status:      Spouse name: Not on file    Number of children: Not on file    Years of education: Not on file    Highest education level: Not on file   Occupational History    Not on file   Tobacco Use    Smoking status: Never    Smokeless tobacco: Never   Vaping Use    Vaping Use: Never used   Substance and Sexual Activity    Alcohol use: Not Currently     Comment: rarely drink    Drug use: Never    Sexual activity: Not Currently     Partners: Male   Other Topics Concern    Not on file   Social History Narrative    Not on file     Social Determinants of Health     Financial Resource Strain: Not on file   Food Insecurity: Not on file   Transportation Needs: Not on file   Physical Activity: Not on file   Stress: Not on file   Social Connections: Not on file   Intimate Partner Violence: Not on file   Housing Stability: Not on file     No Known Allergies  Outpatient Encounter Medications as of 1/11/2024   Medication Sig Dispense Refill     amLODIPine (NORVASC) 2.5 MG Tab Take 1 Tablet by mouth every day. 90 Tablet 3    flecainide (TAMBOCOR) 50 MG tablet Take 1 Tablet by mouth 2 times a day. 60 Tablet 3    rivaroxaban (XARELTO) 20 MG Tab tablet Take 1 Tablet by mouth at bedtime. 90 Tablet 3    metoprolol SR (TOPROL XL) 25 MG TABLET SR 24 HR Take 1 Tablet by mouth every day. 100 Tablet 3    omeprazole (PRILOSEC) 20 MG delayed-release capsule Take 20 mg by mouth every day.      albuterol 108 (90 Base) MCG/ACT Aero Soln inhalation aerosol INHALE 2 PUFFS BY MOUTH EVERY 6 HOURS AS NEEDED FOR SHORTNESS OF BREATH 8.5 Inhaler 1    fluticasone (FLONASE) 50 MCG/ACT nasal spray Spray 2 Sprays in nose every day. 1 Bottle 11    multivitamin (THERAGRAN) Tab Take 1 Tab by mouth every day.      COLLAGEN PO Take  by mouth every day. 1/2 scoop      Cholecalciferol (VITAMIN D) 1000 UNIT CAPS Take 1 Cap by mouth every day.       No facility-administered encounter medications on file as of 1/11/2024.     Review of Systems   Constitutional:  Positive for malaise/fatigue. Negative for chills, fever and weight loss.   HENT:  Negative for congestion and tinnitus.    Respiratory:  Negative for cough, hemoptysis, sputum production, shortness of breath and wheezing.    Cardiovascular:  Negative for chest pain, palpitations, orthopnea, leg swelling and PND.   Gastrointestinal:  Negative for heartburn, nausea and vomiting.   Skin:  Negative for rash.   Neurological:  Negative for dizziness, tingling, tremors, sensory change, speech change, focal weakness, loss of consciousness and headaches.              Objective     LMP 09/18/2013     Physical Exam  Vitals reviewed.   Constitutional:       Appearance: Normal appearance.   HENT:      Head: Normocephalic and atraumatic.      Mouth/Throat:      Mouth: Mucous membranes are moist.      Pharynx: Oropharynx is clear.   Eyes:      Extraocular Movements: Extraocular movements intact.      Conjunctiva/sclera: Conjunctivae normal.       Pupils: Pupils are equal, round, and reactive to light.   Cardiovascular:      Rate and Rhythm: Tachycardia present. Rhythm irregularly irregular.      Pulses: Normal pulses.   Pulmonary:      Effort: Pulmonary effort is normal.      Breath sounds: Normal breath sounds. No wheezing, rhonchi or rales.   Musculoskeletal:      Cervical back: Normal range of motion and neck supple.      Right lower leg: No edema.      Left lower leg: No edema.   Skin:     General: Skin is warm and dry.      Capillary Refill: Capillary refill takes 2 to 3 seconds.   Neurological:      Mental Status: She is alert and oriented to person, place, and time.   Psychiatric:         Mood and Affect: Mood normal.         Behavior: Behavior normal.         Thought Content: Thought content normal.         Judgment: Judgment normal.              Assessment & Plan     1. Paroxysmal atrial fibrillation (HCC)  EKG      2. Essential hypertension  amLODIPine (NORVASC) 2.5 MG Tab      3. PHIL (obstructive sleep apnea)        4. Circulating anticoagulants (HCC)            Medical Decision Making: Today's Assessment/Status/Plan:   Paroxysmal Afib  - Now with persistent episode, starting at least on Dec 15th (from I watch notification).    - She is in RVR today, and earlier this week when in the office.  Discussed importance of trying to achieve some improved rate control until rhythm converted.  Will try to increase Toprol to slightly (she has had mild bradycardia in the past).  Will increase to 37.5 mg to start with .  - Discussed moving up cardioversion to next week.  She is agreeable to this. She will have been on xarelto for >30 days at this point.  She confirms that she has not missed any doses of Xarelto since resuming on 12/15/23.   - Will premedicate with Flecainide 2 days prior to cardioversion and continue post cardioversion.  Echo Aug 2022 with preserved ef, no wma, no significant structural abnormalities.   - Discussed long term may be beneficial  to consider ablation for rhythm control given episode of persistent Afib.  She can discuss this further with Dr. Galvan when she see's him post cardioversion.     2. HTN:  - Blood pressure is controled today, was mildly low last office visit.  Will decrease aminoadipic to 2.5 mg daily to allow for extra Toprol.    3. PHIL:  - currently noted treated.  Reports trouble tolerating treatment with deviated septum.   - Discussed importance of treating long term management.     4. Anticoagulation:  - Reports compliance without missed doses.  To continue. No reported significnat bleeding events.       The risks, benefits, and alternatives to electrical cardioversion were discussed in great detail. We discussed that conversion of atrial fibrillation to normal rhythm, at least transiently, is successful in 90 to 95% of patients. However, maintaining a normal rhythm depends on a number of factors, including underlying heart disease and antiarrhythmic medications. Atrial fibrillation often recurs with time and other treatments may be necessary. Risks of  cardioversion are low as long as anticoagulation issues are handled appropriately. There is a small (less than 1%) risk of embolic events, including stroke. Risks of electrical shock include mild muscle soreness and mild skin burning at the site of electrode placement. There is also a risk that cardioversion can stimulate more dangerous arrhythmias. The patient verbalized understanding of these potential complications and wishes to proceed with this procedure.      PLEASE NOTE: This Note was created using voice recognition Software. I have made every reasonable attempt to correct obvious errors, but I expect that there are errors of grammar and possibly content that I did not discover before finalizing the note

## 2024-01-11 NOTE — PATIENT INSTRUCTIONS
Try to increase Toprol by 1/2 tab extra  (total 1.5 tabs / 37.5 mg total) ok to take all at night or take one tab at night and 1/2 tab in the AM.    Start Flecainide 2 days prior to cardioversion.

## 2024-01-16 ENCOUNTER — APPOINTMENT (OUTPATIENT)
Dept: CARDIOLOGY | Facility: MEDICAL CENTER | Age: 59
End: 2024-01-16
Attending: NURSE PRACTITIONER
Payer: COMMERCIAL

## 2024-01-17 ENCOUNTER — APPOINTMENT (OUTPATIENT)
Dept: ADMISSIONS | Facility: MEDICAL CENTER | Age: 59
End: 2024-01-17
Attending: INTERNAL MEDICINE
Payer: COMMERCIAL

## 2024-01-17 RX ORDER — FAMOTIDINE 20 MG/1
20 TABLET, FILM COATED ORAL DAILY
COMMUNITY

## 2024-01-18 ENCOUNTER — APPOINTMENT (OUTPATIENT)
Dept: CARDIOLOGY | Facility: MEDICAL CENTER | Age: 59
End: 2024-01-18
Attending: NURSE PRACTITIONER
Payer: COMMERCIAL

## 2024-01-18 ENCOUNTER — HOSPITAL ENCOUNTER (OUTPATIENT)
Facility: MEDICAL CENTER | Age: 59
End: 2024-01-18
Attending: INTERNAL MEDICINE | Admitting: INTERNAL MEDICINE
Payer: COMMERCIAL

## 2024-01-18 VITALS
HEART RATE: 61 BPM | OXYGEN SATURATION: 94 % | RESPIRATION RATE: 19 BRPM | TEMPERATURE: 98.1 F | BODY MASS INDEX: 37.17 KG/M2 | SYSTOLIC BLOOD PRESSURE: 97 MMHG | WEIGHT: 223.11 LBS | DIASTOLIC BLOOD PRESSURE: 65 MMHG | HEIGHT: 65 IN

## 2024-01-18 DIAGNOSIS — I10 ESSENTIAL HYPERTENSION: ICD-10-CM

## 2024-01-18 DIAGNOSIS — I48.19 PERSISTENT ATRIAL FIBRILLATION WITH RVR (HCC): ICD-10-CM

## 2024-01-18 DIAGNOSIS — I48.19 PERSISTENT ATRIAL FIBRILLATION (HCC): ICD-10-CM

## 2024-01-18 DIAGNOSIS — I10 HYPERTENSION, UNSPECIFIED TYPE: ICD-10-CM

## 2024-01-18 DIAGNOSIS — Z79.899 HIGH RISK MEDICATION USE: ICD-10-CM

## 2024-01-18 DIAGNOSIS — I48.19 HYPERCOAGULABLE STATE DUE TO PERSISTENT ATRIAL FIBRILLATION (HCC): ICD-10-CM

## 2024-01-18 DIAGNOSIS — D68.69 HYPERCOAGULABLE STATE DUE TO PERSISTENT ATRIAL FIBRILLATION (HCC): ICD-10-CM

## 2024-01-18 LAB
ALBUMIN SERPL BCP-MCNC: 4.4 G/DL (ref 3.2–4.9)
ALBUMIN/GLOB SERPL: 1.8 G/DL
ALP SERPL-CCNC: 68 U/L (ref 30–99)
ALT SERPL-CCNC: 33 U/L (ref 2–50)
ANION GAP SERPL CALC-SCNC: 9 MMOL/L (ref 7–16)
AST SERPL-CCNC: 21 U/L (ref 12–45)
BILIRUB SERPL-MCNC: 0.4 MG/DL (ref 0.1–1.5)
BUN SERPL-MCNC: 15 MG/DL (ref 8–22)
CALCIUM ALBUM COR SERPL-MCNC: 9.2 MG/DL (ref 8.5–10.5)
CALCIUM SERPL-MCNC: 9.5 MG/DL (ref 8.5–10.5)
CHLORIDE SERPL-SCNC: 108 MMOL/L (ref 96–112)
CO2 SERPL-SCNC: 25 MMOL/L (ref 20–33)
CREAT SERPL-MCNC: 0.7 MG/DL (ref 0.5–1.4)
EKG IMPRESSION: NORMAL
EKG IMPRESSION: NORMAL
ERYTHROCYTE [DISTWIDTH] IN BLOOD BY AUTOMATED COUNT: 39 FL (ref 35.9–50)
GFR SERPLBLD CREATININE-BSD FMLA CKD-EPI: 100 ML/MIN/1.73 M 2
GLOBULIN SER CALC-MCNC: 2.5 G/DL (ref 1.9–3.5)
GLUCOSE SERPL-MCNC: 95 MG/DL (ref 65–99)
HCT VFR BLD AUTO: 39.9 % (ref 37–47)
HGB BLD-MCNC: 13.7 G/DL (ref 12–16)
INR PPP: 1.84 (ref 0.87–1.13)
MCH RBC QN AUTO: 30.4 PG (ref 27–33)
MCHC RBC AUTO-ENTMCNC: 34.3 G/DL (ref 32.2–35.5)
MCV RBC AUTO: 88.5 FL (ref 81.4–97.8)
PLATELET # BLD AUTO: 242 K/UL (ref 164–446)
PMV BLD AUTO: 9.6 FL (ref 9–12.9)
POTASSIUM SERPL-SCNC: 4.1 MMOL/L (ref 3.6–5.5)
PROT SERPL-MCNC: 6.9 G/DL (ref 6–8.2)
PROTHROMBIN TIME: 21.5 SEC (ref 12–14.6)
RBC # BLD AUTO: 4.51 M/UL (ref 4.2–5.4)
SODIUM SERPL-SCNC: 142 MMOL/L (ref 135–145)
WBC # BLD AUTO: 5.1 K/UL (ref 4.8–10.8)

## 2024-01-18 PROCEDURE — 160002 HCHG RECOVERY MINUTES (STAT)

## 2024-01-18 PROCEDURE — 99152 MOD SED SAME PHYS/QHP 5/>YRS: CPT

## 2024-01-18 PROCEDURE — 93005 ELECTROCARDIOGRAM TRACING: CPT | Performed by: INTERNAL MEDICINE

## 2024-01-18 PROCEDURE — 700105 HCHG RX REV CODE 258: Performed by: INTERNAL MEDICINE

## 2024-01-18 PROCEDURE — 85610 PROTHROMBIN TIME: CPT

## 2024-01-18 PROCEDURE — 99152 MOD SED SAME PHYS/QHP 5/>YRS: CPT | Performed by: INTERNAL MEDICINE

## 2024-01-18 PROCEDURE — 93010 ELECTROCARDIOGRAM REPORT: CPT | Mod: 59,76 | Performed by: INTERNAL MEDICINE

## 2024-01-18 PROCEDURE — 160046 HCHG PACU - 1ST 60 MINS PHASE II

## 2024-01-18 PROCEDURE — 160035 HCHG PACU - 1ST 60 MINS PHASE I

## 2024-01-18 PROCEDURE — 80053 COMPREHEN METABOLIC PANEL: CPT

## 2024-01-18 PROCEDURE — 85027 COMPLETE CBC AUTOMATED: CPT

## 2024-01-18 PROCEDURE — 700111 HCHG RX REV CODE 636 W/ 250 OVERRIDE (IP): Mod: JZ | Performed by: INTERNAL MEDICINE

## 2024-01-18 PROCEDURE — 92960 CARDIOVERSION ELECTRIC EXT: CPT | Performed by: INTERNAL MEDICINE

## 2024-01-18 RX ORDER — MIDAZOLAM HYDROCHLORIDE 1 MG/ML
.5-2 INJECTION INTRAMUSCULAR; INTRAVENOUS PRN
Status: DISCONTINUED | OUTPATIENT
Start: 2024-01-18 | End: 2024-01-18 | Stop reason: HOSPADM

## 2024-01-18 RX ORDER — SODIUM CHLORIDE, SODIUM LACTATE, POTASSIUM CHLORIDE, CALCIUM CHLORIDE 600; 310; 30; 20 MG/100ML; MG/100ML; MG/100ML; MG/100ML
INJECTION, SOLUTION INTRAVENOUS CONTINUOUS
Status: DISCONTINUED | OUTPATIENT
Start: 2024-01-18 | End: 2024-01-18 | Stop reason: HOSPADM

## 2024-01-18 RX ORDER — SODIUM CHLORIDE 9 MG/ML
500 INJECTION, SOLUTION INTRAVENOUS
Status: DISCONTINUED | OUTPATIENT
Start: 2024-01-18 | End: 2024-01-18 | Stop reason: HOSPADM

## 2024-01-18 RX ADMIN — FENTANYL CITRATE 50 MCG: 50 INJECTION, SOLUTION INTRAMUSCULAR; INTRAVENOUS at 09:42

## 2024-01-18 RX ADMIN — MIDAZOLAM HYDROCHLORIDE 2 MG: 1 INJECTION, SOLUTION INTRAMUSCULAR; INTRAVENOUS at 09:42

## 2024-01-18 RX ADMIN — SODIUM CHLORIDE, POTASSIUM CHLORIDE, SODIUM LACTATE AND CALCIUM CHLORIDE: 600; 310; 30; 20 INJECTION, SOLUTION INTRAVENOUS at 08:21

## 2024-01-18 RX ADMIN — MIDAZOLAM HYDROCHLORIDE 2 MG: 1 INJECTION, SOLUTION INTRAMUSCULAR; INTRAVENOUS at 09:45

## 2024-01-18 RX ADMIN — MIDAZOLAM HYDROCHLORIDE 2 MG: 1 INJECTION, SOLUTION INTRAMUSCULAR; INTRAVENOUS at 09:48

## 2024-01-18 ASSESSMENT — FIBROSIS 4 INDEX: FIB4 SCORE: 0.92

## 2024-01-18 NOTE — DISCHARGE INSTRUCTIONS
HOME CARE INSTRUCTIONS    ACTIVITY: Rest and take it easy for the first 24 hours.  A responsible adult is recommended to remain with you during that time.  It is normal to feel sleepy.  We encourage you to not do anything that requires balance, judgment or coordination.    FOR 24 HOURS DO NOT:  Drive, operate machinery or run household appliances.  Drink beer or alcoholic beverages.  Make important decisions or sign legal documents.    SPECIAL INSTRUCTIONS: Electrical Cardioversion, Care After  This sheet gives you information about how to care for yourself after your procedure. Your health care provider may also give you more specific instructions. If you have problems or questions, contact your health care provider.  What can I expect after the procedure?  After the procedure, it is common to have:  Some redness on the skin where the shocks were given.  Follow these instructions at home:  Do not drive for 24 hours if you were given a medicine to help you relax (sedative).  Take over-the-counter and prescription medicines only as told by your health care provider.  Ask your health care provider how to check your pulse. Check it often.  Rest for 48 hours after the procedure or as told by your health care provider.  Avoid or limit your caffeine use as told by your health care provider.  Contact a health care provider if:  You feel like your heart is beating too quickly or your pulse is not regular.  You have a serious muscle cramp that does not go away.  Get help right away if:  You have discomfort in your chest.  You are dizzy or you feel faint.  You have trouble breathing or you are short of breath.  Your speech is slurred.  You have trouble moving an arm or leg on one side of your body.  Your fingers or toes turn cold or blue.    DIET: To avoid nausea, slowly advance diet as tolerated, avoiding spicy or greasy foods for the first day.  Add more substantial food to your diet according to your physician's  instructions. INCREASE FLUIDS AND FIBER TO AVOID CONSTIPATION.    SURGICAL DRESSING/BATHING: any redness on your chest, put aloe vera on.    MEDICATIONS: Resume taking daily medication.  Take prescribed pain medication with food.  If no medication is prescribed, you may take non-aspirin pain medication if needed.  PAIN MEDICATION CAN BE VERY CONSTIPATING.  Take a stool softener or laxative such as senokot, pericolace, or milk of magnesia if needed.    A follow-up appointment should be arranged with your doctor; call to schedule.    You should CALL YOUR PHYSICIAN if you develop:  Fever greater than 101 degrees F.  Pain not relieved by medication, or persistent nausea or vomiting.  Excessive bleeding (blood soaking through dressing) or unexpected drainage from the wound.  Extreme redness or swelling around the incision site, drainage of pus or foul smelling drainage.  Inability to urinate or empty your bladder within 8 hours.  Problems with breathing or chest pain.    You should call 911 if you develop problems with breathing or chest pain.  If you are unable to contact your doctor or surgical center, you should go to the nearest emergency room or urgent care center.  Physician's telephone #: [phone]    MILD FLU-LIKE SYMPTOMS ARE NORMAL.  YOU MAY EXPERIENCE GENERALIZED MUSCLE ACHES, THROAT IRRITATION, HEADACHE AND/OR SOME NAUSEA.    If any questions arise, call your doctor.  If your doctor is not available, please feel free to call the Surgical Center at [phone].  The Center is open Monday through Friday from 7AM to 7PM.      A registered nurse may call you a few days after your surgery to see how you are doing after your procedure.    You may also receive a survey in the mail within the next two weeks and we ask that you take a few moments to complete the survey and return it to us.  Our goal is to provide you with very good care and we value your comments.     Depression / Suicide Risk    As you are  discharged from this [facility] facility, it is important to learn how to keep safe from harming yourself.    Recognize the warning signs:  Abrupt changes in personality, positive or negative- including increase in energy   Giving away possessions  Change in eating patterns- significant weight changes-  positive or negative  Change in sleeping patterns- unable to sleep or sleeping all the time   Unwillingness or inability to communicate  Depression  Unusual sadness, discouragement and loneliness  Talk of wanting to die  Neglect of personal appearance   Rebelliousness- reckless behavior  Withdrawal from people/activities they love  Confusion- inability to concentrate     If you or a loved one observes any of these behaviors or has concerns about self-harm, here's what you can do:  Talk about it- your feelings and reasons for harming yourself  Remove any means that you might use to hurt yourself (examples: pills, rope, extension cords, firearm)  Get professional help from the community (Mental Health, Substance Abuse, psychological counseling)  Do not be alone:Call your Safe Contact- someone whom you trust who will be there for you.  Call your local CRISIS HOTLINE 692-6734 or 443-564-5873  Call your local Children's Mobile Crisis Response Team [place] (830) 456-3108 or www.[employer]  Call the toll free National Suicide Prevention Hotlines   National Suicide Prevention Lifeline 440-788-GIZP (8137)  National Hope Line Network 800-SUICIDE (994-8759)    I acknowledge receipt and understanding of these Home Care instructions.

## 2024-01-18 NOTE — PROCEDURES
Procedure Performed: DCCV    Procedure physician: Barak  Assistant: None    Pre-procedure diagnosis/Indication: Atrial fibrillation - persistent  Post-procedure diagnosis: Same    Conscious sedation was supervised by myself and administered by trained personnel using fentanyl and versed between 942 and 952. The patient tolerated sedation without complication.     Procedure description: After confirmation of therapeutic anticoagulation and obtaining informed consent, the patient was deeply sedated with propofol by my anesthesia colleague.  . I then delivered a synchronized 200 joule biphasic cardioversion pulse in the anterior-posterior vector which successfully restored sinus rhythm. The patient awoke from sedation without complication.     Specimens: None  EBL: None  Complications: None    Impression  1. Successful cardioversion with restoration of sinus rhythm from Atrial fibrillation - persistent

## 2024-01-18 NOTE — PROGRESS NOTES
Pt discharged home with son. Verbalized understanding of discharge instructions. Vital signs stable/ NAD noted. Transported via wheelchair to [facility] without incident.

## 2024-01-19 ENCOUNTER — PATIENT MESSAGE (OUTPATIENT)
Dept: CARDIOLOGY | Facility: MEDICAL CENTER | Age: 59
End: 2024-01-19
Payer: COMMERCIAL

## 2024-02-06 ENCOUNTER — APPOINTMENT (RX ONLY)
Dept: URBAN - METROPOLITAN AREA CLINIC 20 | Facility: CLINIC | Age: 59
Setting detail: DERMATOLOGY
End: 2024-02-06

## 2024-02-06 DIAGNOSIS — L82.1 OTHER SEBORRHEIC KERATOSIS: ICD-10-CM

## 2024-02-06 DIAGNOSIS — L81.4 OTHER MELANIN HYPERPIGMENTATION: ICD-10-CM

## 2024-02-06 DIAGNOSIS — D18.0 HEMANGIOMA: ICD-10-CM

## 2024-02-06 DIAGNOSIS — Z71.89 OTHER SPECIFIED COUNSELING: ICD-10-CM

## 2024-02-06 DIAGNOSIS — D22 MELANOCYTIC NEVI: ICD-10-CM

## 2024-02-06 PROBLEM — D22.62 MELANOCYTIC NEVI OF LEFT UPPER LIMB, INCLUDING SHOULDER: Status: ACTIVE | Noted: 2024-02-06

## 2024-02-06 PROBLEM — D22.39 MELANOCYTIC NEVI OF OTHER PARTS OF FACE: Status: ACTIVE | Noted: 2024-02-06

## 2024-02-06 PROBLEM — D18.01 HEMANGIOMA OF SKIN AND SUBCUTANEOUS TISSUE: Status: ACTIVE | Noted: 2024-02-06

## 2024-02-06 PROBLEM — D22.61 MELANOCYTIC NEVI OF RIGHT UPPER LIMB, INCLUDING SHOULDER: Status: ACTIVE | Noted: 2024-02-06

## 2024-02-06 PROBLEM — D22.72 MELANOCYTIC NEVI OF LEFT LOWER LIMB, INCLUDING HIP: Status: ACTIVE | Noted: 2024-02-06

## 2024-02-06 PROBLEM — D22.71 MELANOCYTIC NEVI OF RIGHT LOWER LIMB, INCLUDING HIP: Status: ACTIVE | Noted: 2024-02-06

## 2024-02-06 PROBLEM — D22.5 MELANOCYTIC NEVI OF TRUNK: Status: ACTIVE | Noted: 2024-02-06

## 2024-02-06 PROCEDURE — ? COUNSELING

## 2024-02-06 PROCEDURE — ? SUNSCREEN RECOMMENDATIONS

## 2024-02-06 PROCEDURE — 99213 OFFICE O/P EST LOW 20 MIN: CPT

## 2024-02-06 ASSESSMENT — LOCATION DETAILED DESCRIPTION DERM
LOCATION DETAILED: RIGHT SUPERIOR UPPER BACK
LOCATION DETAILED: LEFT LATERAL PROXIMAL PRETIBIAL REGION
LOCATION DETAILED: RIGHT INFERIOR MEDIAL UPPER BACK
LOCATION DETAILED: RIGHT PROXIMAL PRETIBIAL REGION
LOCATION DETAILED: RIGHT DISTAL POSTERIOR THIGH
LOCATION DETAILED: LEFT DISTAL POSTERIOR THIGH
LOCATION DETAILED: RIGHT INFERIOR MEDIAL MIDBACK
LOCATION DETAILED: RIGHT VENTRAL PROXIMAL FOREARM
LOCATION DETAILED: LEFT PROXIMAL POSTERIOR UPPER ARM
LOCATION DETAILED: INFERIOR THORACIC SPINE
LOCATION DETAILED: RIGHT INFERIOR FOREHEAD
LOCATION DETAILED: LEFT VENTRAL PROXIMAL FOREARM
LOCATION DETAILED: RIGHT DISTAL POSTERIOR UPPER ARM
LOCATION DETAILED: RIGHT INFERIOR CENTRAL MALAR CHEEK

## 2024-02-06 ASSESSMENT — LOCATION SIMPLE DESCRIPTION DERM
LOCATION SIMPLE: LEFT PRETIBIAL REGION
LOCATION SIMPLE: RIGHT POSTERIOR UPPER ARM
LOCATION SIMPLE: RIGHT PRETIBIAL REGION
LOCATION SIMPLE: RIGHT FOREHEAD
LOCATION SIMPLE: UPPER BACK
LOCATION SIMPLE: LEFT FOREARM
LOCATION SIMPLE: RIGHT LOWER BACK
LOCATION SIMPLE: LEFT POSTERIOR UPPER ARM
LOCATION SIMPLE: RIGHT FOREARM
LOCATION SIMPLE: RIGHT POSTERIOR THIGH
LOCATION SIMPLE: LEFT POSTERIOR THIGH
LOCATION SIMPLE: RIGHT UPPER BACK
LOCATION SIMPLE: RIGHT CHEEK

## 2024-02-06 ASSESSMENT — LOCATION ZONE DERM
LOCATION ZONE: TRUNK
LOCATION ZONE: FACE
LOCATION ZONE: LEG
LOCATION ZONE: ARM

## 2024-02-08 ENCOUNTER — OFFICE VISIT (OUTPATIENT)
Dept: CARDIOLOGY | Facility: MEDICAL CENTER | Age: 59
End: 2024-02-08
Attending: INTERNAL MEDICINE
Payer: COMMERCIAL

## 2024-02-08 VITALS
OXYGEN SATURATION: 98 % | HEART RATE: 55 BPM | RESPIRATION RATE: 16 BRPM | HEIGHT: 65 IN | DIASTOLIC BLOOD PRESSURE: 68 MMHG | SYSTOLIC BLOOD PRESSURE: 112 MMHG | WEIGHT: 222 LBS | BODY MASS INDEX: 36.99 KG/M2

## 2024-02-08 DIAGNOSIS — I48.0 PAROXYSMAL ATRIAL FIBRILLATION (HCC): ICD-10-CM

## 2024-02-08 PROCEDURE — 93005 ELECTROCARDIOGRAM TRACING: CPT | Performed by: INTERNAL MEDICINE

## 2024-02-08 PROCEDURE — 99212 OFFICE O/P EST SF 10 MIN: CPT | Performed by: INTERNAL MEDICINE

## 2024-02-08 PROCEDURE — 99215 OFFICE O/P EST HI 40 MIN: CPT | Mod: 25 | Performed by: INTERNAL MEDICINE

## 2024-02-08 PROCEDURE — 3078F DIAST BP <80 MM HG: CPT | Performed by: INTERNAL MEDICINE

## 2024-02-08 PROCEDURE — 99213 OFFICE O/P EST LOW 20 MIN: CPT | Performed by: INTERNAL MEDICINE

## 2024-02-08 PROCEDURE — 93010 ELECTROCARDIOGRAM REPORT: CPT | Performed by: INTERNAL MEDICINE

## 2024-02-08 PROCEDURE — 3074F SYST BP LT 130 MM HG: CPT | Performed by: INTERNAL MEDICINE

## 2024-02-08 RX ORDER — AMOXICILLIN AND CLAVULANATE POTASSIUM 875; 125 MG/1; MG/1
TABLET, FILM COATED ORAL
COMMUNITY
Start: 2024-01-03

## 2024-02-08 ASSESSMENT — FIBROSIS 4 INDEX: FIB4 SCORE: 0.86

## 2024-02-08 NOTE — PROGRESS NOTES
Arrhythmia Clinic Note (Established patient)    DOS: 2/8/2024    Chief complaint/Reason for consult: Afib    Interval History: 57 y/o F with pAF. Had persistent Afib starting in Dec. Sent for DCCV and started on flecainide. Doing well no recurrent afib thus far.    ROS (+ highlighted in bold):  Constitutional: Fevers/chills/fatigue/weightloss  HEENT: Blurry vision/eye pain/sore throat/hearing loss  Respiratory: Shortness of breath/cough  Cardiovascular: Chest pain/palpitations/edema/orthopnea/syncope  GI: Nausea/vomitting/diarrhea  MSK: Arthralgias/myagias/muscle weakness  Skin: Rash/sores  Neurological: Numbness/tremors/vertigo  Endocrine: Excessive thirst/polyuria/cold intolerance/heat intolerance  Psych: Depression/anxiety    Past Medical History:   Diagnosis Date    Arrhythmia     rapid heart rate     Asthma     has not used inhaler in a year    Atrial fibrillation (HCC)     Chest discomfort 11/18/2013    Chickenpox     Cough     Depression     High cholesterol     History of anemia     Hypertension     Obesity     Palpitations 11/18/2013    Psychiatric disorder     anxiety    Rhinitis     Snoring     Ventricular bigeminy 11/18/2013    Wears glasses     Weight loss        Past Surgical History:   Procedure Laterality Date    KNEE ARTHROSCOPY Right 06/03/2019    Procedure: ARTHROSCOPY, KNEE;  Surgeon: Jag Gomez M.D.;  Location: [facility];  Service: Orthopedics    MENISCECTOMY, KNEE, MEDIAL Right 06/03/2019    Procedure: MENISCECTOMY, KNEE, MEDIAL, LATERAL-  PARTIAL;  Surgeon: Jag Gomez M.D.;  Location: [facility];  Service: Orthopedics    CHONDROPLASTY Right 06/03/2019    Procedure: CHONDROPLASTY- patella;  Surgeon: Jag Gomez M.D.;  Location: [facility];  Service: Orthopedics    ARTHROSCOPY, KNEE      COLONOSCOPY         Social History     Socioeconomic History    Marital status:      Spouse name: Not on file    Number of children: Not on file     Years of education: Not on file    Highest education level: Not on file   Occupational History    Not on file   Tobacco Use    Smoking status: Never    Smokeless tobacco: Never   Vaping Use    Vaping Use: Never used   Substance and Sexual Activity    Alcohol use: Not Currently     Comment: rarely drink    Drug use: Never    Sexual activity: Not Currently     Partners: Male   Other Topics Concern    Not on file   Social History Narrative    Not on file     Social Determinants of Health     Financial Resource Strain: Not on file   Food Insecurity: Not on file   Transportation Needs: Not on file   Physical Activity: Not on file   Stress: Not on file   Social Connections: Not on file   Intimate Partner Violence: Not on file   Housing Stability: Not on file       Family History   Problem Relation Age of Onset    Diabetes Mother     Genitourinary () Problems Mother         renal failure, kidey transplant    Hypertension Mother     Heart Disease Mother     Kidney Disease Mother     Thyroid Sister         2 with removal due to Graves    Hypertension Brother     Cancer Brother 60        colon cancer     Colon Cancer Brother     Cancer Paternal Grandmother         Breast cancer    Cancer Paternal Grandfather         lung    Cancer Maternal Aunt     Hypertension Father     Cancer Maternal Grandmother         Skin Cancer    Cancer Maternal Aunt         Breast cancer    Sleep Apnea Sister        No Known Allergies    Current Outpatient Medications   Medication Sig Dispense Refill    famotidine (PEPCID) 20 MG Tab Take 20 mg by mouth every day.      amLODIPine (NORVASC) 2.5 MG Tab Take 1 Tablet by mouth every day. 90 Tablet 3    flecainide (TAMBOCOR) 50 MG tablet Take 1 Tablet by mouth 2 times a day. (Patient taking differently: Take 50 mg by mouth 2 times a day. Was told by MD to take 2 days before procedure) 60 Tablet 3    rivaroxaban (XARELTO) 20 MG Tab tablet Take 1 Tablet by mouth at bedtime. 90 Tablet 3    metoprolol SR  "(TOPROL XL) 25 MG TABLET SR 24 HR Take 1 Tablet by mouth every day. (Patient taking differently: Take 25 mg by mouth every evening.) 100 Tablet 3    omeprazole (PRILOSEC) 20 MG delayed-release capsule Take 20 mg by mouth every day.      albuterol 108 (90 Base) MCG/ACT Aero Soln inhalation aerosol INHALE 2 PUFFS BY MOUTH EVERY 6 HOURS AS NEEDED FOR SHORTNESS OF BREATH 8.5 Inhaler 1    fluticasone (FLONASE) 50 MCG/ACT nasal spray Spray 2 Sprays in nose every day. 1 Bottle 11    multivitamin (THERAGRAN) Tab Take 1 Tab by mouth every day.      COLLAGEN PO Take  by mouth every day. 1/2 scoop      Cholecalciferol (VITAMIN D) 1000 UNIT CAPS Take 1 Cap by mouth every day.      amoxicillin-clavulanate (AUGMENTIN) 875-125 MG Tab TAKE 1 TABLET BY MOUTH TWO TIMES A DAY FOR 5 DAYS (Patient not taking: Reported on 2/8/2024)       No current facility-administered medications for this visit.       Physical Exam:  Vitals:    02/08/24 0822   BP: 112/68   BP Location: Left arm   Patient Position: Sitting   BP Cuff Size: Adult   Pulse: (!) 55   Resp: 16   SpO2: 98%   Weight: 101 kg (222 lb)   Height: 1.651 m (5' 5\")     General appearance: NAD, conversant   Eyes: anicteric sclerae, moist conjunctivae; no lid-lag; PERRLA  HENT: Atraumatic; oropharynx clear with moist mucous membranes and no mucosal ulcerations; normal hard and soft palate  Neck: Trachea midline; FROM, supple, no thyromegaly or lymphadenopathy  Lungs: CTA, with normal respiratory effort and no intercostal retractions  CV: RRR, no MRGs, no JVD  Abdomen: Soft, non-tender; no masses or HSM  Extremities: No peripheral edema or extremity lymphadenopathy  Skin: Normal temperature, turgor and texture; no rash, ulcers or subcutaneous nodules  Psych: Appropriate affect, alert and oriented to person, place and time    Data:  Lipids:   Lab Results   Component Value Date/Time    CHOLSTRLTOT 172 08/18/2022 10:33 AM    TRIGLYCERIDE 127 08/18/2022 10:33 AM    HDL 54 08/18/2022 10:33 AM " "   LDL 93 08/18/2022 10:33 AM        BMP:  Lab Results   Component Value Date/Time    SODIUM 142 01/18/2024 0817    POTASSIUM 4.1 01/18/2024 0817    CHLORIDE 108 01/18/2024 0817    CO2 25 01/18/2024 0817    GLUCOSE 95 01/18/2024 0817    BUN 15 01/18/2024 0817    CREATININE 0.70 01/18/2024 0817    CALCIUM 9.5 01/18/2024 0817    ANION 9.0 01/18/2024 0817        TSH:   Lab Results   Component Value Date/Time    TSHULTRASEN 1.100 09/02/2023 1008        THYROXINE (T4):   No results found for: \"FREEDIR\"     CBC:   Lab Results   Component Value Date/Time    WBC 5.1 01/18/2024 08:17 AM    RBC 4.51 01/18/2024 08:17 AM    HEMOGLOBIN 13.7 01/18/2024 08:17 AM    HEMATOCRIT 39.9 01/18/2024 08:17 AM    MCV 88.5 01/18/2024 08:17 AM    MCH 30.4 01/18/2024 08:17 AM    MCHC 34.3 01/18/2024 08:17 AM    RDW 39.0 01/18/2024 08:17 AM    PLATELETCT 242 01/18/2024 08:17 AM    MPV 9.6 01/18/2024 08:17 AM    NEUTSPOLYS 80.80 (H) 09/02/2023 10:08 AM    LYMPHOCYTES 10.90 (L) 09/02/2023 10:08 AM    MONOCYTES 4.90 09/02/2023 10:08 AM    EOSINOPHILS 2.60 09/02/2023 10:08 AM    BASOPHILS 0.40 09/02/2023 10:08 AM    IMMGRAN 0.40 09/02/2023 10:08 AM    NRBC 0.00 09/02/2023 10:08 AM    NEUTS 6.13 09/02/2023 10:08 AM    LYMPHS 0.83 (L) 09/02/2023 10:08 AM    MONOS 0.37 09/02/2023 10:08 AM    EOS 0.20 09/02/2023 10:08 AM    BASO 0.03 09/02/2023 10:08 AM    IMMGRANAB 0.03 09/02/2023 10:08 AM    NRBCAB 0.00 09/02/2023 10:08 AM        CBC w/o DIFF  Lab Results   Component Value Date/Time    WBC 5.1 01/18/2024 08:17 AM    RBC 4.51 01/18/2024 08:17 AM    HEMOGLOBIN 13.7 01/18/2024 08:17 AM    MCV 88.5 01/18/2024 08:17 AM    MCH 30.4 01/18/2024 08:17 AM    MCHC 34.3 01/18/2024 08:17 AM    RDW 39.0 01/18/2024 08:17 AM    MPV 9.6 01/18/2024 08:17 AM       Prior echo/stress reviewed: Normal EF no ischemia    Prior cath reviewed: NA    EKG interpreted by me: LEONARD    Impression/Plan:  1. Paroxysmal atrial fibrillation (HCC)  EKG        pAF  High risk medication " use    - Continue flecainide for now  - Long term discussed ablation and she would tentatively be interested but has some travel coming up, will reach out in a couple months  - We discussed pulmonary vein isolation for therapeutic management and continued rhythm control.  We discussed the risks and benefits of this procedure.  Risks include 1-3% risk of major cardiovascular event including stroke, myocardial infarction, phrenic nerve damage, esophageal injury and/or fistula formation, cardiac perforation, pericardial effusion, tamponade, major bleeding, or death.  I quoted a 70 to 80% chance free of atrial fibrillation at 12 months.  We discussed that he may also need a second procedure.    FV pending above    A total of 48 minutes of time was spent on day of encounter reviewing medical record, performing history and examination, counseling, ordering medication/test/consults, collaborating with referring service, and documentation.      Juni Galvan MD  Cardiac Electrophysiology

## 2024-02-10 LAB — EKG IMPRESSION: NORMAL

## 2024-02-18 ENCOUNTER — PATIENT MESSAGE (OUTPATIENT)
Dept: CARDIOLOGY | Facility: MEDICAL CENTER | Age: 59
End: 2024-02-18
Payer: COMMERCIAL

## 2024-02-18 DIAGNOSIS — I48.19 PERSISTENT ATRIAL FIBRILLATION (HCC): ICD-10-CM

## 2024-02-20 RX ORDER — METOPROLOL SUCCINATE 25 MG/1
37.5 TABLET, EXTENDED RELEASE ORAL DAILY
Qty: 135 TABLET | Refills: 3 | Status: SHIPPED | OUTPATIENT
Start: 2024-02-20

## 2024-02-23 LAB — EKG IMPRESSION: NORMAL

## 2024-03-24 DIAGNOSIS — D68.69 HYPERCOAGULABLE STATE DUE TO PERSISTENT ATRIAL FIBRILLATION (HCC): ICD-10-CM

## 2024-03-24 DIAGNOSIS — I48.19 PERSISTENT ATRIAL FIBRILLATION WITH RVR (HCC): ICD-10-CM

## 2024-03-24 DIAGNOSIS — Z79.899 HIGH RISK MEDICATION USE: ICD-10-CM

## 2024-03-24 DIAGNOSIS — I48.19 PERSISTENT ATRIAL FIBRILLATION (HCC): ICD-10-CM

## 2024-03-24 DIAGNOSIS — I48.19 HYPERCOAGULABLE STATE DUE TO PERSISTENT ATRIAL FIBRILLATION (HCC): ICD-10-CM

## 2024-03-24 DIAGNOSIS — I10 HYPERTENSION, UNSPECIFIED TYPE: ICD-10-CM

## 2024-03-24 DIAGNOSIS — I10 ESSENTIAL HYPERTENSION: ICD-10-CM

## 2024-03-25 RX ORDER — FLECAINIDE ACETATE 50 MG/1
50 TABLET ORAL 2 TIMES DAILY
Qty: 180 TABLET | Refills: 1 | Status: SHIPPED | OUTPATIENT
Start: 2024-03-25

## 2024-06-20 ENCOUNTER — PATIENT MESSAGE (OUTPATIENT)
Dept: MEDICAL GROUP | Facility: LAB | Age: 59
End: 2024-06-20
Payer: COMMERCIAL

## 2024-08-05 ENCOUNTER — HOSPITAL ENCOUNTER (OUTPATIENT)
Dept: RADIOLOGY | Facility: MEDICAL CENTER | Age: 59
End: 2024-08-05
Attending: FAMILY MEDICINE
Payer: COMMERCIAL

## 2024-08-05 DIAGNOSIS — Z12.31 VISIT FOR SCREENING MAMMOGRAM: ICD-10-CM

## 2024-08-05 PROCEDURE — 77067 SCR MAMMO BI INCL CAD: CPT

## 2024-08-08 ENCOUNTER — OFFICE VISIT (OUTPATIENT)
Dept: MEDICAL GROUP | Facility: LAB | Age: 59
End: 2024-08-08
Payer: COMMERCIAL

## 2024-08-08 ENCOUNTER — HOSPITAL ENCOUNTER (OUTPATIENT)
Facility: MEDICAL CENTER | Age: 59
End: 2024-08-08
Attending: FAMILY MEDICINE
Payer: COMMERCIAL

## 2024-08-08 VITALS
SYSTOLIC BLOOD PRESSURE: 110 MMHG | BODY MASS INDEX: 38.15 KG/M2 | HEIGHT: 65 IN | WEIGHT: 229 LBS | DIASTOLIC BLOOD PRESSURE: 66 MMHG | TEMPERATURE: 97.2 F | OXYGEN SATURATION: 99 % | HEART RATE: 66 BPM | RESPIRATION RATE: 16 BRPM

## 2024-08-08 DIAGNOSIS — M81.0 POSTMENOPAUSAL BONE LOSS: ICD-10-CM

## 2024-08-08 DIAGNOSIS — Z01.419 WELL WOMAN EXAM WITH ROUTINE GYNECOLOGICAL EXAM: ICD-10-CM

## 2024-08-08 DIAGNOSIS — Z12.4 SCREENING FOR CERVICAL CANCER: ICD-10-CM

## 2024-08-08 DIAGNOSIS — E55.9 VITAMIN D DEFICIENCY: ICD-10-CM

## 2024-08-08 DIAGNOSIS — N95.2 POSTMENOPAUSAL ATROPHIC VAGINITIS: ICD-10-CM

## 2024-08-08 PROCEDURE — 87624 HPV HI-RISK TYP POOLED RSLT: CPT

## 2024-08-08 PROCEDURE — 3074F SYST BP LT 130 MM HG: CPT | Performed by: FAMILY MEDICINE

## 2024-08-08 PROCEDURE — 88175 CYTOPATH C/V AUTO FLUID REDO: CPT

## 2024-08-08 PROCEDURE — 99396 PREV VISIT EST AGE 40-64: CPT | Performed by: FAMILY MEDICINE

## 2024-08-08 PROCEDURE — 3078F DIAST BP <80 MM HG: CPT | Performed by: FAMILY MEDICINE

## 2024-08-08 RX ORDER — ACYCLOVIR 800 MG/1
800 TABLET ORAL 3 TIMES DAILY
COMMUNITY
Start: 2024-06-11

## 2024-08-08 RX ORDER — ESTRADIOL 0.1 MG/G
CREAM VAGINAL
Qty: 42.5 G | Refills: 1 | Status: SHIPPED | OUTPATIENT
Start: 2024-08-08

## 2024-08-08 ASSESSMENT — FIBROSIS 4 INDEX: FIB4 SCORE: 0.88

## 2024-08-08 ASSESSMENT — PATIENT HEALTH QUESTIONNAIRE - PHQ9: CLINICAL INTERPRETATION OF PHQ2 SCORE: 0

## 2024-08-08 NOTE — PROGRESS NOTES
Subjective:     Chief Complaint   Patient presents with    Annual Exam         HPI:   Vicky presents today for annual exam.     Diet: trying to do keto like food, weight loss, meal supplements. On this for 10 days so far.   Exercise: pilates, but hasn't gone in a long time. Walking. Has seen weight loss doctor in [place]. Meal replacements, keto, etc.   Sleep: ok.Does have sleep apnea. CPAP.   Mammo: August 2024.   LMP: 5 years ago. Never on HRT.   Pap: Last year saw [facility], normal pap, but positive HPV. Was told to repeat in a year. Newer partners, dating, sexually active.  Recently more pain with intercourse.  Some spotting and bleeding after as well.  DEXA: due  Colonoscopy: 2023.           Current Outpatient Medications Ordered in Epic   Medication Sig Dispense Refill    acyclovir (ZOVIRAX) 800 MG Tab Take 800 mg by mouth 3 times a day.      estradiol (ESTRACE) 0.1 MG/GM vaginal cream 1 g nightly for two weeks, then twice weekly there after 42.5 g 1    flecainide (TAMBOCOR) 50 MG tablet TAKE 1 TABLET BY MOUTH TWICE A  Tablet 1    metoprolol SR (TOPROL XL) 25 MG TABLET SR 24 HR Take 1.5 Tablets by mouth every day. 135 Tablet 3    famotidine (PEPCID) 20 MG Tab Take 20 mg by mouth every day.      amLODIPine (NORVASC) 2.5 MG Tab Take 1 Tablet by mouth every day. 90 Tablet 3    rivaroxaban (XARELTO) 20 MG Tab tablet Take 1 Tablet by mouth at bedtime. 90 Tablet 3    albuterol 108 (90 Base) MCG/ACT Aero Soln inhalation aerosol INHALE 2 PUFFS BY MOUTH EVERY 6 HOURS AS NEEDED FOR SHORTNESS OF BREATH 8.5 Inhaler 1    fluticasone (FLONASE) 50 MCG/ACT nasal spray Spray 2 Sprays in nose every day. 1 Bottle 11    multivitamin (THERAGRAN) Tab Take 1 Tab by mouth every day.      COLLAGEN PO Take  by mouth every day. 1/2 scoop       No current [facility]-ordered facility-administered medications on file.         ROS:  Gen: no fevers/chills, no changes in weight  Eyes: no changes in vision  ENT: no sore throat, no hearing  "loss, no bloody nose  Pulm: no sob, no cough  CV: no chest pain, no palpitations  GI: no nausea/vomiting, no diarrhea  : no dysuria  MSk: no myalgias  Skin: no rash  Neuro: no headaches, no numbness/tingling  Heme/Lymph: no easy bruising      Objective:     Exam:  /66   Pulse 66   Temp 36.2 °C (97.2 °F)   Resp 16   Ht 1.651 m (5' 5\")   Wt 104 kg (229 lb)   LMP 09/18/2013   SpO2 99%   BMI 38.11 kg/m²  Body mass index is 38.11 kg/m².    Gen: Alert and oriented, No apparent distress.  Neck: Neck is supple without lymphadenopathy.  Lungs: Normal effort, CTA bilaterally, no wheezes, rhonchi, or rales  CV: Regular rate and rhythm. No murmurs, rubs, or gallops.  Ext: No clubbing, cyanosis, edema.  : Normal externally.  No masses appreciated.  Physiologic discharge present.  There is some irritation and mild atrophy present in the vaginal vault.  Cervix appears pink and healthy.  Pap collected today.    Assessment & Plan:     59 y.o. female with the following -     1. Screening for cervical cancer  Discussed Pap schedule.  She does report a history of normal Pap but positive HPV.  This is untyped as far as we know.  Will go ahead and get this repeated today.  Will discuss next steps as needed based on the results.  - THINPREP PAP WITH HPV; Future    2. Postmenopausal atrophic vaginitis  Discussed postmenopausal atrophic vaginitis.  We discussed there is really is no reason why she cannot start using vaginal estrogen.  Discussed this is a local treatment and not systemic.  She may still be a very good candidate for systemic treatment with HRT going forward.  We did discuss risks and benefits possible side effects.  Would like to know a little bit more about her heart health in general since it has been about 5 years since her last.  - estradiol (ESTRACE) 0.1 MG/GM vaginal cream; 1 g nightly for two weeks, then twice weekly there after  Dispense: 42.5 g; Refill: 1    3. Well woman exam with routine " "gynecological exam  Discussed diet and exercise recommendations.  Discussed age-related anticipatory guidance.  Discussed the importance of getting enough fiber in the diet 25 to 30 g daily.  We discussed with activity and with her weight and even ideal body weight she probably should be getting close to 100 g of protein daily.  Discussed possibly playing around with time restricted feeding or \"fasting\".  Discussed try to stick with whole natural foods is much as possible.  Discussed limiting a lot of processed \"keto\" foods.  Will get some routine labs for further risk assessment.  - CBC WITH DIFFERENTIAL; Future  - Comp Metabolic Panel; Future  - HEMOGLOBIN A1C; Future  - Lipid Profile; Future  - TSH WITH REFLEX TO FT4; Future    4. Vitamin D deficiency    - VITAMIN D,25 HYDROXY (DEFICIENCY); Future    5. Postmenopausal bone loss    - DS-BONE DENSITY STUDY (DEXA); Future          Return in about 4 weeks (around 9/5/2024) for f/u labs.    Please note that this dictation was created using voice recognition software. I have made every reasonable attempt to correct obvious errors, but I expect that there are errors of grammar and possibly content that I did not discover before finalizing the note.        "

## 2024-08-09 ENCOUNTER — HOSPITAL ENCOUNTER (OUTPATIENT)
Dept: LAB | Facility: MEDICAL CENTER | Age: 59
End: 2024-08-09
Attending: FAMILY MEDICINE
Payer: COMMERCIAL

## 2024-08-09 DIAGNOSIS — Z01.419 WELL WOMAN EXAM WITH ROUTINE GYNECOLOGICAL EXAM: ICD-10-CM

## 2024-08-09 DIAGNOSIS — E55.9 VITAMIN D DEFICIENCY: ICD-10-CM

## 2024-08-09 LAB
25(OH)D3 SERPL-MCNC: 47 NG/ML (ref 30–100)
ALBUMIN SERPL BCP-MCNC: 4.5 G/DL (ref 3.2–4.9)
ALBUMIN/GLOB SERPL: 1.4 G/DL
ALP SERPL-CCNC: 75 U/L (ref 30–99)
ALT SERPL-CCNC: 27 U/L (ref 2–50)
ANION GAP SERPL CALC-SCNC: 15 MMOL/L (ref 7–16)
AST SERPL-CCNC: 22 U/L (ref 12–45)
BASOPHILS # BLD AUTO: 0.6 % (ref 0–1.8)
BASOPHILS # BLD: 0.03 K/UL (ref 0–0.12)
BILIRUB SERPL-MCNC: 0.5 MG/DL (ref 0.1–1.5)
BUN SERPL-MCNC: 15 MG/DL (ref 8–22)
CALCIUM ALBUM COR SERPL-MCNC: 9.2 MG/DL (ref 8.5–10.5)
CALCIUM SERPL-MCNC: 9.6 MG/DL (ref 8.4–10.2)
CHLORIDE SERPL-SCNC: 105 MMOL/L (ref 96–112)
CHOLEST SERPL-MCNC: 194 MG/DL (ref 100–199)
CO2 SERPL-SCNC: 22 MMOL/L (ref 20–33)
CREAT SERPL-MCNC: 0.74 MG/DL (ref 0.5–1.4)
EOSINOPHIL # BLD AUTO: 0.08 K/UL (ref 0–0.51)
EOSINOPHIL NFR BLD: 1.6 % (ref 0–6.9)
ERYTHROCYTE [DISTWIDTH] IN BLOOD BY AUTOMATED COUNT: 38.7 FL (ref 35.9–50)
EST. AVERAGE GLUCOSE BLD GHB EST-MCNC: 103 MG/DL
FASTING STATUS PATIENT QL REPORTED: NORMAL
GFR SERPLBLD CREATININE-BSD FMLA CKD-EPI: 94 ML/MIN/1.73 M 2
GLOBULIN SER CALC-MCNC: 3.2 G/DL (ref 1.9–3.5)
GLUCOSE SERPL-MCNC: 90 MG/DL (ref 65–99)
HBA1C MFR BLD: 5.2 % (ref 4–5.6)
HCT VFR BLD AUTO: 42.1 % (ref 37–47)
HDLC SERPL-MCNC: 52 MG/DL
HGB BLD-MCNC: 14 G/DL (ref 12–16)
IMM GRANULOCYTES # BLD AUTO: 0.01 K/UL (ref 0–0.11)
IMM GRANULOCYTES NFR BLD AUTO: 0.2 % (ref 0–0.9)
LDLC SERPL CALC-MCNC: 113 MG/DL
LYMPHOCYTES # BLD AUTO: 1.3 K/UL (ref 1–4.8)
LYMPHOCYTES NFR BLD: 26.3 % (ref 22–41)
MCH RBC QN AUTO: 30.4 PG (ref 27–33)
MCHC RBC AUTO-ENTMCNC: 33.3 G/DL (ref 32.2–35.5)
MCV RBC AUTO: 91.3 FL (ref 81.4–97.8)
MONOCYTES # BLD AUTO: 0.42 K/UL (ref 0–0.85)
MONOCYTES NFR BLD AUTO: 8.5 % (ref 0–13.4)
NEUTROPHILS # BLD AUTO: 3.1 K/UL (ref 1.82–7.42)
NEUTROPHILS NFR BLD: 62.8 % (ref 44–72)
NRBC # BLD AUTO: 0 K/UL
NRBC BLD-RTO: 0 /100 WBC (ref 0–0.2)
PLATELET # BLD AUTO: 258 K/UL (ref 164–446)
PMV BLD AUTO: 9.4 FL (ref 9–12.9)
POTASSIUM SERPL-SCNC: 4.2 MMOL/L (ref 3.6–5.5)
PROT SERPL-MCNC: 7.7 G/DL (ref 6–8.2)
RBC # BLD AUTO: 4.61 M/UL (ref 4.2–5.4)
SODIUM SERPL-SCNC: 142 MMOL/L (ref 135–145)
TRIGL SERPL-MCNC: 145 MG/DL (ref 0–149)
TSH SERPL DL<=0.005 MIU/L-ACNC: 2.16 UIU/ML (ref 0.38–5.33)
WBC # BLD AUTO: 4.9 K/UL (ref 4.8–10.8)

## 2024-08-09 PROCEDURE — 80053 COMPREHEN METABOLIC PANEL: CPT

## 2024-08-09 PROCEDURE — 85025 COMPLETE CBC W/AUTO DIFF WBC: CPT

## 2024-08-09 PROCEDURE — 84443 ASSAY THYROID STIM HORMONE: CPT

## 2024-08-09 PROCEDURE — 36415 COLL VENOUS BLD VENIPUNCTURE: CPT

## 2024-08-09 PROCEDURE — 83036 HEMOGLOBIN GLYCOSYLATED A1C: CPT

## 2024-08-09 PROCEDURE — 80061 LIPID PANEL: CPT

## 2024-08-09 PROCEDURE — 82306 VITAMIN D 25 HYDROXY: CPT

## 2024-08-11 LAB
CYTOLOGIST CVX/VAG CYTO: NORMAL
CYTOLOGY CVX/VAG DOC CYTO: NORMAL
CYTOLOGY CVX/VAG DOC THIN PREP: NORMAL
HPV I/H RISK 4 DNA CVX QL PROBE+SIG AMP: NEGATIVE
NOTE NL11727A: NORMAL
OTHER STN SPEC: NORMAL
STAT OF ADQ CVX/VAG CYTO-IMP: NORMAL

## 2024-09-17 DIAGNOSIS — I48.19 HYPERCOAGULABLE STATE DUE TO PERSISTENT ATRIAL FIBRILLATION (HCC): ICD-10-CM

## 2024-09-17 DIAGNOSIS — I10 ESSENTIAL HYPERTENSION: ICD-10-CM

## 2024-09-17 DIAGNOSIS — Z79.899 HIGH RISK MEDICATION USE: ICD-10-CM

## 2024-09-17 DIAGNOSIS — I48.19 PERSISTENT ATRIAL FIBRILLATION WITH RVR (HCC): ICD-10-CM

## 2024-09-17 DIAGNOSIS — I48.19 PERSISTENT ATRIAL FIBRILLATION (HCC): ICD-10-CM

## 2024-09-17 DIAGNOSIS — I10 HYPERTENSION, UNSPECIFIED TYPE: ICD-10-CM

## 2024-09-17 DIAGNOSIS — D68.69 HYPERCOAGULABLE STATE DUE TO PERSISTENT ATRIAL FIBRILLATION (HCC): ICD-10-CM

## 2024-09-17 RX ORDER — FLECAINIDE ACETATE 50 MG/1
50 TABLET ORAL 2 TIMES DAILY
Qty: 180 TABLET | Refills: 0 | Status: SHIPPED | OUTPATIENT
Start: 2024-09-17

## 2024-10-21 ENCOUNTER — PATIENT MESSAGE (OUTPATIENT)
Dept: MEDICAL GROUP | Facility: LAB | Age: 59
End: 2024-10-21
Payer: COMMERCIAL

## 2024-10-21 RX ORDER — FAMOTIDINE 20 MG/1
20 TABLET, FILM COATED ORAL DAILY
Qty: 90 TABLET | Refills: 1 | Status: SHIPPED | OUTPATIENT
Start: 2024-10-21 | End: 2025-01-19

## 2024-10-22 ENCOUNTER — OFFICE VISIT (OUTPATIENT)
Dept: CARDIOLOGY | Facility: MEDICAL CENTER | Age: 59
End: 2024-10-22
Attending: NURSE PRACTITIONER
Payer: COMMERCIAL

## 2024-10-22 VITALS
OXYGEN SATURATION: 94 % | HEIGHT: 65 IN | WEIGHT: 225 LBS | HEART RATE: 60 BPM | DIASTOLIC BLOOD PRESSURE: 76 MMHG | RESPIRATION RATE: 15 BRPM | SYSTOLIC BLOOD PRESSURE: 132 MMHG | BODY MASS INDEX: 37.49 KG/M2

## 2024-10-22 DIAGNOSIS — Z79.899 HIGH RISK MEDICATION USE: ICD-10-CM

## 2024-10-22 DIAGNOSIS — I48.0 PAROXYSMAL ATRIAL FIBRILLATION (HCC): ICD-10-CM

## 2024-10-22 DIAGNOSIS — I10 ESSENTIAL HYPERTENSION: ICD-10-CM

## 2024-10-22 DIAGNOSIS — D68.318 CIRCULATING ANTICOAGULANTS (HCC): ICD-10-CM

## 2024-10-22 DIAGNOSIS — G47.33 OSA (OBSTRUCTIVE SLEEP APNEA): ICD-10-CM

## 2024-10-22 LAB — EKG IMPRESSION: NORMAL

## 2024-10-22 PROCEDURE — 93005 ELECTROCARDIOGRAM TRACING: CPT | Performed by: NURSE PRACTITIONER

## 2024-10-22 PROCEDURE — 93010 ELECTROCARDIOGRAM REPORT: CPT | Performed by: STUDENT IN AN ORGANIZED HEALTH CARE EDUCATION/TRAINING PROGRAM

## 2024-10-22 PROCEDURE — 99213 OFFICE O/P EST LOW 20 MIN: CPT | Performed by: NURSE PRACTITIONER

## 2024-10-22 PROCEDURE — 99214 OFFICE O/P EST MOD 30 MIN: CPT | Performed by: NURSE PRACTITIONER

## 2024-10-22 PROCEDURE — 3078F DIAST BP <80 MM HG: CPT | Performed by: NURSE PRACTITIONER

## 2024-10-22 PROCEDURE — 3075F SYST BP GE 130 - 139MM HG: CPT | Performed by: NURSE PRACTITIONER

## 2024-10-22 ASSESSMENT — ENCOUNTER SYMPTOMS
ORTHOPNEA: 0
SPEECH CHANGE: 0
FOCAL WEAKNESS: 0
PND: 0
DIARRHEA: 0
SPUTUM PRODUCTION: 0
HEARTBURN: 0
TREMORS: 0
DIZZINESS: 0
PALPITATIONS: 0
SENSORY CHANGE: 0
FEVER: 0
WHEEZING: 0
HEMOPTYSIS: 0
WEIGHT LOSS: 0
CHILLS: 0
HEADACHES: 0
COUGH: 0
SHORTNESS OF BREATH: 0
NAUSEA: 0
TINGLING: 0
VOMITING: 0
LOSS OF CONSCIOUSNESS: 0

## 2024-10-22 ASSESSMENT — FIBROSIS 4 INDEX: FIB4 SCORE: 0.95

## 2024-11-06 ENCOUNTER — APPOINTMENT (OUTPATIENT)
Dept: RADIOLOGY | Facility: MEDICAL CENTER | Age: 59
End: 2024-11-06
Attending: NURSE PRACTITIONER
Payer: COMMERCIAL

## 2024-11-07 ENCOUNTER — APPOINTMENT (OUTPATIENT)
Dept: CARDIOLOGY | Facility: MEDICAL CENTER | Age: 59
End: 2024-11-07
Attending: NURSE PRACTITIONER
Payer: COMMERCIAL

## 2024-11-15 ENCOUNTER — APPOINTMENT (OUTPATIENT)
Dept: RADIOLOGY | Facility: MEDICAL CENTER | Age: 59
End: 2024-11-15
Attending: NURSE PRACTITIONER
Payer: COMMERCIAL

## 2024-11-20 ENCOUNTER — NON-PROVIDER VISIT (OUTPATIENT)
Dept: CARDIOLOGY | Facility: MEDICAL CENTER | Age: 59
End: 2024-11-20
Attending: NURSE PRACTITIONER
Payer: COMMERCIAL

## 2024-11-20 DIAGNOSIS — I48.0 PAROXYSMAL ATRIAL FIBRILLATION (HCC): ICD-10-CM

## 2024-11-20 DIAGNOSIS — Z79.899 HIGH RISK MEDICATION USE: ICD-10-CM

## 2024-11-20 PROCEDURE — 93246 EXT ECG>7D<15D RECORDING: CPT

## 2024-11-20 NOTE — PROGRESS NOTES
Patient enrolled in the 14 day o Holter monitoring program per Winnie Sexton APRN.  >Office hook-up, serial #   >Currently pending EOS.

## 2024-11-21 ENCOUNTER — TELEPHONE (OUTPATIENT)
Dept: CARDIOLOGY | Facility: MEDICAL CENTER | Age: 59
End: 2024-11-21
Payer: COMMERCIAL

## 2024-11-21 NOTE — TELEPHONE ENCOUNTER
Zio patch was placed yesterday. Advised to make a follow up appt after a month from now. Patient verbally understood.

## 2024-11-22 ENCOUNTER — APPOINTMENT (OUTPATIENT)
Dept: CARDIOLOGY | Facility: MEDICAL CENTER | Age: 59
End: 2024-11-22
Attending: INTERNAL MEDICINE
Payer: COMMERCIAL

## 2024-12-09 ENCOUNTER — TELEPHONE (OUTPATIENT)
Dept: CARDIOLOGY | Facility: MEDICAL CENTER | Age: 59
End: 2024-12-09
Payer: COMMERCIAL

## 2024-12-09 NOTE — TELEPHONE ENCOUNTER
RADHA EOS to 's nurse, Kathy, on 12/9/2024    Preliminary findings:    2 episodes of SVT  with an avg rate of 117 bpm    Sinus rhythm  with an avg rate of 68 bpm    Rare supraventricular ectopics    Isolated VE(s) were frequent (8.0%), couplets ad triplets were rare    Ventricular bigeminy and trigeminy were noted    6 patient events:  SR 67-85  Ventricular trigeminy  VE(s)

## 2024-12-19 ENCOUNTER — TELEPHONE (OUTPATIENT)
Dept: CARDIOLOGY | Facility: MEDICAL CENTER | Age: 59
End: 2024-12-19
Payer: COMMERCIAL

## 2024-12-20 NOTE — TELEPHONE ENCOUNTER
Christine did not show any AF.  We can consider stopping anticoagualtion, if she would like to.  If she decides to come off, I do not see an indication for Aspirin therapy at this time.  However, if she has AF notifications I would want her to resume anticoagulation.   Thank you,  Winnie   ----- Message from Nurse Kathie BEST R.N. sent at 12/10/2024  4:31 PM PST -----  EA - Please advise on results

## 2024-12-22 DIAGNOSIS — I48.19 HYPERCOAGULABLE STATE DUE TO PERSISTENT ATRIAL FIBRILLATION (HCC): ICD-10-CM

## 2024-12-22 DIAGNOSIS — I48.19 PERSISTENT ATRIAL FIBRILLATION WITH RVR (HCC): ICD-10-CM

## 2024-12-22 DIAGNOSIS — D68.69 HYPERCOAGULABLE STATE DUE TO PERSISTENT ATRIAL FIBRILLATION (HCC): ICD-10-CM

## 2024-12-22 DIAGNOSIS — I48.19 PERSISTENT ATRIAL FIBRILLATION (HCC): ICD-10-CM

## 2024-12-22 DIAGNOSIS — I10 ESSENTIAL HYPERTENSION: ICD-10-CM

## 2024-12-22 DIAGNOSIS — Z79.899 HIGH RISK MEDICATION USE: ICD-10-CM

## 2024-12-22 DIAGNOSIS — I10 HYPERTENSION, UNSPECIFIED TYPE: ICD-10-CM

## 2024-12-23 RX ORDER — METOPROLOL SUCCINATE 25 MG/1
37.5 TABLET, EXTENDED RELEASE ORAL DAILY
Qty: 135 TABLET | Refills: 3 | Status: SHIPPED | OUTPATIENT
Start: 2024-12-23

## 2024-12-23 RX ORDER — FLECAINIDE ACETATE 50 MG/1
50 TABLET ORAL 2 TIMES DAILY
Qty: 180 TABLET | Refills: 0 | Status: SHIPPED | OUTPATIENT
Start: 2024-12-23

## 2024-12-23 NOTE — TELEPHONE ENCOUNTER
EA- Please advise, would you like to continue to order this as BID? I saw in your last OV note that patient only taking daily. Thank you.

## 2024-12-23 NOTE — TELEPHONE ENCOUNTER
Is the patient due for a refill? Yes    Was the patient seen the past year? Yes    Date of last office visit: 10.22.2024    Does the patient have an upcoming appointment?  No       Provider to refill:EA    Does the patient have [facility] and need 100-day supply? (This applies to ALL medications) Patient does not have SCP

## 2025-01-02 ENCOUNTER — APPOINTMENT (OUTPATIENT)
Dept: MEDICAL GROUP | Facility: LAB | Age: 60
End: 2025-01-02
Payer: COMMERCIAL

## 2025-01-02 VITALS
HEIGHT: 65 IN | TEMPERATURE: 97.4 F | OXYGEN SATURATION: 95 % | DIASTOLIC BLOOD PRESSURE: 74 MMHG | WEIGHT: 238 LBS | RESPIRATION RATE: 16 BRPM | BODY MASS INDEX: 39.65 KG/M2 | HEART RATE: 60 BPM | SYSTOLIC BLOOD PRESSURE: 112 MMHG

## 2025-01-02 DIAGNOSIS — Z79.890 POSTMENOPAUSAL HRT (HORMONE REPLACEMENT THERAPY): ICD-10-CM

## 2025-01-02 PROCEDURE — 99214 OFFICE O/P EST MOD 30 MIN: CPT | Performed by: FAMILY MEDICINE

## 2025-01-02 PROCEDURE — 3074F SYST BP LT 130 MM HG: CPT | Performed by: FAMILY MEDICINE

## 2025-01-02 PROCEDURE — 3078F DIAST BP <80 MM HG: CPT | Performed by: FAMILY MEDICINE

## 2025-01-02 RX ORDER — PROGESTERONE 100 MG/1
100 CAPSULE ORAL DAILY
Qty: 90 CAPSULE | Refills: 1 | Status: SHIPPED | OUTPATIENT
Start: 2025-01-02

## 2025-01-02 RX ORDER — ESTRADIOL ACETATE 0.05 MG/D
1 RING VAGINAL
Qty: 1 EACH | Refills: 4 | Status: SHIPPED | OUTPATIENT
Start: 2025-01-02 | End: 2025-04-02

## 2025-01-02 ASSESSMENT — FIBROSIS 4 INDEX: FIB4 SCORE: 0.95

## 2025-01-02 NOTE — PROGRESS NOTES
Subjective:     Chief Complaint   Patient presents with    Medication Management     HRT     Hair Loss         HPI:   Vicky presents today for discussion HRT.     Lmp: about 5 years ago or so.   H/o paroxysmal atrial fib. Sees cardio. No known recent episodes. Stress testing and zio patch ordered by cardio.   Zio patch showed no atrial fib, low burden PVCs < 8%.     Reporting hair loss.   Started taking a collagen tablets from [employer].   Has some collagen to put in coffee too.     Trying to do little thing with diet. Not doing optifast.   Pilates 2-3 days per week. Trying to walk more. Trying to focus on protein, eggs, chomps for snacks. Lean protein. Grass fed beef. Avocado. Fish as well. [place], olive oil.   Avoiding sugar as much as possible.     Using vaginal estrogen when she's home. Not super consistent with this.             Current Outpatient Medications Ordered in Epic   Medication Sig Dispense Refill    metoprolol SR (TOPROL XL) 25 MG TABLET SR 24 HR TAKE 1 AND 1/2 TABLETS DAILY BY MOUTH 135 Tablet 3    flecainide (TAMBOCOR) 50 MG tablet TAKE 1 TABLET BY MOUTH TWICE A  Tablet 0    famotidine (PEPCID) 20 MG Tab Take 1 Tablet by mouth every day for 90 days. 90 Tablet 1    acyclovir (ZOVIRAX) 800 MG Tab Take 800 mg by mouth 3 times a day.      estradiol (ESTRACE) 0.1 MG/GM vaginal cream 1 g nightly for two weeks, then twice weekly there after 42.5 g 1    rivaroxaban (XARELTO) 20 MG Tab tablet Take 1 Tablet by mouth at bedtime. 90 Tablet 3    albuterol 108 (90 Base) MCG/ACT Aero Soln inhalation aerosol INHALE 2 PUFFS BY MOUTH EVERY 6 HOURS AS NEEDED FOR SHORTNESS OF BREATH 8.5 Inhaler 1    fluticasone (FLONASE) 50 MCG/ACT nasal spray Spray 2 Sprays in nose every day. 1 Bottle 11    multivitamin (THERAGRAN) Tab Take 1 Tab by mouth every day.      COLLAGEN PO Take  by mouth every day. 1/2 scoop      amLODIPine (NORVASC) 2.5 MG Tab Take 1 Tablet by mouth every day. (Patient not taking: Reported on 1/2/2025)  "90 Tablet 3     No current Epic-ordered facility-administered medications on file.         ROS:  Gen: no fevers/chills, no changes in weight  Eyes: no changes in vision  ENT: no sore throat, no hearing loss, no bloody nose  Pulm: no sob, no cough  CV: no chest pain, no palpitations  GI: no nausea/vomiting, no diarrhea  : no dysuria  MSk: no myalgias  Skin: no rash  Neuro: no headaches, no numbness/tingling  Heme/Lymph: no easy bruising      Objective:     Exam:  /74   Pulse 60   Temp 36.3 °C (97.4 °F)   Resp 16   Ht 1.651 m (5' 5\")   Wt 108 kg (238 lb)   LMP 09/18/2013   SpO2 95%   BMI 39.61 kg/m²  Body mass index is 39.61 kg/m².    Gen: AAOx3, NAD, well appearing  HEENT: NCAT, EOMI, Nares patent, Mucosa moist  Resp: Normal chest wall rise and fall, not SOB, no tachypnea  Skin: no rash or abnormality of visible skin.   Psych: normal speech, not slurred, good insight, affect full  MSK: Moves all four limbs equally and normally, gait normal      Assessment & Plan:     59 y.o. female with the following -     1. Postmenopausal HRT (hormone replacement therapy)  Overall discussed risks and benefits possible side effects of hormone replacement therapy.  We did discuss different options and she like to go ahead and try the Femring.  Because this is vaginal she will get some vaginal benefit but also she will get systemic benefit because of the dosing.  We did discuss still needing to use progesterone because she still has a uterus.  We discussed progesterone use at nighttime and then the Femring every 90 days.  We did discuss cost can be an issue with this.  She will let me know if this is feasible from a cost perspective overall.  We did discuss she can still use her Estrace cream vaginally as well.  Discussed low to no risk of overdose of estrogen systemically with these products.  She will let me know how things are going over the next month or so.  - progesterone (PROMETRIUM) 100 MG Cap; Take 1 Capsule " by mouth every day.  Dispense: 90 Capsule; Refill: 1  - Estradiol Acetate (FEMRING) 0.05 MG/24HR RING; Insert 1 Each into the vagina every 3 months for 90 days.  Dispense: 1 Each; Refill: 4            No follow-ups on file.    Please note that this dictation was created using voice recognition software. I have made every reasonable attempt to correct obvious errors, but I expect that there are errors of grammar and possibly content that I did not discover before finalizing the note.

## 2025-01-17 DIAGNOSIS — I48.0 PAROXYSMAL ATRIAL FIBRILLATION (HCC): ICD-10-CM

## 2025-01-17 RX ORDER — RIVAROXABAN 20 MG/1
20 TABLET, FILM COATED ORAL
Qty: 90 TABLET | Refills: 0 | Status: SHIPPED | OUTPATIENT
Start: 2025-01-17

## 2025-01-23 ENCOUNTER — TELEPHONE (OUTPATIENT)
Dept: CARDIOLOGY | Facility: MEDICAL CENTER | Age: 60
End: 2025-01-23
Payer: COMMERCIAL

## 2025-01-23 NOTE — TELEPHONE ENCOUNTER
Abdi Chavez,    Zio patch resulted a while back,  Message sent to pt which shows it  has not been read.  Can you please call her and let her know findings/recommendations as per previous chart message?  I got a anticoagulation refill for her.    Thank you,  Winnie

## 2025-01-23 NOTE — TELEPHONE ENCOUNTER
Called pt, left VM asking pt to call back regarding follow up from heart monitor results, see tele encounter 12/19/24.

## 2025-01-24 NOTE — TELEPHONE ENCOUNTER
EA - Please advise.   I s/w patient regarding stopping OAC. Pt recently reported her OB has recommended hormone therapy for estrogen and progesterone. Pt stated that she was reading there is an increase of blood clots with these medications.   Pt wondering if she would still continue OAC at this time due to these new medications?

## 2025-01-27 NOTE — TELEPHONE ENCOUNTER
I would defer to her PCP or GYN for this decision/recommendation as from an arrhythmia standpoint she could consider stopping, however, Im not sure what her prescriber of HRT would recommend from and HRT standpoint.  Can continue Xarelto  until she can discuss with PCP/GYN.

## 2025-01-27 NOTE — TELEPHONE ENCOUNTER
S/w patient regarding EA recommendations, patient verbalized understanding and will reach out to GYN for further discussion.

## 2025-02-11 ENCOUNTER — APPOINTMENT (OUTPATIENT)
Dept: URBAN - METROPOLITAN AREA CLINIC 20 | Facility: CLINIC | Age: 60
Setting detail: DERMATOLOGY
End: 2025-02-11

## 2025-02-11 DIAGNOSIS — D18.0 HEMANGIOMA: ICD-10-CM

## 2025-02-11 DIAGNOSIS — D22 MELANOCYTIC NEVI: ICD-10-CM

## 2025-02-11 DIAGNOSIS — L73.8 OTHER SPECIFIED FOLLICULAR DISORDERS: ICD-10-CM

## 2025-02-11 DIAGNOSIS — L82.1 OTHER SEBORRHEIC KERATOSIS: ICD-10-CM

## 2025-02-11 DIAGNOSIS — L65.9 NONSCARRING HAIR LOSS, UNSPECIFIED: ICD-10-CM

## 2025-02-11 DIAGNOSIS — L81.4 OTHER MELANIN HYPERPIGMENTATION: ICD-10-CM

## 2025-02-11 DIAGNOSIS — Z71.89 OTHER SPECIFIED COUNSELING: ICD-10-CM

## 2025-02-11 PROBLEM — D22.39 MELANOCYTIC NEVI OF OTHER PARTS OF FACE: Status: ACTIVE | Noted: 2025-02-11

## 2025-02-11 PROBLEM — D18.01 HEMANGIOMA OF SKIN AND SUBCUTANEOUS TISSUE: Status: ACTIVE | Noted: 2025-02-11

## 2025-02-11 PROBLEM — D22.72 MELANOCYTIC NEVI OF LEFT LOWER LIMB, INCLUDING HIP: Status: ACTIVE | Noted: 2025-02-11

## 2025-02-11 PROBLEM — D22.71 MELANOCYTIC NEVI OF RIGHT LOWER LIMB, INCLUDING HIP: Status: ACTIVE | Noted: 2025-02-11

## 2025-02-11 PROBLEM — D22.61 MELANOCYTIC NEVI OF RIGHT UPPER LIMB, INCLUDING SHOULDER: Status: ACTIVE | Noted: 2025-02-11

## 2025-02-11 PROBLEM — D22.62 MELANOCYTIC NEVI OF LEFT UPPER LIMB, INCLUDING SHOULDER: Status: ACTIVE | Noted: 2025-02-11

## 2025-02-11 PROBLEM — D22.5 MELANOCYTIC NEVI OF TRUNK: Status: ACTIVE | Noted: 2025-02-11

## 2025-02-11 PROCEDURE — ? ADDITIONAL NOTES

## 2025-02-11 PROCEDURE — 99213 OFFICE O/P EST LOW 20 MIN: CPT

## 2025-02-11 PROCEDURE — ? SUNSCREEN RECOMMENDATIONS

## 2025-02-11 PROCEDURE — ? COUNSELING

## 2025-02-11 ASSESSMENT — LOCATION DETAILED DESCRIPTION DERM
LOCATION DETAILED: RIGHT INFERIOR CENTRAL MALAR CHEEK
LOCATION DETAILED: RIGHT INFERIOR MEDIAL UPPER BACK
LOCATION DETAILED: RIGHT DISTAL POSTERIOR THIGH
LOCATION DETAILED: RIGHT INFERIOR MEDIAL MIDBACK
LOCATION DETAILED: INFERIOR THORACIC SPINE
LOCATION DETAILED: LEFT DISTAL POSTERIOR THIGH
LOCATION DETAILED: RIGHT PROXIMAL PRETIBIAL REGION
LOCATION DETAILED: RIGHT VENTRAL PROXIMAL FOREARM
LOCATION DETAILED: LEFT VENTRAL PROXIMAL FOREARM
LOCATION DETAILED: LEFT LATERAL PROXIMAL PRETIBIAL REGION
LOCATION DETAILED: RIGHT INFERIOR FOREHEAD
LOCATION DETAILED: RIGHT DISTAL POSTERIOR UPPER ARM
LOCATION DETAILED: RIGHT SUPERIOR UPPER BACK
LOCATION DETAILED: LEFT PROXIMAL POSTERIOR UPPER ARM

## 2025-02-11 ASSESSMENT — LOCATION ZONE DERM
LOCATION ZONE: FACE
LOCATION ZONE: LEG
LOCATION ZONE: TRUNK
LOCATION ZONE: ARM

## 2025-02-11 ASSESSMENT — LOCATION SIMPLE DESCRIPTION DERM
LOCATION SIMPLE: RIGHT POSTERIOR UPPER ARM
LOCATION SIMPLE: RIGHT CHEEK
LOCATION SIMPLE: RIGHT LOWER BACK
LOCATION SIMPLE: LEFT POSTERIOR THIGH
LOCATION SIMPLE: LEFT FOREARM
LOCATION SIMPLE: RIGHT UPPER BACK
LOCATION SIMPLE: RIGHT FOREARM
LOCATION SIMPLE: RIGHT FOREHEAD
LOCATION SIMPLE: RIGHT POSTERIOR THIGH
LOCATION SIMPLE: LEFT POSTERIOR UPPER ARM
LOCATION SIMPLE: RIGHT PRETIBIAL REGION
LOCATION SIMPLE: UPPER BACK
LOCATION SIMPLE: LEFT PRETIBIAL REGION

## 2025-02-11 NOTE — PROCEDURE: COUNSELING
Detail Level: Zone
Detail Level: Detailed
Patient Specific Counseling (Will Not Stick From Patient To Patient): Catherine's contact info given

## 2025-02-13 ENCOUNTER — RESULTS FOLLOW-UP (OUTPATIENT)
Dept: MEDICAL GROUP | Facility: LAB | Age: 60
End: 2025-02-13

## 2025-02-13 ENCOUNTER — HOSPITAL ENCOUNTER (OUTPATIENT)
Dept: RADIOLOGY | Facility: MEDICAL CENTER | Age: 60
End: 2025-02-13
Attending: FAMILY MEDICINE
Payer: COMMERCIAL

## 2025-02-13 DIAGNOSIS — M81.0 POSTMENOPAUSAL BONE LOSS: ICD-10-CM

## 2025-02-13 PROCEDURE — 77080 DXA BONE DENSITY AXIAL: CPT

## 2025-02-26 ENCOUNTER — PATIENT MESSAGE (OUTPATIENT)
Dept: MEDICAL GROUP | Facility: LAB | Age: 60
End: 2025-02-26
Payer: COMMERCIAL

## 2025-03-29 DIAGNOSIS — D68.69 HYPERCOAGULABLE STATE DUE TO PERSISTENT ATRIAL FIBRILLATION (HCC): ICD-10-CM

## 2025-03-29 DIAGNOSIS — I48.19 PERSISTENT ATRIAL FIBRILLATION WITH RVR (HCC): ICD-10-CM

## 2025-03-29 DIAGNOSIS — I48.19 PERSISTENT ATRIAL FIBRILLATION (HCC): ICD-10-CM

## 2025-03-29 DIAGNOSIS — I48.19 HYPERCOAGULABLE STATE DUE TO PERSISTENT ATRIAL FIBRILLATION (HCC): ICD-10-CM

## 2025-03-29 DIAGNOSIS — I10 HYPERTENSION, UNSPECIFIED TYPE: ICD-10-CM

## 2025-03-29 DIAGNOSIS — I10 ESSENTIAL HYPERTENSION: ICD-10-CM

## 2025-03-29 DIAGNOSIS — Z79.899 HIGH RISK MEDICATION USE: ICD-10-CM

## 2025-03-31 RX ORDER — FLECAINIDE ACETATE 50 MG/1
50 TABLET ORAL 2 TIMES DAILY
Qty: 180 TABLET | Refills: 0 | Status: SHIPPED | OUTPATIENT
Start: 2025-03-31

## 2025-04-03 ENCOUNTER — APPOINTMENT (OUTPATIENT)
Dept: URBAN - METROPOLITAN AREA CLINIC 20 | Facility: CLINIC | Age: 60
Setting detail: DERMATOLOGY
End: 2025-04-03

## 2025-04-03 DIAGNOSIS — L73.8 OTHER SPECIFIED FOLLICULAR DISORDERS: ICD-10-CM

## 2025-04-03 DIAGNOSIS — D18.0 HEMANGIOMA: ICD-10-CM

## 2025-04-03 DIAGNOSIS — L81.4 OTHER MELANIN HYPERPIGMENTATION: ICD-10-CM

## 2025-04-03 PROBLEM — D18.01 HEMANGIOMA OF SKIN AND SUBCUTANEOUS TISSUE: Status: ACTIVE | Noted: 2025-04-03

## 2025-04-03 PROCEDURE — ? LIQUID NITROGEN (COSMETIC)

## 2025-04-03 PROCEDURE — ? ELECTRODESICCATION (COSMETIC)

## 2025-04-03 ASSESSMENT — LOCATION DETAILED DESCRIPTION DERM
LOCATION DETAILED: LEFT CENTRAL MALAR CHEEK
LOCATION DETAILED: LEFT LATERAL EYEBROW
LOCATION DETAILED: LEFT SUPERIOR LATERAL BUCCAL CHEEK
LOCATION DETAILED: LEFT MEDIAL SUPERIOR TARSAL REGION
LOCATION DETAILED: LEFT INFERIOR LATERAL FOREHEAD
LOCATION DETAILED: LEFT SUPERIOR MEDIAL FOREHEAD
LOCATION DETAILED: RIGHT INFERIOR LATERAL MALAR CHEEK
LOCATION DETAILED: LEFT SUPERIOR LATERAL MALAR CHEEK
LOCATION DETAILED: RIGHT LATERAL MALAR CHEEK
LOCATION DETAILED: RIGHT INFERIOR TEMPLE
LOCATION DETAILED: LEFT INFERIOR FOREHEAD
LOCATION DETAILED: LEFT INFERIOR MEDIAL FOREHEAD

## 2025-04-03 ASSESSMENT — LOCATION SIMPLE DESCRIPTION DERM
LOCATION SIMPLE: LEFT EYEBROW
LOCATION SIMPLE: LEFT CHEEK
LOCATION SIMPLE: LEFT FOREHEAD
LOCATION SIMPLE: RIGHT TEMPLE
LOCATION SIMPLE: LEFT MEDIAL SUPERIOR TARSAL REGION
LOCATION SIMPLE: RIGHT CHEEK

## 2025-04-03 ASSESSMENT — LOCATION ZONE DERM
LOCATION ZONE: EYELID
LOCATION ZONE: FACE

## 2025-04-04 ENCOUNTER — APPOINTMENT (OUTPATIENT)
Dept: URBAN - METROPOLITAN AREA CLINIC 36 | Facility: CLINIC | Age: 60
Setting detail: DERMATOLOGY
End: 2025-04-04

## 2025-04-04 DIAGNOSIS — Z41.9 ENCOUNTER FOR PROCEDURE FOR PURPOSES OTHER THAN REMEDYING HEALTH STATE, UNSPECIFIED: ICD-10-CM

## 2025-04-04 PROCEDURE — ? COSMETIC CONSULTATION: FACIAL

## 2025-04-04 ASSESSMENT — LOCATION DETAILED DESCRIPTION DERM: LOCATION DETAILED: RIGHT INFERIOR MEDIAL FOREHEAD

## 2025-04-04 ASSESSMENT — LOCATION SIMPLE DESCRIPTION DERM: LOCATION SIMPLE: RIGHT FOREHEAD

## 2025-04-04 ASSESSMENT — LOCATION ZONE DERM: LOCATION ZONE: FACE

## 2025-04-17 RX ORDER — FAMOTIDINE 20 MG/1
20 TABLET, FILM COATED ORAL
Qty: 90 TABLET | Refills: 1 | Status: SHIPPED | OUTPATIENT
Start: 2025-04-17

## 2025-04-18 ENCOUNTER — APPOINTMENT (OUTPATIENT)
Dept: URBAN - METROPOLITAN AREA CLINIC 36 | Facility: CLINIC | Age: 60
Setting detail: DERMATOLOGY
End: 2025-04-18

## 2025-04-18 DIAGNOSIS — Z41.9 ENCOUNTER FOR PROCEDURE FOR PURPOSES OTHER THAN REMEDYING HEALTH STATE, UNSPECIFIED: ICD-10-CM

## 2025-04-18 PROCEDURE — ? DIAMONDGLOW

## 2025-04-18 PROCEDURE — ? DERMAPLANE

## 2025-04-18 ASSESSMENT — LOCATION DETAILED DESCRIPTION DERM
LOCATION DETAILED: LEFT SUPERIOR TEMPLE
LOCATION DETAILED: LEFT LATERAL EYEBROW
LOCATION DETAILED: RIGHT LATERAL BUCCAL CHEEK
LOCATION DETAILED: LEFT CENTRAL MALAR CHEEK
LOCATION DETAILED: RIGHT INFERIOR LATERAL FOREHEAD
LOCATION DETAILED: LEFT INFERIOR CENTRAL MALAR CHEEK
LOCATION DETAILED: LEFT CHIN
LOCATION DETAILED: RIGHT LATERAL MALAR CHEEK
LOCATION DETAILED: LEFT INFERIOR MEDIAL FOREHEAD
LOCATION DETAILED: LEFT CENTRAL BUCCAL CHEEK
LOCATION DETAILED: RIGHT SUPERIOR LATERAL NECK
LOCATION DETAILED: RIGHT SUPERIOR CENTRAL MALAR CHEEK
LOCATION DETAILED: LEFT SUPERIOR CENTRAL BUCCAL CHEEK
LOCATION DETAILED: INFERIOR MID FOREHEAD
LOCATION DETAILED: LEFT MID TEMPLE
LOCATION DETAILED: RIGHT INFERIOR CENTRAL MALAR CHEEK
LOCATION DETAILED: LEFT LATERAL MALAR CHEEK
LOCATION DETAILED: RIGHT CENTRAL BUCCAL CHEEK
LOCATION DETAILED: NASAL DORSUM
LOCATION DETAILED: RIGHT CHIN
LOCATION DETAILED: LEFT SUPERIOR LATERAL MALAR CHEEK
LOCATION DETAILED: RIGHT LATERAL EYEBROW
LOCATION DETAILED: RIGHT MID TEMPLE
LOCATION DETAILED: RIGHT CENTRAL MALAR CHEEK
LOCATION DETAILED: RIGHT UPPER CUTANEOUS LIP
LOCATION DETAILED: PHILTRUM

## 2025-04-18 ASSESSMENT — LOCATION SIMPLE DESCRIPTION DERM
LOCATION SIMPLE: RIGHT TEMPLE
LOCATION SIMPLE: RIGHT CHEEK
LOCATION SIMPLE: UPPER LIP
LOCATION SIMPLE: LEFT TEMPLE
LOCATION SIMPLE: RIGHT FOREHEAD
LOCATION SIMPLE: INFERIOR FOREHEAD
LOCATION SIMPLE: LEFT FOREHEAD
LOCATION SIMPLE: CHIN
LOCATION SIMPLE: LEFT CHEEK
LOCATION SIMPLE: RIGHT EYEBROW
LOCATION SIMPLE: RIGHT ANTERIOR NECK
LOCATION SIMPLE: RIGHT LIP
LOCATION SIMPLE: NOSE
LOCATION SIMPLE: LEFT EYEBROW

## 2025-04-18 ASSESSMENT — LOCATION ZONE DERM
LOCATION ZONE: LIP
LOCATION ZONE: FACE
LOCATION ZONE: NOSE
LOCATION ZONE: NECK

## 2025-05-02 ENCOUNTER — TELEPHONE (OUTPATIENT)
Dept: MEDICAL GROUP | Facility: LAB | Age: 60
End: 2025-05-02

## 2025-05-02 ENCOUNTER — OFFICE VISIT (OUTPATIENT)
Dept: MEDICAL GROUP | Facility: LAB | Age: 60
End: 2025-05-02
Payer: COMMERCIAL

## 2025-05-02 VITALS
SYSTOLIC BLOOD PRESSURE: 118 MMHG | HEIGHT: 65 IN | BODY MASS INDEX: 40.77 KG/M2 | TEMPERATURE: 98 F | OXYGEN SATURATION: 97 % | WEIGHT: 244.71 LBS | DIASTOLIC BLOOD PRESSURE: 74 MMHG | RESPIRATION RATE: 16 BRPM | HEART RATE: 64 BPM

## 2025-05-02 DIAGNOSIS — J01.00 ACUTE MAXILLARY SINUSITIS, RECURRENCE NOT SPECIFIED: ICD-10-CM

## 2025-05-02 DIAGNOSIS — J02.9 SORE THROAT: ICD-10-CM

## 2025-05-02 LAB — S PYO DNA SPEC NAA+PROBE: NOT DETECTED

## 2025-05-02 PROCEDURE — 3074F SYST BP LT 130 MM HG: CPT | Performed by: PHYSICIAN ASSISTANT

## 2025-05-02 PROCEDURE — 99213 OFFICE O/P EST LOW 20 MIN: CPT | Performed by: PHYSICIAN ASSISTANT

## 2025-05-02 PROCEDURE — 3078F DIAST BP <80 MM HG: CPT | Performed by: PHYSICIAN ASSISTANT

## 2025-05-02 PROCEDURE — 87651 STREP A DNA AMP PROBE: CPT | Performed by: PHYSICIAN ASSISTANT

## 2025-05-02 RX ORDER — AMOXICILLIN 500 MG/1
500 CAPSULE ORAL 2 TIMES DAILY
Qty: 20 CAPSULE | Refills: 0 | Status: CANCELLED | OUTPATIENT
Start: 2025-05-02

## 2025-05-02 ASSESSMENT — PATIENT HEALTH QUESTIONNAIRE - PHQ9: CLINICAL INTERPRETATION OF PHQ2 SCORE: 0

## 2025-05-02 ASSESSMENT — FIBROSIS 4 INDEX: FIB4 SCORE: 0.97

## 2025-05-02 NOTE — PROGRESS NOTES
Subjective  Vicky Nino is a 60 y.o. female who presents today with symptoms of a sore throat and sinus pain.     Onset: Last Monday, April 28, 2025  Symptoms improved on Wednesday and Thursday; however, they worsened by Friday and severe by yesterday.  This is what prompted her to come in today.  Symptoms: Sore throat, fatigue, facial pain, ear pain, patient reports she saw a white strip on her uvula.  Patient denies a history of strep throat.  Alleviating factors: Honey, green tea, lemon or tea, elderberry Gummies  Aggravating factors: Dehydration  Denied F/C/N/D       Family History   Problem Relation Age of Onset    Diabetes Mother     Genitourinary () Problems Mother         renal failure, kidey transplant    Hypertension Mother     Heart Disease Mother     Kidney Disease Mother     Thyroid Sister         2 with removal due to Graves    Hypertension Brother     Cancer Brother 60        colon cancer     Colon Cancer Brother     Cancer Paternal Grandmother         Breast cancer    Cancer Paternal Grandfather         lung    Cancer Maternal Aunt     Hypertension Father     Cancer Maternal Grandmother         Skin Cancer    Cancer Maternal Aunt         Breast cancer    Sleep Apnea Sister        Social History     Socioeconomic History    Marital status:      Spouse name: Not on file    Number of children: Not on file    Years of education: Not on file    Highest education level: Not on file   Occupational History    Not on file   Tobacco Use    Smoking status: Never    Smokeless tobacco: Never   Vaping Use    Vaping status: Never Used   Substance and Sexual Activity    Alcohol use: Not Currently     Comment: rarely drink    Drug use: Never    Sexual activity: Not Currently     Partners: Male   Other Topics Concern    Not on file   Social History Narrative    Not on file     Social Drivers of Health     Financial Resource Strain: Not on file   Food Insecurity: Not on file   Transportation Needs: Not  on file   Physical Activity: Not on file   Stress: Not on file   Social Connections: Not on file   Intimate Partner Violence: Not on file   Housing Stability: Not on file       Past Surgical History:   Procedure Laterality Date    KNEE ARTHROSCOPY Right 06/03/2019    Procedure: ARTHROSCOPY, KNEE;  Surgeon: Jag Gomez M.D.;  Location: [facility];  Service: Orthopedics    MENISCECTOMY, KNEE, MEDIAL Right 06/03/2019    Procedure: MENISCECTOMY, KNEE, MEDIAL, LATERAL-  PARTIAL;  Surgeon: Jag Gomez M.D.;  Location: SUR[facility];  Service: Orthopedics    CHONDROPLASTY Right 06/03/2019    Procedure: CHONDROPLASTY- patella;  Surgeon: Jag Gomez M.D.;  Location: [facility];  Service: Orthopedics    ARTHROSCOPY, KNEE      COLONOSCOPY         Past Medical History:   Diagnosis Date    Arrhythmia     rapid heart rate     Asthma     has not used inhaler in a year    Atrial fibrillation (HCC)     Chest discomfort 11/18/2013    Chickenpox     Cough     Depression     High cholesterol     History of anemia     Hypertension     Obesity     Palpitations 11/18/2013    Psychiatric disorder     anxiety    Rhinitis     Snoring     Ventricular bigeminy 11/18/2013    Wears glasses     Weight loss        Current Outpatient Medications   Medication Sig Dispense Refill    amoxicillin-clavulanate (AUGMENTIN) 875-125 MG Tab Take 1 Tablet by mouth 2 times a day. 14 Tablet 0    famotidine (PEPCID) 20 MG Tab TAKE 1 TABLET BY MOUTH EVERY DAY 90 Tablet 1    flecainide (TAMBOCOR) 50 MG tablet TAKE 1 TABLET BY MOUTH TWICE A  Tablet 0    XARELTO 20 MG Tab tablet TAKE 1 TABLET BY MOUTH EVERYDAY AT BEDTIME 90 Tablet 0    progesterone (PROMETRIUM) 100 MG Cap Take 1 Capsule by mouth every day. 90 Capsule 1    metoprolol SR (TOPROL XL) 25 MG TABLET SR 24 HR TAKE 1 AND 1/2 TABLETS DAILY BY MOUTH 135 Tablet 3    acyclovir (ZOVIRAX) 800 MG Tab Take 800 mg by mouth 3 times a day.      estradiol  "(ESTRACE) 0.1 MG/GM vaginal cream 1 g nightly for two weeks, then twice weekly there after 42.5 g 1    albuterol 108 (90 Base) MCG/ACT Aero Soln inhalation aerosol INHALE 2 PUFFS BY MOUTH EVERY 6 HOURS AS NEEDED FOR SHORTNESS OF BREATH 8.5 Inhaler 1    fluticasone (FLONASE) 50 MCG/ACT nasal spray Spray 2 Sprays in nose every day. 1 Bottle 11    multivitamin (THERAGRAN) Tab Take 1 Tab by mouth every day.      COLLAGEN PO Take  by mouth every day. 1/2 scoop       No current facility-administered medications for this visit.       No Known Allergies    Objective  /74 (BP Location: Left arm, Patient Position: Sitting, BP Cuff Size: Large adult)   Pulse 64   Temp 36.7 °C (98 °F) (Temporal)   Resp 16   Ht 1.651 m (5' 5\")   Wt 111 kg (244 lb 11.4 oz)   SpO2 97%   Physical Exam  Constitutional:       Appearance: Normal appearance.   HENT:      Head:      Comments: Mild TTP over the maxillary sinuses     Mouth/Throat:      Mouth: Mucous membranes are moist.      Pharynx: Oropharynx is clear. No oropharyngeal exudate or posterior oropharyngeal erythema.   Eyes:      Extraocular Movements: Extraocular movements intact.      Pupils: Pupils are equal, round, and reactive to light.   Cardiovascular:      Rate and Rhythm: Normal rate and regular rhythm.   Pulmonary:      Effort: Pulmonary effort is normal. No respiratory distress.      Breath sounds: Normal breath sounds. No wheezing or rhonchi.   Musculoskeletal:      Right lower leg: No edema.      Left lower leg: No edema.   Skin:     General: Skin is warm and dry.   Neurological:      Mental Status: She is alert.   Psychiatric:         Mood and Affect: Mood normal.         Behavior: Behavior normal.         Thought Content: Thought content normal.         Labs:   POCT GROUP A STREP, PCR  Order: [id number]   Status: Final result       Next appt: None       Dx: Sore throat    Test Result Released: Yes (not seen)    0 Result Notes      Component  Ref Range & Units " (raúl) 11:22 AM   POC Group A Strep, PCR Not Detected   [facility]             Specimen Collected: 05/02/25 11:22 AM Last Resulted: 05/02/25 11:47 AM         Assessment & Plan  Sore throat  Discussed OTC cough suppressants such as Cepacol cough drops, dextromethorphan, antihistamines, honey. Encouraged high water intake. May certainly utilize OTC decongestants as needed.     ER precautions prn fever >102.5, increased WOB, onset CP, or worsening overall symptoms.     Orders:    POCT GROUP A STREP, PCR    Acute maxillary sinusitis, recurrence not specified  Physical exam revealed mild TTP over the maxillary sinuses.  Her symptoms improved and then got much worse.  Due to her presentation of symptoms I recommend moving forward with Augmentin twice daily for 7 days.    Orders:    amoxicillin-clavulanate (AUGMENTIN) 875-125 MG Tab; Take 1 Tablet by mouth 2 times a day.          1. Sore throat  - POCT GROUP A STREP, PCR    2. Acute maxillary sinusitis, recurrence not specified  - amoxicillin-clavulanate (AUGMENTIN) 875-125 MG Tab; Take 1 Tablet by mouth 2 times a day.  Dispense: 14 Tablet; Refill: 0

## 2025-05-02 NOTE — ASSESSMENT & PLAN NOTE
Physical exam revealed mild TTP over the maxillary sinuses.  Her symptoms improved and then got much worse.  Due to her presentation of symptoms I recommend moving forward with Augmentin twice daily for 7 days.    Orders:    amoxicillin-clavulanate (AUGMENTIN) 875-125 MG Tab; Take 1 Tablet by mouth 2 times a day.

## 2025-05-02 NOTE — ASSESSMENT & PLAN NOTE
Discussed OTC cough suppressants such as Cepacol cough drops, dextromethorphan, antihistamines, honey. Encouraged high water intake. May certainly utilize OTC decongestants as needed.     ER precautions prn fever >102.5, increased WOB, onset CP, or worsening overall symptoms.     Orders:    POCT GROUP A STREP, PCR

## 2025-05-18 DIAGNOSIS — N95.2 POSTMENOPAUSAL ATROPHIC VAGINITIS: ICD-10-CM

## 2025-05-19 RX ORDER — ESTRADIOL 0.1 MG/G
CREAM VAGINAL
Qty: 42.5 G | Refills: 1 | Status: SHIPPED | OUTPATIENT
Start: 2025-05-19

## 2025-05-19 NOTE — PROGRESS NOTES
Subjective:     CC:   Chief Complaint   Patient presents with   • Establish Care       HPI:   Vicky Nino is a 53 y.o. female who presents for annual exam. She is feeling well and denies any complaints.    Patient has GYN provider: yes, Dr Grullon  Last pap: 2017, wnl  Last mammo: 2018, wnl  Last colonoscopy: 2018, wnl  Last bone density test: Age 66  Qualify for hep C screen: No  Last Tdap: 2014  Gardiasil: Aged out  Hx. STD's: Neg panel  Birth control: Post menopausal     Post menopausal   No significant bloating/fluid retention, pelvic pain, or dyspareunia. No vaginal discharge   No breast tenderness, mass, nipple discharge, changes in size or contour, or abnormal cyclic discomfort.  She do not perform regular self breast exams.  Regular exercise: no   Diet: balanced diet     She  has a past medical history of Arrhythmia; Chest discomfort (11/18/2013); Obesity; Palpitations (11/18/2013); Psychiatric disorder; Snoring; and Ventricular bigeminy (11/18/2013).  She  has no past surgical history on file.    Family History   Problem Relation Age of Onset   • Diabetes Mother    • Genitourinary () Mother         renal failure, kidey transplant   • Hypertension Mother    • Thyroid Sister         2 with removal due to Graves   • Hypertension Brother    • Cancer Brother 60        colon cancer    • Cancer Paternal Grandmother         colon   • Cancer Paternal Grandfather         lung   • Cancer Maternal Aunt    • Hypertension Father    • Cancer Maternal Grandmother         breast   • Heart Disease Neg Hx        Social History     Social History   • Marital status:      Spouse name: N/A   • Number of children: N/A   • Years of education: N/A     Occupational History   • Not on file.     Social History Main Topics   • Smoking status: Never Smoker   • Smokeless tobacco: Never Used   • Alcohol use 2.5 oz/week     5 Standard drinks or equivalent per week   • Drug use: No   • Sexual activity: Yes     Partners: Male      Other Topics Concern   • Not on file     Social History Narrative   • No narrative on file       Patient Active Problem List    Diagnosis Date Noted   • Morbid obesity with BMI of 40.0-44.9, adult ([facility]) 05/04/2018   • Dense breast tissue on mammogram 05/04/2018   • Depression 03/26/2018   • Abnormal CBC 10/20/2016   • HSV-1 infection 10/20/2016   • Elevated fasting glucose 10/06/2016   • Hyperlipidemia 10/06/2016   • HSV infection 10/06/2016   • History of cold sores 10/06/2016   • Family history of diabetes mellitus in mother 10/06/2016   • Family history of colon cancer 10/06/2016   • Asthma in adult 04/24/2015   • Environmental allergies 04/24/2015   • Essential hypertension 07/01/2014         Current Outpatient Prescriptions   Medication Sig Dispense Refill   • amLODIPine (NORVASC) 2.5 MG Tab TAKE 1 TABLET BY MOUTH EVERY DAY 30 Tab 1   • Cholecalciferol (VITAMIN D) 1000 UNIT CAPS Take 1 Cap by mouth every day.     • aspirin (ASA) 81 MG CHEW Take 81 mg by mouth every day.     • acyclovir (ZOVIRAX) 800 MG Tab TAKE 1 TABLET ORALLY 3 TIMES A DAY (Patient taking differently: as needed. TAKE 1 TABLET ORALLY 3 TIMES A DAY) 30 Tab 1     No current facility-administered medications for this visit.      No Known Allergies    Review of Systems   Constitutional: Negative for fever, chills and malaise/fatigue.   HENT: Negative for congestion.    Eyes: Negative for pain.   Respiratory: Negative for cough and shortness of breath.    Cardiovascular: Negative for leg swelling.   Gastrointestinal: Negative for nausea, vomiting, abdominal pain and diarrhea.   Genitourinary: Negative for dysuria and hematuria.   Skin: Negative for rash.   Neurological: Negative for dizziness, focal weakness and headaches.   Endo/Heme/Allergies: Does not bruise/bleed easily.   Psychiatric/Behavioral: Negative for depression.  The patient is not nervous/anxious.      Objective:     /84 (BP Location: Left arm, Patient Position: Sitting)    "Pulse 94   Temp 36.8 °C (98.2 °F)   Resp 12   Ht 1.626 m (5' 4\")   Wt 109.8 kg (242 lb)   LMP 09/18/2013   SpO2 94%   BMI 41.54 kg/m²   Body mass index is 41.54 kg/m².  Wt Readings from Last 4 Encounters:   01/07/19 109.8 kg (242 lb)   05/07/18 108.4 kg (239 lb)   05/04/18 107.5 kg (237 lb)   04/03/18 105.2 kg (232 lb)       Physical Exam:  Constitutional: Well-developed and well-nourished. Not diaphoretic. No distress.   Skin: Skin is warm and dry. No rash noted.  Head: Atraumatic without lesions.  Eyes: Conjunctivae and extraocular motions are normal. Pupils are equal, round, and reactive to light. No scleral icterus.   Ears:  External ears unremarkable. Tympanic membranes clear and intact.  Nose: Nares patent. Septum midline. Turbinates without erythema nor edema. No discharge.   Mouth/Throat: Dentition is good. Tongue normal. Oropharynx is clear and moist. Posterior pharynx without erythema or exudates.  Neck: Supple, trachea midline. Normal range of motion. No thyromegaly present. No lymphadenopathy--cervical or supraclavicular.  Cardiovascular: Regular rate and rhythm, S1 and S2 without murmur, rubs, or gallops.    Abdomen: Soft, non tender, and without distention. Active bowel sounds in all four quadrants. No rebound, guarding, masses or HSM.  Extremities: No cyanosis, clubbing, erythema, nor edema. Distal pulses intact and symmetric.   Musculoskeletal: All major joints AROM full in all directions without pain.  Neurological: Alert and oriented x 3.  Psychiatric:  Behavior, mood, and affect are appropriate.    Assessment and Plan:   1. Need for vaccination  Administered and prescribed today  - Influenza Vaccine Quad Injection >3Y (PF)  - Shingles Vaccine (SHINGRIX)    2. Breast cancer screening  Ordered today  - US-SCREENING WHOLE BREAST BILATERAL (3D SCREENING); Future    3. Preventative health care  Patient has a ASCVD risk of 1.2% and all other labs within normal limits.  Patient up-to-date on most " preventative health care.    4. Morbid obesity with BMI of 40.0-44.9, adult (HCC)  Patient's weight was discussed today, including healthy low-carb diet, 30-minutes of moderate exercise daily and avoiding sugars and high-fat foods.      5. Hypertension, unspecified type  This is a chronic stable issue for this patient.  She has a history of hypertension and is followed by cardiology.  She states today that her blood pressure has been within range and she does have a blood pressure cuff at home.  She has a history of palpitations however she has not experienced those in the use were attributed to PVCs by cardiology.  We discussed symptomatic highs and lows.  She is to continue the amlodipine.    6. Depression, unspecified depression type  This is a resolved issue for this patient.  She describes her depression in the past of situational depression and did even trial being on oral medications.  Her PHQ 2 was 0 today.  Her  passed away in July and she has started from scratch with her life and is very motivated and positive.  No suicidal ideation today.  We will continue to monitor success.    Labs per orders  Immunizations per orders  Patient counseled about skin care, diet, supplements, vitamins, safe sex and exercise.    Follow-up: 3 months  Please note that this dictation was created using voice recognition software. I have made every reasonable attempt to correct obvious errors, but I expect that there are errors of grammar and possibly content that I did not discover before finalizing the note.     Family assist for ADL at home prn/No skilled OT needs

## 2025-07-05 DIAGNOSIS — I10 ESSENTIAL HYPERTENSION: ICD-10-CM

## 2025-07-05 DIAGNOSIS — D68.69 HYPERCOAGULABLE STATE DUE TO PERSISTENT ATRIAL FIBRILLATION (HCC): ICD-10-CM

## 2025-07-05 DIAGNOSIS — Z79.899 HIGH RISK MEDICATION USE: ICD-10-CM

## 2025-07-05 DIAGNOSIS — I48.19 PERSISTENT ATRIAL FIBRILLATION (HCC): ICD-10-CM

## 2025-07-05 DIAGNOSIS — I48.19 PERSISTENT ATRIAL FIBRILLATION WITH RVR (HCC): ICD-10-CM

## 2025-07-05 DIAGNOSIS — I48.19 HYPERCOAGULABLE STATE DUE TO PERSISTENT ATRIAL FIBRILLATION (HCC): ICD-10-CM

## 2025-07-05 DIAGNOSIS — Z79.890 POSTMENOPAUSAL HRT (HORMONE REPLACEMENT THERAPY): ICD-10-CM

## 2025-07-05 DIAGNOSIS — I10 HYPERTENSION, UNSPECIFIED TYPE: ICD-10-CM

## 2025-07-07 RX ORDER — FLECAINIDE ACETATE 50 MG/1
50 TABLET ORAL 2 TIMES DAILY
Qty: 180 TABLET | Refills: 0 | Status: SHIPPED | OUTPATIENT
Start: 2025-07-07

## 2025-07-07 RX ORDER — PROGESTERONE 100 MG/1
100 CAPSULE ORAL DAILY
Qty: 90 CAPSULE | Refills: 1 | Status: SHIPPED | OUTPATIENT
Start: 2025-07-07

## 2025-08-28 ENCOUNTER — APPOINTMENT (OUTPATIENT)
Dept: RADIOLOGY | Facility: MEDICAL CENTER | Age: 60
End: 2025-08-28
Attending: FAMILY MEDICINE
Payer: COMMERCIAL

## 2025-08-28 ASSESSMENT — FIBROSIS 4 INDEX: FIB4 SCORE: 0.97

## (undated) DEVICE — TUBING PUMP WITH CONNECTOR REDEUCE (1EA)

## (undated) DEVICE — CANISTER SUCTION RIGID RED 1500CC (40EA/CA)

## (undated) DEVICE — NEEDLE SAFETY 18 GA X 1 1/2 IN (100EA/BX)

## (undated) DEVICE — TUBING, SPIROMETRY KIT

## (undated) DEVICE — GLOVE, LITE (PAIR)

## (undated) DEVICE — KIT ROOM DECONTAMINATION

## (undated) DEVICE — HUMID-VENT HEAT AND MOISTURE EXCHANGE- (50/BX)

## (undated) DEVICE — LACTATED RINGERS INJ 1000 ML - (14EA/CA 60CA/PF)

## (undated) DEVICE — DRAPE IOBAN II INCISE 23X17 - (10EA/BX 4BX/CA)

## (undated) DEVICE — CHLORAPREP 26 ML APPLICATOR - ORANGE TINT(25/CA)

## (undated) DEVICE — SUCTION INSTRUMENT YANKAUER BULBOUS TIP W/O VENT (50EA/CA)

## (undated) DEVICE — MASK ANESTHESIA ADULT  - (100/CA)

## (undated) DEVICE — SPONGE GAUZE STER 4X4 8-PL - (2/PK 50PK/BX 12BX/CS)

## (undated) DEVICE — SET EXTENSION WITH 2 PORTS (48EA/CA) ***PART #2C8610 IS A SUBSTITUTE*****

## (undated) DEVICE — PROTECTOR ULNA NERVE - (36PR/CA)

## (undated) DEVICE — CANISTER SUCTION 3000ML MECHANICAL FILTER AUTO SHUTOFF MEDI-VAC NONSTERILE LF DISP  (40EA/CA)

## (undated) DEVICE — SENSOR SPO2 NEO LNCS ADHESIVE (20/BX) SEE USER NOTES

## (undated) DEVICE — TUBING CLEARLINK DUO-VENT - C-FLO (48EA/CA)

## (undated) DEVICE — SODIUM CHL. IRRIGATION 0.9% 3000ML (4EA/CA 65CA/PF)

## (undated) DEVICE — PADDING CAST 6 IN STERILE - 6 X 4 YDS (24/CA)

## (undated) DEVICE — ELECTRODE 850 FOAM ADHESIVE - HYDROGEL RADIOTRNSPRNT (50/PK)

## (undated) DEVICE — PACK KNEE ARTHROSCOPY SM OR - (2EA/CA)

## (undated) DEVICE — DRESSING XEROFORM 1X8 - (50/BX 4BX/CA)

## (undated) DEVICE — HEAD HOLDER JUNIOR/ADULT

## (undated) DEVICE — KIT ANESTHESIA W/CIRCUIT & 3/LT BAG W/FILTER (20EA/CA)

## (undated) DEVICE — SUTURE 3-0 ETHILON FS-1 - (36/BX) 30 INCH

## (undated) DEVICE — LEGGINGS IN-VIEW CLEAR POLY - (20PR/CA)

## (undated) DEVICE — GLOVE BIOGEL SZ 8 SURGICAL PF LTX - (50PR/BX 4BX/CA)

## (undated) DEVICE — SHAVER4.0 AGGRESSIVE + FORMLA (5EA/BX)

## (undated) DEVICE — DRAPE U ORTHOPEDIC - (10/BX)

## (undated) DEVICE — DRAPE LARGE 3 QUARTER - (20/CA)

## (undated) DEVICE — NEPTUNE 4 PORT MANIFOLD - (20/PK)

## (undated) DEVICE — SYRINGE 30 ML LL (56/BX)

## (undated) DEVICE — SPONGE GAUZESTER 4 X 4 4PLY - (128PK/CA)

## (undated) DEVICE — GLOVE BIOGEL ECLIPSE PF LATEX SIZE 8.0  (50PR/BX)

## (undated) DEVICE — SUTURE GENERAL